# Patient Record
Sex: FEMALE | Race: BLACK OR AFRICAN AMERICAN | NOT HISPANIC OR LATINO | URBAN - METROPOLITAN AREA
[De-identification: names, ages, dates, MRNs, and addresses within clinical notes are randomized per-mention and may not be internally consistent; named-entity substitution may affect disease eponyms.]

---

## 2017-01-17 ENCOUNTER — HOSPITAL ENCOUNTER (OUTPATIENT)
Dept: FAMILY MEDICINE CLINIC | Facility: CLINIC | Age: 52
Setting detail: SPECIMEN
Discharge: HOME | End: 2017-01-17
Attending: FAMILY MEDICINE | Admitting: FAMILY MEDICINE

## 2017-01-17 ENCOUNTER — CONVERSION ENCOUNTER (OUTPATIENT)
Dept: FAMILY MEDICINE CLINIC | Facility: CLINIC | Age: 52
End: 2017-01-17

## 2017-01-17 LAB
ANION GAP SERPL CALC-SCNC: 11.4 MMOL/L (ref 10–20)
BUN SERPL-MCNC: 22 MG/DL (ref 8–20)
BUN/CREAT SERPL: 22 (ref 5.4–26.2)
CALCIUM SERPL-MCNC: 8.8 MG/DL (ref 8.9–10.3)
CHLORIDE SERPL-SCNC: 106 MMOL/L (ref 101–111)
CONV CO2: 26 MMOL/L (ref 22–32)
CREAT UR-MCNC: 1 MG/DL (ref 0.4–1)
GLUCOSE SERPL-MCNC: 135 MG/DL (ref 65–99)
POTASSIUM SERPL-SCNC: 4.4 MMOL/L (ref 3.6–5.1)
SODIUM SERPL-SCNC: 139 MMOL/L (ref 136–144)

## 2017-01-24 ENCOUNTER — HOSPITAL ENCOUNTER (OUTPATIENT)
Dept: MAMMOGRAPHY | Facility: HOSPITAL | Age: 52
Discharge: HOME | End: 2017-01-24
Attending: FAMILY MEDICINE | Admitting: FAMILY MEDICINE

## 2017-09-08 ENCOUNTER — CONVERSION ENCOUNTER (OUTPATIENT)
Dept: FAMILY MEDICINE CLINIC | Facility: CLINIC | Age: 52
End: 2017-09-08

## 2017-09-08 ENCOUNTER — HOSPITAL ENCOUNTER (OUTPATIENT)
Dept: FAMILY MEDICINE CLINIC | Facility: CLINIC | Age: 52
Setting detail: SPECIMEN
Discharge: HOME | End: 2017-09-08
Attending: FAMILY MEDICINE | Admitting: FAMILY MEDICINE

## 2018-04-10 ENCOUNTER — HOSPITAL ENCOUNTER (OUTPATIENT)
Dept: FAMILY MEDICINE CLINIC | Facility: CLINIC | Age: 53
Setting detail: SPECIMEN
Discharge: HOME | End: 2018-04-10
Attending: FAMILY MEDICINE | Admitting: FAMILY MEDICINE

## 2018-04-10 ENCOUNTER — CONVERSION ENCOUNTER (OUTPATIENT)
Dept: FAMILY MEDICINE CLINIC | Facility: CLINIC | Age: 53
End: 2018-04-10

## 2018-04-10 LAB
ALBUMIN SERPL-MCNC: 3.8 G/DL (ref 3.5–4.8)
ALBUMIN/GLOB SERPL: 1.2 {RATIO} (ref 1–1.7)
ALP SERPL-CCNC: 84 IU/L (ref 32–91)
ALT SERPL-CCNC: 19 IU/L (ref 14–54)
ANION GAP SERPL CALC-SCNC: 10.9 MMOL/L (ref 10–20)
AST SERPL-CCNC: 21 IU/L (ref 15–41)
BILIRUB SERPL-MCNC: 0.6 MG/DL (ref 0.3–1.2)
BUN SERPL-MCNC: 18 MG/DL (ref 8–20)
BUN/CREAT SERPL: 20 (ref 5.4–26.2)
CALCIUM SERPL-MCNC: 9 MG/DL (ref 8.9–10.3)
CHLORIDE SERPL-SCNC: 104 MMOL/L (ref 101–111)
CHOLEST SERPL-MCNC: 206 MG/DL
CHOLEST/HDLC SERPL: 2.8 {RATIO}
CONV CO2: 25 MMOL/L (ref 22–32)
CONV LDL CHOLESTEROL DIRECT: 110 MG/DL (ref 0–100)
CONV MICROALBUM.,U,RANDOM: 14 MG/L
CONV TOTAL PROTEIN: 7.1 G/DL (ref 6.1–7.9)
CREAT 24H UR-MCNC: 165.7 MG/DL
CREAT UR-MCNC: 0.9 MG/DL (ref 0.4–1)
GLOBULIN UR ELPH-MCNC: 3.3 G/DL (ref 2.5–3.8)
GLUCOSE SERPL-MCNC: 229 MG/DL (ref 65–99)
HDLC SERPL-MCNC: 74 MG/DL
LDLC/HDLC SERPL: 1.5 {RATIO}
LIPID INTERPRETATION: ABNORMAL
MICROALBUMIN/CREAT UR: 8.4 UG/MG
POTASSIUM SERPL-SCNC: 3.9 MMOL/L (ref 3.6–5.1)
SODIUM SERPL-SCNC: 136 MMOL/L (ref 136–144)
TRIGL SERPL-MCNC: 100 MG/DL
VLDLC SERPL CALC-MCNC: 22.8 MG/DL

## 2018-10-05 ENCOUNTER — CONVERSION ENCOUNTER (OUTPATIENT)
Dept: FAMILY MEDICINE CLINIC | Facility: CLINIC | Age: 53
End: 2018-10-05

## 2018-10-05 ENCOUNTER — HOSPITAL ENCOUNTER (OUTPATIENT)
Dept: FAMILY MEDICINE CLINIC | Facility: CLINIC | Age: 53
Setting detail: SPECIMEN
Discharge: HOME | End: 2018-10-05
Attending: FAMILY MEDICINE | Admitting: FAMILY MEDICINE

## 2018-10-05 LAB
25(OH)D3 SERPL-MCNC: 39 NG/ML (ref 30–100)
ALBUMIN SERPL-MCNC: 4.1 G/DL (ref 3.5–4.8)
ALBUMIN/GLOB SERPL: 1.3 {RATIO} (ref 1–1.7)
ALP SERPL-CCNC: 89 IU/L (ref 32–91)
ALT SERPL-CCNC: 19 IU/L (ref 14–54)
ANION GAP SERPL CALC-SCNC: 13.8 MMOL/L (ref 10–20)
AST SERPL-CCNC: 21 IU/L (ref 15–41)
BASOPHILS # BLD AUTO: 0.1 10*3/UL (ref 0–0.2)
BASOPHILS NFR BLD AUTO: 1 % (ref 0–2)
BILIRUB SERPL-MCNC: 0.8 MG/DL (ref 0.3–1.2)
BILIRUB UR QL STRIP: NEGATIVE MG/DL
BUN SERPL-MCNC: 23 MG/DL (ref 8–20)
BUN/CREAT SERPL: 25.6 (ref 5.4–26.2)
CALCIUM SERPL-MCNC: 9.3 MG/DL (ref 8.9–10.3)
CASTS URNS QL MICRO: ABNORMAL /[LPF]
CHLORIDE SERPL-SCNC: 101 MMOL/L (ref 101–111)
CHOLEST SERPL-MCNC: 186 MG/DL
CHOLEST/HDLC SERPL: 2.5 {RATIO}
COLOR UR: YELLOW
CONV BACTERIA IN URINE MICRO: NEGATIVE
CONV CLARITY OF URINE: CLEAR
CONV CO2: 24 MMOL/L (ref 22–32)
CONV HYALINE CASTS IN URINE MICRO: 0 /[LPF] (ref 0–5)
CONV LDL CHOLESTEROL DIRECT: 97 MG/DL (ref 0–100)
CONV MICROALBUM.,U,RANDOM: 17 MG/L
CONV PROTEIN IN URINE BY AUTOMATED TEST STRIP: NEGATIVE MG/DL
CONV SMALL ROUND CELLS: ABNORMAL /[HPF]
CONV TOTAL PROTEIN: 7.2 G/DL (ref 6.1–7.9)
CONV UROBILINOGEN IN URINE BY AUTOMATED TEST STRIP: 0.2 MG/DL
CREAT 24H UR-MCNC: 93.9 MG/DL
CREAT UR-MCNC: 0.9 MG/DL (ref 0.4–1)
CULTURE INDICATED?: ABNORMAL
DIFFERENTIAL METHOD BLD: (no result)
EOSINOPHIL # BLD AUTO: 0.3 10*3/UL (ref 0–0.3)
EOSINOPHIL # BLD AUTO: 4 % (ref 0–3)
ERYTHROCYTE [DISTWIDTH] IN BLOOD BY AUTOMATED COUNT: 13.7 % (ref 11.5–14.5)
GLOBULIN UR ELPH-MCNC: 3.1 G/DL (ref 2.5–3.8)
GLUCOSE SERPL-MCNC: 346 MG/DL (ref 65–99)
GLUCOSE UR QL: >1000 MG/DL
HBA1C MFR BLD: 12.6 % (ref 0–5.6)
HCT VFR BLD AUTO: 42 % (ref 35–49)
HDLC SERPL-MCNC: 75 MG/DL
HGB BLD-MCNC: 14.1 G/DL (ref 12–15)
HGB UR QL STRIP: ABNORMAL
KETONES UR QL STRIP: NEGATIVE MG/DL
LDLC/HDLC SERPL: 1.3 {RATIO}
LEUKOCYTE ESTERASE UR QL STRIP: NEGATIVE
LIPID INTERPRETATION: NORMAL
LYMPHOCYTES # BLD AUTO: 1.6 10*3/UL (ref 0.8–4.8)
LYMPHOCYTES NFR BLD AUTO: 25 % (ref 18–42)
MCH RBC QN AUTO: 30.7 PG (ref 26–32)
MCHC RBC AUTO-ENTMCNC: 33.7 G/DL (ref 32–36)
MCV RBC AUTO: 91.2 FL (ref 80–94)
MICROALBUMIN/CREAT UR: 18.1 UG/MG
MONOCYTES # BLD AUTO: 0.3 10*3/UL (ref 0.1–1.3)
MONOCYTES NFR BLD AUTO: 5 % (ref 2–11)
NEUTROPHILS # BLD AUTO: 4.1 10*3/UL (ref 2.3–8.6)
NEUTROPHILS NFR BLD AUTO: 65 % (ref 50–75)
NITRITE UR QL STRIP: NEGATIVE
NRBC BLD AUTO-RTO: 0 /100{WBCS}
NRBC/RBC NFR BLD MANUAL: 0 10*3/UL
PH UR STRIP.AUTO: 6 [PH] (ref 4.5–8)
PLATELET # BLD AUTO: 241 10*3/UL (ref 150–450)
PMV BLD AUTO: 10.8 FL (ref 7.4–10.4)
POTASSIUM SERPL-SCNC: 3.8 MMOL/L (ref 3.6–5.1)
RBC # BLD AUTO: 4.6 10*6/UL (ref 4–5.4)
RBC #/AREA URNS HPF: 4 /[HPF] (ref 0–3)
SODIUM SERPL-SCNC: 135 MMOL/L (ref 136–144)
SP GR UR: 1.03 (ref 1–1.03)
SPERM URNS QL MICRO: ABNORMAL /[HPF]
SQUAMOUS SPT QL MICRO: 1 /[HPF] (ref 0–5)
TRIGL SERPL-MCNC: 69 MG/DL
TSH SERPL-ACNC: 1.78 UIU/ML (ref 0.34–5.6)
UNIDENT CRYS URNS QL MICRO: ABNORMAL /[HPF]
VLDLC SERPL CALC-MCNC: 14.7 MG/DL
WBC # BLD AUTO: 6.4 10*3/UL (ref 4.5–11.5)
WBC #/AREA URNS HPF: 2 /[HPF] (ref 0–5)
YEAST SPEC QL WET PREP: ABNORMAL /[HPF]

## 2018-10-11 LAB
HPV E6+E7 MRNA CVX QL NAA+PROBE: NOT DETECTED
THIN PREP CVX: NORMAL

## 2019-06-04 VITALS
RESPIRATION RATE: 16 BRPM | SYSTOLIC BLOOD PRESSURE: 150 MMHG | DIASTOLIC BLOOD PRESSURE: 87 MMHG | OXYGEN SATURATION: 99 % | OXYGEN SATURATION: 97 % | WEIGHT: 220 LBS | WEIGHT: 214 LBS | HEART RATE: 70 BPM | DIASTOLIC BLOOD PRESSURE: 90 MMHG | DIASTOLIC BLOOD PRESSURE: 92 MMHG | OXYGEN SATURATION: 98 % | WEIGHT: 225 LBS | HEART RATE: 93 BPM | SYSTOLIC BLOOD PRESSURE: 137 MMHG | SYSTOLIC BLOOD PRESSURE: 197 MMHG | HEART RATE: 75 BPM | WEIGHT: 229 LBS | OXYGEN SATURATION: 99 % | RESPIRATION RATE: 16 BRPM | RESPIRATION RATE: 16 BRPM | HEART RATE: 67 BPM | RESPIRATION RATE: 16 BRPM | DIASTOLIC BLOOD PRESSURE: 97 MMHG | SYSTOLIC BLOOD PRESSURE: 115 MMHG

## 2023-10-24 ENCOUNTER — HOSPITAL ENCOUNTER (INPATIENT)
Facility: HOSPITAL | Age: 58
LOS: 4 days | Discharge: NURSING, REHAB OR LONG-TERM CARE | DRG: 481 | End: 2023-10-28
Attending: EMERGENCY MEDICINE | Admitting: STUDENT IN AN ORGANIZED HEALTH CARE EDUCATION/TRAINING PROGRAM
Payer: COMMERCIAL

## 2023-10-24 ENCOUNTER — APPOINTMENT (OUTPATIENT)
Dept: GENERAL RADIOLOGY | Facility: HOSPITAL | Age: 58
DRG: 481 | End: 2023-10-24
Payer: COMMERCIAL

## 2023-10-24 ENCOUNTER — APPOINTMENT (OUTPATIENT)
Dept: CT IMAGING | Facility: HOSPITAL | Age: 58
DRG: 481 | End: 2023-10-24
Payer: COMMERCIAL

## 2023-10-24 DIAGNOSIS — S72.352A CLOSED DISPLACED COMMINUTED FRACTURE OF SHAFT OF LEFT FEMUR, INITIAL ENCOUNTER: Primary | ICD-10-CM

## 2023-10-24 PROBLEM — E66.9 OBESITY (BMI 30-39.9): Status: ACTIVE | Noted: 2023-10-24

## 2023-10-24 PROBLEM — E78.5 HLD (HYPERLIPIDEMIA): Status: ACTIVE | Noted: 2023-10-24

## 2023-10-24 PROBLEM — I10 HTN (HYPERTENSION): Status: ACTIVE | Noted: 2023-10-24

## 2023-10-24 PROBLEM — E11.9 TYPE 2 DIABETES MELLITUS, WITHOUT LONG-TERM CURRENT USE OF INSULIN: Status: ACTIVE | Noted: 2023-10-24

## 2023-10-24 LAB
ALBUMIN SERPL-MCNC: 4.1 G/DL (ref 3.5–5.2)
ALBUMIN/GLOB SERPL: 1.4 G/DL
ALP SERPL-CCNC: 97 U/L (ref 39–117)
ALT SERPL W P-5'-P-CCNC: 17 U/L (ref 1–33)
ANION GAP SERPL CALCULATED.3IONS-SCNC: 10 MMOL/L (ref 5–15)
AST SERPL-CCNC: 21 U/L (ref 1–32)
BASOPHILS # BLD AUTO: 0.03 10*3/MM3 (ref 0–0.2)
BASOPHILS NFR BLD AUTO: 0.4 % (ref 0–1.5)
BILIRUB SERPL-MCNC: 0.5 MG/DL (ref 0–1.2)
BUN SERPL-MCNC: 24 MG/DL (ref 6–20)
BUN/CREAT SERPL: 24 (ref 7–25)
CALCIUM SPEC-SCNC: 9.6 MG/DL (ref 8.6–10.5)
CHLORIDE SERPL-SCNC: 105 MMOL/L (ref 98–107)
CO2 SERPL-SCNC: 24 MMOL/L (ref 22–29)
CREAT SERPL-MCNC: 1 MG/DL (ref 0.57–1)
DEPRECATED RDW RBC AUTO: 45.5 FL (ref 37–54)
EGFRCR SERPLBLD CKD-EPI 2021: 65.4 ML/MIN/1.73
EOSINOPHIL # BLD AUTO: 0.3 10*3/MM3 (ref 0–0.4)
EOSINOPHIL NFR BLD AUTO: 4.3 % (ref 0.3–6.2)
ERYTHROCYTE [DISTWIDTH] IN BLOOD BY AUTOMATED COUNT: 13.1 % (ref 12.3–15.4)
GLOBULIN UR ELPH-MCNC: 2.9 GM/DL
GLUCOSE BLDC GLUCOMTR-MCNC: 146 MG/DL (ref 70–130)
GLUCOSE BLDC GLUCOMTR-MCNC: 173 MG/DL (ref 70–130)
GLUCOSE BLDC GLUCOMTR-MCNC: 196 MG/DL (ref 70–130)
GLUCOSE BLDC GLUCOMTR-MCNC: 202 MG/DL (ref 70–130)
GLUCOSE SERPL-MCNC: 208 MG/DL (ref 65–99)
HBA1C MFR BLD: 9 % (ref 4.8–5.6)
HCT VFR BLD AUTO: 41 % (ref 34–46.6)
HGB BLD-MCNC: 13.4 G/DL (ref 12–15.9)
IMM GRANULOCYTES # BLD AUTO: 0.03 10*3/MM3 (ref 0–0.05)
IMM GRANULOCYTES NFR BLD AUTO: 0.4 % (ref 0–0.5)
INR PPP: 1 (ref 0.9–1.1)
LYMPHOCYTES # BLD AUTO: 1.49 10*3/MM3 (ref 0.7–3.1)
LYMPHOCYTES NFR BLD AUTO: 21.4 % (ref 19.6–45.3)
MCH RBC QN AUTO: 30.6 PG (ref 26.6–33)
MCHC RBC AUTO-ENTMCNC: 32.7 G/DL (ref 31.5–35.7)
MCV RBC AUTO: 93.6 FL (ref 79–97)
MONOCYTES # BLD AUTO: 0.41 10*3/MM3 (ref 0.1–0.9)
MONOCYTES NFR BLD AUTO: 5.9 % (ref 5–12)
NEUTROPHILS NFR BLD AUTO: 4.71 10*3/MM3 (ref 1.7–7)
NEUTROPHILS NFR BLD AUTO: 67.6 % (ref 42.7–76)
NRBC BLD AUTO-RTO: 0 /100 WBC (ref 0–0.2)
PLATELET # BLD AUTO: 232 10*3/MM3 (ref 140–450)
PMV BLD AUTO: 11.5 FL (ref 6–12)
POTASSIUM SERPL-SCNC: 4.4 MMOL/L (ref 3.5–5.2)
PROT SERPL-MCNC: 7 G/DL (ref 6–8.5)
PROTHROMBIN TIME: 13.3 SECONDS (ref 11.7–14.2)
QT INTERVAL: 392 MS
QTC INTERVAL: 441 MS
RBC # BLD AUTO: 4.38 10*6/MM3 (ref 3.77–5.28)
SODIUM SERPL-SCNC: 139 MMOL/L (ref 136–145)
WBC NRBC COR # BLD: 6.97 10*3/MM3 (ref 3.4–10.8)

## 2023-10-24 PROCEDURE — 25010000002 MORPHINE PER 10 MG: Performed by: EMERGENCY MEDICINE

## 2023-10-24 PROCEDURE — 80053 COMPREHEN METABOLIC PANEL: CPT | Performed by: EMERGENCY MEDICINE

## 2023-10-24 PROCEDURE — 85025 COMPLETE CBC W/AUTO DIFF WBC: CPT | Performed by: EMERGENCY MEDICINE

## 2023-10-24 PROCEDURE — 99285 EMERGENCY DEPT VISIT HI MDM: CPT

## 2023-10-24 PROCEDURE — 73502 X-RAY EXAM HIP UNI 2-3 VIEWS: CPT

## 2023-10-24 PROCEDURE — 93010 ELECTROCARDIOGRAM REPORT: CPT | Performed by: INTERNAL MEDICINE

## 2023-10-24 PROCEDURE — 25010000002 HYDROMORPHONE PER 4 MG: Performed by: ORTHOPAEDIC SURGERY

## 2023-10-24 PROCEDURE — 83036 HEMOGLOBIN GLYCOSYLATED A1C: CPT | Performed by: STUDENT IN AN ORGANIZED HEALTH CARE EDUCATION/TRAINING PROGRAM

## 2023-10-24 PROCEDURE — 71045 X-RAY EXAM CHEST 1 VIEW: CPT

## 2023-10-24 PROCEDURE — 73700 CT LOWER EXTREMITY W/O DYE: CPT

## 2023-10-24 PROCEDURE — 63710000001 INSULIN LISPRO (HUMAN) PER 5 UNITS: Performed by: STUDENT IN AN ORGANIZED HEALTH CARE EDUCATION/TRAINING PROGRAM

## 2023-10-24 PROCEDURE — 93005 ELECTROCARDIOGRAM TRACING: CPT | Performed by: EMERGENCY MEDICINE

## 2023-10-24 PROCEDURE — 73552 X-RAY EXAM OF FEMUR 2/>: CPT

## 2023-10-24 PROCEDURE — 25010000002 HYDROMORPHONE PER 4 MG: Performed by: EMERGENCY MEDICINE

## 2023-10-24 PROCEDURE — 85610 PROTHROMBIN TIME: CPT | Performed by: STUDENT IN AN ORGANIZED HEALTH CARE EDUCATION/TRAINING PROGRAM

## 2023-10-24 PROCEDURE — 82948 REAGENT STRIP/BLOOD GLUCOSE: CPT

## 2023-10-24 PROCEDURE — 25010000002 LABETALOL 5 MG/ML SOLUTION: Performed by: STUDENT IN AN ORGANIZED HEALTH CARE EDUCATION/TRAINING PROGRAM

## 2023-10-24 RX ORDER — DEXTROSE MONOHYDRATE 25 G/50ML
25 INJECTION, SOLUTION INTRAVENOUS
Status: DISCONTINUED | OUTPATIENT
Start: 2023-10-24 | End: 2023-10-28 | Stop reason: HOSPADM

## 2023-10-24 RX ORDER — MORPHINE SULFATE 2 MG/ML
2 INJECTION, SOLUTION INTRAMUSCULAR; INTRAVENOUS
Status: DISCONTINUED | OUTPATIENT
Start: 2023-10-24 | End: 2023-10-25

## 2023-10-24 RX ORDER — HYDROMORPHONE HYDROCHLORIDE 1 MG/ML
0.5 INJECTION, SOLUTION INTRAMUSCULAR; INTRAVENOUS; SUBCUTANEOUS ONCE
Status: COMPLETED | OUTPATIENT
Start: 2023-10-24 | End: 2023-10-24

## 2023-10-24 RX ORDER — ACETAMINOPHEN 325 MG/1
650 TABLET ORAL EVERY 4 HOURS PRN
Status: DISCONTINUED | OUTPATIENT
Start: 2023-10-24 | End: 2023-10-28 | Stop reason: HOSPADM

## 2023-10-24 RX ORDER — ROSUVASTATIN CALCIUM 40 MG/1
40 TABLET, COATED ORAL DAILY
Status: DISCONTINUED | OUTPATIENT
Start: 2023-10-24 | End: 2023-10-28 | Stop reason: HOSPADM

## 2023-10-24 RX ORDER — HYDROMORPHONE HYDROCHLORIDE 1 MG/ML
0.5 INJECTION, SOLUTION INTRAMUSCULAR; INTRAVENOUS; SUBCUTANEOUS
Status: DISCONTINUED | OUTPATIENT
Start: 2023-10-24 | End: 2023-10-25 | Stop reason: SDUPTHER

## 2023-10-24 RX ORDER — AMOXICILLIN 250 MG
2 CAPSULE ORAL 2 TIMES DAILY
Status: DISCONTINUED | OUTPATIENT
Start: 2023-10-24 | End: 2023-10-28 | Stop reason: HOSPADM

## 2023-10-24 RX ORDER — NALOXONE HCL 0.4 MG/ML
0.4 VIAL (ML) INJECTION
Status: DISCONTINUED | OUTPATIENT
Start: 2023-10-24 | End: 2023-10-25

## 2023-10-24 RX ORDER — EMPAGLIFLOZIN 25 MG/1
25 TABLET, FILM COATED ORAL DAILY
COMMUNITY
Start: 2023-10-06

## 2023-10-24 RX ORDER — ONDANSETRON 2 MG/ML
4 INJECTION INTRAMUSCULAR; INTRAVENOUS EVERY 6 HOURS PRN
Status: DISCONTINUED | OUTPATIENT
Start: 2023-10-24 | End: 2023-10-25

## 2023-10-24 RX ORDER — LABETALOL HYDROCHLORIDE 5 MG/ML
10 INJECTION, SOLUTION INTRAVENOUS EVERY 6 HOURS PRN
Status: DISCONTINUED | OUTPATIENT
Start: 2023-10-24 | End: 2023-10-28 | Stop reason: HOSPADM

## 2023-10-24 RX ORDER — SODIUM CHLORIDE 0.9 % (FLUSH) 0.9 %
10 SYRINGE (ML) INJECTION AS NEEDED
Status: DISCONTINUED | OUTPATIENT
Start: 2023-10-24 | End: 2023-10-25

## 2023-10-24 RX ORDER — HYDROCODONE BITARTRATE AND ACETAMINOPHEN 10; 325 MG/1; MG/1
2 TABLET ORAL EVERY 4 HOURS PRN
Status: DISCONTINUED | OUTPATIENT
Start: 2023-10-24 | End: 2023-10-25 | Stop reason: SDUPTHER

## 2023-10-24 RX ORDER — SODIUM CHLORIDE 9 MG/ML
40 INJECTION, SOLUTION INTRAVENOUS AS NEEDED
Status: DISCONTINUED | OUTPATIENT
Start: 2023-10-24 | End: 2023-10-28 | Stop reason: HOSPADM

## 2023-10-24 RX ORDER — HYDROCODONE BITARTRATE AND ACETAMINOPHEN 10; 325 MG/1; MG/1
1 TABLET ORAL EVERY 4 HOURS PRN
Status: DISCONTINUED | OUTPATIENT
Start: 2023-10-24 | End: 2023-10-25 | Stop reason: SDUPTHER

## 2023-10-24 RX ORDER — CHOLECALCIFEROL (VITAMIN D3) 125 MCG
5 CAPSULE ORAL NIGHTLY PRN
Status: DISCONTINUED | OUTPATIENT
Start: 2023-10-24 | End: 2023-10-28 | Stop reason: HOSPADM

## 2023-10-24 RX ORDER — BISACODYL 10 MG
10 SUPPOSITORY, RECTAL RECTAL DAILY PRN
Status: DISCONTINUED | OUTPATIENT
Start: 2023-10-24 | End: 2023-10-28 | Stop reason: HOSPADM

## 2023-10-24 RX ORDER — ONDANSETRON 2 MG/ML
8 INJECTION INTRAMUSCULAR; INTRAVENOUS ONCE AS NEEDED
Status: DISCONTINUED | OUTPATIENT
Start: 2023-10-24 | End: 2023-10-25 | Stop reason: SDUPTHER

## 2023-10-24 RX ORDER — ROSUVASTATIN CALCIUM 40 MG/1
40 TABLET, COATED ORAL DAILY
COMMUNITY
Start: 2023-10-06

## 2023-10-24 RX ORDER — INSULIN LISPRO 100 [IU]/ML
2-7 INJECTION, SOLUTION INTRAVENOUS; SUBCUTANEOUS
Status: DISCONTINUED | OUTPATIENT
Start: 2023-10-24 | End: 2023-10-28 | Stop reason: HOSPADM

## 2023-10-24 RX ORDER — CEFAZOLIN SODIUM 2 G/100ML
2000 INJECTION, SOLUTION INTRAVENOUS ONCE
Status: COMPLETED | OUTPATIENT
Start: 2023-10-24 | End: 2023-10-25

## 2023-10-24 RX ORDER — MORPHINE SULFATE 2 MG/ML
4 INJECTION, SOLUTION INTRAMUSCULAR; INTRAVENOUS ONCE
Status: COMPLETED | OUTPATIENT
Start: 2023-10-24 | End: 2023-10-24

## 2023-10-24 RX ORDER — POLYETHYLENE GLYCOL 3350 17 G/17G
17 POWDER, FOR SOLUTION ORAL DAILY PRN
Status: DISCONTINUED | OUTPATIENT
Start: 2023-10-24 | End: 2023-10-28 | Stop reason: HOSPADM

## 2023-10-24 RX ORDER — IBUPROFEN 600 MG/1
1 TABLET ORAL
Status: DISCONTINUED | OUTPATIENT
Start: 2023-10-24 | End: 2023-10-28 | Stop reason: HOSPADM

## 2023-10-24 RX ORDER — AMLODIPINE BESYLATE 10 MG/1
10 TABLET ORAL
Status: DISCONTINUED | OUTPATIENT
Start: 2023-10-25 | End: 2023-10-26

## 2023-10-24 RX ORDER — ACETAMINOPHEN 650 MG/1
650 SUPPOSITORY RECTAL EVERY 4 HOURS PRN
Status: DISCONTINUED | OUTPATIENT
Start: 2023-10-24 | End: 2023-10-28 | Stop reason: HOSPADM

## 2023-10-24 RX ORDER — AMLODIPINE BESYLATE 10 MG/1
10 TABLET ORAL DAILY
COMMUNITY
Start: 2023-10-06

## 2023-10-24 RX ORDER — ACETAMINOPHEN 160 MG/5ML
650 SOLUTION ORAL EVERY 4 HOURS PRN
Status: DISCONTINUED | OUTPATIENT
Start: 2023-10-24 | End: 2023-10-28 | Stop reason: HOSPADM

## 2023-10-24 RX ORDER — ONDANSETRON 4 MG/1
4 TABLET, FILM COATED ORAL EVERY 6 HOURS PRN
Status: DISCONTINUED | OUTPATIENT
Start: 2023-10-24 | End: 2023-10-25

## 2023-10-24 RX ORDER — MORPHINE SULFATE 2 MG/ML
4 INJECTION, SOLUTION INTRAMUSCULAR; INTRAVENOUS EVERY 4 HOURS PRN
Status: DISCONTINUED | OUTPATIENT
Start: 2023-10-24 | End: 2023-10-25

## 2023-10-24 RX ORDER — SODIUM CHLORIDE 0.9 % (FLUSH) 0.9 %
10 SYRINGE (ML) INJECTION EVERY 12 HOURS SCHEDULED
Status: DISCONTINUED | OUTPATIENT
Start: 2023-10-24 | End: 2023-10-25

## 2023-10-24 RX ORDER — HYDROCODONE BITARTRATE AND ACETAMINOPHEN 5; 325 MG/1; MG/1
1 TABLET ORAL EVERY 4 HOURS PRN
Status: DISCONTINUED | OUTPATIENT
Start: 2023-10-24 | End: 2023-10-25

## 2023-10-24 RX ORDER — BISACODYL 5 MG/1
5 TABLET, DELAYED RELEASE ORAL DAILY PRN
Status: DISCONTINUED | OUTPATIENT
Start: 2023-10-24 | End: 2023-10-28 | Stop reason: HOSPADM

## 2023-10-24 RX ORDER — NICOTINE POLACRILEX 4 MG
15 LOZENGE BUCCAL
Status: DISCONTINUED | OUTPATIENT
Start: 2023-10-24 | End: 2023-10-28 | Stop reason: HOSPADM

## 2023-10-24 RX ADMIN — Medication 10 ML: at 21:36

## 2023-10-24 RX ADMIN — DOCUSATE SODIUM 50MG AND SENNOSIDES 8.6MG 2 TABLET: 8.6; 5 TABLET, FILM COATED ORAL at 10:01

## 2023-10-24 RX ADMIN — LABETALOL HYDROCHLORIDE 10 MG: 5 INJECTION, SOLUTION INTRAVENOUS at 08:51

## 2023-10-24 RX ADMIN — ROSUVASTATIN CALCIUM 40 MG: 40 TABLET, FILM COATED ORAL at 21:36

## 2023-10-24 RX ADMIN — HYDROMORPHONE HYDROCHLORIDE 0.5 MG: 1 INJECTION, SOLUTION INTRAMUSCULAR; INTRAVENOUS; SUBCUTANEOUS at 09:57

## 2023-10-24 RX ADMIN — HYDROMORPHONE HYDROCHLORIDE 0.5 MG: 1 INJECTION, SOLUTION INTRAMUSCULAR; INTRAVENOUS; SUBCUTANEOUS at 19:38

## 2023-10-24 RX ADMIN — HYDROMORPHONE HYDROCHLORIDE 0.5 MG: 1 INJECTION, SOLUTION INTRAMUSCULAR; INTRAVENOUS; SUBCUTANEOUS at 07:24

## 2023-10-24 RX ADMIN — Medication 10 ML: at 09:57

## 2023-10-24 RX ADMIN — INSULIN LISPRO 2 UNITS: 100 INJECTION, SOLUTION INTRAVENOUS; SUBCUTANEOUS at 12:30

## 2023-10-24 RX ADMIN — DOCUSATE SODIUM 50MG AND SENNOSIDES 8.6MG 2 TABLET: 8.6; 5 TABLET, FILM COATED ORAL at 21:13

## 2023-10-24 RX ADMIN — HYDROMORPHONE HYDROCHLORIDE 0.5 MG: 1 INJECTION, SOLUTION INTRAMUSCULAR; INTRAVENOUS; SUBCUTANEOUS at 13:53

## 2023-10-24 RX ADMIN — INSULIN LISPRO 3 UNITS: 100 INJECTION, SOLUTION INTRAVENOUS; SUBCUTANEOUS at 17:22

## 2023-10-24 RX ADMIN — MORPHINE SULFATE 4 MG: 2 INJECTION, SOLUTION INTRAMUSCULAR; INTRAVENOUS at 06:24

## 2023-10-24 RX ADMIN — HYDROCODONE BITARTRATE AND ACETAMINOPHEN 2 TABLET: 10; 325 TABLET ORAL at 17:28

## 2023-10-24 RX ADMIN — HYDROCODONE BITARTRATE AND ACETAMINOPHEN 2 TABLET: 10; 325 TABLET ORAL at 21:36

## 2023-10-24 NOTE — PAYOR COMM NOTE
"Adair Rea (58 y.o. Female)          INPATIENT REQUEST FOR [id number]  PATIENT IS SCHEDULED FOR SURGERY 10/25/23.  I WILL FAX OP NOTE TOMORROW POST PROCEDURE.      CONTACT MELODY MARY  P# [phone]  F# [phone]     Date of Birth   1965    Social Security Number       Address   [address] [place] [address]    Home Phone   [phone]    MRN   [id number]       Church   [facility]    Marital Status   Single                            Admission Date   10/24/23    Admission Type   Emergency    Admitting Provider   Mu Esteves MD    Attending Provider   Mu Esteves MD    Department, Room/Bed   [address], 81/1       Discharge Date       Discharge Disposition       Discharge Destination                                 Attending Provider: Mu Esteves MD    Allergies: No Known Allergies    Isolation: None   Infection: None   Code Status: CPR    Ht: 162.6 cm (64\")   Wt: 85.3 kg (188 lb)    Admission Cmt: None   Principal Problem: Closed displaced comminuted fracture of shaft of left femur [S72.352A]                   Active Insurance as of 10/24/2023       Primary Coverage       Payor Plan Insurance Group Employer/Plan Group    [employer]HEM BLUE CROSS ANTHEM BLUE CROSS BLUE SHIELD PPO [id number]       Payor Plan Address Payor Plan Phone Number Payor Plan Fax Number Effective Dates    [address] [phone]  1/1/2023 - None Entered    [facility] [id number]         Subscriber Name Subscriber Birth Date Member ID       ADAIR REA 1965 [id number]               Secondary Coverage       Payor Plan Insurance Group Employer/Plan Group    [place] [id number]       Payor Plan Address Payor Plan Phone Number Payor Plan Fax Number Effective Dates    [employer] [address] [address] [phone]  1/1/2021 - None Entered    [facility] [id number]         Subscriber Name Subscriber Birth Date Member ID       ADAIR REA 1965 " [id number]                     Emergency Contacts        (Rel.) Home Phone Work Phone Mobile Phone    Edis Najera (Brother) -- -- [phone]                 History & Physical        Mu Esteves MD at 10/24/23 1034              Patient Name:  Sasha Knapp  YOB: 1965  MRN:  [id number]  Admit Date:  10/24/2023  Patient Care Team:  Provider, No Known as PCP - General      Subjective  History Present Illness     Chief Complaint   Patient presents with    Leg Injury         History of Present Illness  Ms. Knapp is a 58 y.o. with possible history of hypertension, hyperlipidemia, diabetes presenting from home after mechanical fall down half a flight of stairs.  And immediately felt pain in her left lower knee.  States she lost her balance and fell.  Denies any dizziness, palpitations, chest pain, dyspnea.  Does not take any blood thinners, did not hit head, did not lose consciousness.  Patient states that she is already taken her amlodipine this morning around 4:30 AM.    Review of Systems   Constitutional:  Negative for chills and fever.   Respiratory:  Negative for cough and shortness of breath.    Cardiovascular:  Negative for chest pain and leg swelling.   Gastrointestinal:  Negative for abdominal pain, diarrhea and nausea.   Genitourinary:  Negative for dysuria and frequency.   Musculoskeletal:         Left knee pain   Neurological:  Negative for dizziness, seizures and weakness.        Personal History     Past Medical History:   Diagnosis Date    Diabetes mellitus     Hyperlipidemia     Hypertension     Sleep apnea      Past Surgical History:   Procedure Laterality Date    CARPAL TUNNEL RELEASE Left     HYSTERECTOMY  2000    REPLACEMENT TOTAL KNEE BILATERAL Bilateral      History reviewed. No pertinent family history.  Social History     Tobacco Use    Smoking status: Never    Smokeless tobacco: Never   Substance Use Topics    Alcohol use: Not Currently    Drug use: Never      No current facility-administered medications on file prior to encounter.     Current Outpatient Medications on File Prior to Encounter   Medication Sig Dispense Refill    amLODIPine (NORVASC) 10 MG tablet Take 1 tablet by mouth Daily.      Jardiance 25 MG tablet tablet Take 1 tablet by mouth Daily.      metFORMIN (GLUCOPHAGE) 500 MG tablet Take 1 tablet by mouth 2 (Two) Times a Day With Meals.      rosuvastatin (CRESTOR) 40 MG tablet Take 1 tablet by mouth Daily.       No Known Allergies    Objective   Objective     Vital Signs  Temp:  [97.6 °F (36.4 °C)-97.7 °F (36.5 °C)] 97.7 °F (36.5 °C)  Heart Rate:  [69-87] 69  Resp:  [22-24] 22  BP: (135-215)/() 135/66  SpO2:  [98 %-100 %] 99 %  on   ;   Device (Oxygen Therapy): room air  Body mass index is 32.27 kg/m².    Physical Exam  Constitutional:       General: She is not in acute distress.     Appearance: She is not ill-appearing.      Comments: Obese   Cardiovascular:      Rate and Rhythm: Normal rate and regular rhythm.   Pulmonary:      Effort: Pulmonary effort is normal. No respiratory distress.   Abdominal:      General: Abdomen is flat. There is no distension.      Tenderness: There is no abdominal tenderness.   Musculoskeletal:         General: No swelling or deformity. Normal range of motion.      Comments: Left knee tender.  Left extremity sensation and pulses intact.   Skin:     General: Skin is warm and dry.   Neurological:      General: No focal deficit present.      Mental Status: She is alert. Mental status is at baseline.         Results Review:  I reviewed the patient's new clinical results.  I reviewed the patient's new imaging results and agree with the interpretation.  I reviewed the patient's other test results and agree with the interpretation  I personally viewed and interpreted the patient's EKG/Telemetry data  Discussed with ED provider.    Lab Results (last 24 hours)       Procedure Component Value Units Date/Time    CBC &  Differential [[id number]]  (Normal) Collected: 10/24/23 0626    Specimen: Blood Updated: 10/24/23 0635    Narrative:      The following orders were created for panel order CBC & Differential.  Procedure                               Abnormality         Status                     ---------                               -----------         ------                     CBC Auto Differential[[id number]]        Normal              Final result                 Please view results for these tests on the individual orders.    CBC Auto Differential [[id number]]  (Normal) Collected: 10/24/23 0626    Specimen: Blood Updated: 10/24/23 0635     WBC 6.97 10*3/mm3      RBC 4.38 10*6/mm3      Hemoglobin 13.4 g/dL      Hematocrit 41.0 %      MCV 93.6 fL      MCH 30.6 pg      MCHC 32.7 g/dL      RDW 13.1 %      RDW-SD 45.5 fl      MPV 11.5 fL      Platelets 232 10*3/mm3      Neutrophil % 67.6 %      Lymphocyte % 21.4 %      Monocyte % 5.9 %      Eosinophil % 4.3 %      Basophil % 0.4 %      Immature Grans % 0.4 %      Neutrophils, Absolute 4.71 10*3/mm3      Lymphocytes, Absolute 1.49 10*3/mm3      Monocytes, Absolute 0.41 10*3/mm3      Eosinophils, Absolute 0.30 10*3/mm3      Basophils, Absolute 0.03 10*3/mm3      Immature Grans, Absolute 0.03 10*3/mm3      nRBC 0.0 /100 WBC     Hemoglobin A1c [[id number]]  (Abnormal) Collected: 10/24/23 0626    Specimen: Blood Updated: 10/24/23 0833     Hemoglobin A1C 9.00 %     Narrative:      Hemoglobin A1C Ranges:    Increased Risk for Diabetes  5.7% to 6.4%  Diabetes                     >= 6.5%  Diabetic Goal                < 7.0%    Comprehensive Metabolic Panel [[id number]]  (Abnormal) Collected: 10/24/23 0756    Specimen: Blood Updated: 10/24/23 0829     Glucose 208 mg/dL      BUN 24 mg/dL      Creatinine 1.00 mg/dL      Sodium 139 mmol/L      Potassium 4.4 mmol/L      Chloride 105 mmol/L      CO2 24.0 mmol/L      Calcium 9.6 mg/dL      Total Protein 7.0 g/dL      Albumin 4.1 g/dL      ALT (SGPT)  17 U/L      AST (SGOT) 21 U/L      Alkaline Phosphatase 97 U/L      Total Bilirubin 0.5 mg/dL      Globulin 2.9 gm/dL      A/G Ratio 1.4 g/dL      BUN/Creatinine Ratio 24.0     Anion Gap 10.0 mmol/L      eGFR 65.4 mL/min/1.73     Narrative:      GFR Normal >60  Chronic Kidney Disease <60  Kidney Failure <15      Protime-INR [[id number]]  (Normal) Collected: 10/24/23 0856    Specimen: Blood Updated: 10/24/23 0924     Protime 13.3 Seconds      INR 1.00    POC Glucose Once [[id number]]  (Abnormal) Collected: 10/24/23 0856    Specimen: Blood Updated: 10/24/23 0858     Glucose 196 mg/dL     POC Glucose Once [[id number]]  (Abnormal) Collected: 10/24/23 1004    Specimen: Blood Updated: 10/24/23 1006     Glucose 173 mg/dL             Imaging Results (Last 24 Hours)       Procedure Component Value Units Date/Time    CT Lower Extremity Left Without Contrast [[id number]] Collected: 10/24/23 1006     Updated: 10/24/23 1006    Narrative:      CT LEFT FEMUR WITHOUT CONTRAST     HISTORY: Evaluate periprosthetic distal femur fracture.     TECHNIQUE: Axial noncontrast left femur CT from proximal to the mid  femoral shaft level to below the knee is provided and correlated with  femur and hip x-rays from earlier this morning. Radiation dose reduction  techniques were utilized, including automated exposure control and  exposure modulation based on body size.     FINDINGS: There is a total knee arthroplasty hardware and a comminuted,  displaced distal femoral diametaphysis fracture with posterior  displacement of the distal femoral fragment a full bone width. The  distal portion of the fracture extends to the upper edge of the femoral  component anteriorly. The fracture planes do not appear to extend beyond  that level. The patella, proximal tibia and fibula are intact. There is  no evidence of underlying bone lesion.     The significant posterior displacement of the knee relative to the  proximal femur fracture fragment significantly  deforms to the quadriceps  tendon bundle but that structure appears to remain intact.       Impression:      Comminuted distal left femoral diametaphysis fracture  extends to the level of the femoral component anteriorly along the  midline. The remainder of the fracture appears to terminate at the level  of the metaphysis.       XR Chest 1 View [[id number]] Collected: 10/24/23 0659     Updated: 10/24/23 0704    Narrative:      XR CHEST 1 VW-, XR FEMUR 2 VW LEFT-, XR HIP W OR WO PELVIS 2-3 VIEW  LEFT-     HISTORY: Female who is 58 years-old, trauma     TECHNIQUE: Frontal view of the chest, 3 views of the left femur, frontal  view of the pelvis, 2 views of the left hip     COMPARISON: None available     FINDINGS:     Chest x-ray: Heart, mediastinum and pulmonary vasculature are  unremarkable. No focal pulmonary consolidation, pleural effusion, or  pneumothorax. No acute osseous process.     Pelvis, left hip, left femur: A comminuted, angulated, displaced  fracture involving the distal left femoral metaphysis is noted, just  above the left knee arthroplasty hardware. The main distal fracture  fragment is posteriorly displaced by about 1 shaft width. No other  fractures are identified. No dislocation. Hip joint spaces appear  preserved.       Impression:      As described.     This report was finalized on 10/24/2023 7:01 AM by Dr. Irwin Skinner M.D on Workstation: [id number]       XR Hip With or Without Pelvis 2 - 3 View Left [[id number]] Collected: 10/24/23 0659     Updated: 10/24/23 0704    Narrative:      XR CHEST 1 VW-, XR FEMUR 2 VW LEFT-, XR HIP W OR WO PELVIS 2-3 VIEW  LEFT-     HISTORY: Female who is 58 years-old, trauma     TECHNIQUE: Frontal view of the chest, 3 views of the left femur, frontal  view of the pelvis, 2 views of the left hip     COMPARISON: None available     FINDINGS:     Chest x-ray: Heart, mediastinum and pulmonary vasculature are  unremarkable. No focal pulmonary consolidation, pleural  effusion, or  pneumothorax. No acute osseous process.     Pelvis, left hip, left femur: A comminuted, angulated, displaced  fracture involving the distal left femoral metaphysis is noted, just  above the left knee arthroplasty hardware. The main distal fracture  fragment is posteriorly displaced by about 1 shaft width. No other  fractures are identified. No dislocation. Hip joint spaces appear  preserved.       Impression:      As described.     This report was finalized on 10/24/2023 7:01 AM by Dr. Irwin Skinner M.D on Workstation: [id number]       XR Femur 2 View Left [[id number]] Collected: 10/24/23 0659     Updated: 10/24/23 0704    Narrative:      XR CHEST 1 VW-, XR FEMUR 2 VW LEFT-, XR HIP W OR WO PELVIS 2-3 VIEW  LEFT-     HISTORY: Female who is 58 years-old, trauma     TECHNIQUE: Frontal view of the chest, 3 views of the left femur, frontal  view of the pelvis, 2 views of the left hip     COMPARISON: None available     FINDINGS:     Chest x-ray: Heart, mediastinum and pulmonary vasculature are  unremarkable. No focal pulmonary consolidation, pleural effusion, or  pneumothorax. No acute osseous process.     Pelvis, left hip, left femur: A comminuted, angulated, displaced  fracture involving the distal left femoral metaphysis is noted, just  above the left knee arthroplasty hardware. The main distal fracture  fragment is posteriorly displaced by about 1 shaft width. No other  fractures are identified. No dislocation. Hip joint spaces appear  preserved.       Impression:      As described.     This report was finalized on 10/24/2023 7:01 AM by Dr. Irwin Skinner M.D on Workstation: [id number]                   ECG 12 Lead Other; fall   Final Result   HEART RATE= 76  bpm   RR Interval= 789  ms   PA Interval= 187  ms   P Horizontal Axis= 15  deg   P Front Axis= 49  deg   QRSD Interval= 95  ms   QT Interval= 392  ms   QTcB= 441  ms   QRS Axis= 17  deg   T Wave Axis= 9  deg   - NORMAL ECG -   Sinus rhythm   No  Prior Tracing for Comparison   Electronically Signed By: [facility]) 24-Oct-2023 10:21:47   Date and Time of Study: 2023-10-24 06:14:21           Assessment/Plan     Active Hospital Problems    Diagnosis  POA    **Closed displaced comminuted fracture of shaft of left femur [S72.352A]  Yes    HTN (hypertension) [I10]  Yes    Type 2 diabetes mellitus, without long-term current use of insulin [E11.9]  Yes    HLD (hyperlipidemia) [E78.5]  Yes    Obesity (BMI 30-39.9) [E66.9]  Yes      Resolved Hospital Problems   No resolved problems to display.     Left femur fracture from mechanical fall  History of bilateral knee replacements  -Ortho consulted-plan for OR on 10/25  -Oral and IV pain meds  -RCRI: 0, no further testing needed prior to surgery    Diabetes type 2, A1c 9%  -Hold home Jardiance and metformin  -Low-dose sliding scale     Hypertension  -Continue home amlodipine 10 mg, may need additional agents during hospitalization if blood pressures remain uncontrolled    Hyperlipidemia-continue home statin      I discussed the patient's findings and my recommendations with patient.    VTE Prophylaxis - SCDs.  Code Status - Full code.       Mu Esteves MD  [facility]  10/24/23  10:38 EDT      Electronically signed by Mu Esteves MD at 10/24/23 1040          Emergency Department Notes        Nicole Philip, RN at 10/24/23 0744          Nursing report ED to floor  Sasha MONIQUE Ra  58 y.o.  female    HPI :   Chief Complaint   Patient presents with    Leg Injury       Admitting doctor:   Mu Esteves MD    Admitting diagnosis:   The encounter diagnosis was Closed displaced comminuted fracture of shaft of left femur, initial encounter.    Code status:   Current Code Status       Date Active Code Status Order ID Comments User Context       Not on file            Allergies:   Patient has no known allergies.    Isolation:   No active isolations    Intake and Output  No intake or output data  in the 24 hours ending 10/24/23 0744    Weight:       10/24/23  0554   Weight: 85.3 kg (188 lb)       Most recent vitals:   Vitals:    10/24/23 0701 10/24/23 0726 10/24/23 0731 10/24/23 0737   BP: (!) 215/99  (!) 208/106    Pulse: 78 83 82 85   Resp:       Temp:       TempSrc:       SpO2: 100% 100% 98% 98%   Weight:       Height:           Active LDAs/IV Access:   Lines, Drains & Airways       Active LDAs       Name Placement date Placement time Site Days    Peripheral IV 10/24/23 0624 Anterior;Right Forearm 10/24/23  0624  Forearm  less than 1                    Labs (abnormal labs have a star):   Labs Reviewed   CBC WITH AUTO DIFFERENTIAL - Normal   COMPREHENSIVE METABOLIC PANEL   CBC AND DIFFERENTIAL    Narrative:     The following orders were created for panel order CBC & Differential.  Procedure                               Abnormality         Status                     ---------                               -----------         ------                     CBC Auto Differential[[id number]]        Normal              Final result                 Please view results for these tests on the individual orders.       EKG:   ECG 12 Lead Other; fall   Preliminary Result   HEART RATE= 76  bpm   RR Interval= 789  ms   LA Interval= 187  ms   P Horizontal Axis= 15  deg   P Front Axis= 49  deg   QRSD Interval= 95  ms   QT Interval= 392  ms   QTcB= 441  ms   QRS Axis= 17  deg   T Wave Axis= 9  deg   - NORMAL ECG -   Sinus rhythm   Electronically Signed By:    Date and Time of Study: 2023-10-24 06:14:21          Meds given in ED:   Medications   morphine injection 2 mg (has no administration in time range)   ondansetron (ZOFRAN) injection 8 mg (has no administration in time range)   morphine injection 4 mg (4 mg Intravenous Given 10/24/23 0624)   HYDROmorphone (DILAUDID) injection 0.5 mg (0.5 mg Intravenous Given 10/24/23 0724)       Imaging results:  XR Chest 1 View    Result Date: 10/24/2023  As described.  This report was  finalized on 10/24/2023 7:01 AM by Dr. Irwin Skinner M.D on Workstation: [id number]      XR Hip With or Without Pelvis 2 - 3 View Left    Result Date: 10/24/2023  As described.  This report was finalized on 10/24/2023 7:01 AM by Dr. Irwin Skinner M.D on Workstation: [id number]      XR Femur 2 View Left    Result Date: 10/24/2023  As described.  This report was finalized on 10/24/2023 7:01 AM by Dr. Irwin Skinner M.D on Workstation: [id number]       Ambulatory status:   - bedrest    Social issues:   Social History     Socioeconomic History    Marital status: Single       NIH Stroke Scale:       Nicole Philip RN  10/24/23 07:44 EDT         Electronically signed by Nicole Philip RN at 10/24/23 0745       Leonides Sutton MD at 10/24/23 0715           EMERGENCY DEPARTMENT ENCOUNTER    Room Number:  10/10  PCP: Provider, No Known  Patient Care Team:  Provider, No Known as PCP - General   Independent Historians: Patient, EMS    HPI:  Chief Complaint: Left leg pain    A complete HPI/ROS/PMH/PSH/SH/FH are unobtainable due to: Nothing    Chronic or social conditions impacting patient care (Social Determinants of Health): None  (Financial Resource Strain / Food Insecurity / Transportation Needs / Physical Activity / Stress / Social Connections / Intimate Partner Violence / Housing Stability)    Context: Sasha Knapp is a 58 y.o. female who presents to the ED c/o acute left leg pain.  The patient reports that she was going down the stairs tonight and she lost her balance and fell.  She denies hitting her head.  She reports pain to her left leg.  She denies any pain to her neck or her back.  She denies loss of consciousness.  She is not on blood thinners.  She reports he has had bilateral knee replacements by Dr. Cueto in the past.  She reports severe pain.  She denies syncope.    Review of prior external notes (non-ED) -and- Review of prior external test results outside of this encounter: Laboratory evaluation  10/5/2018 shows normal CMP except for an elevated blood glucose of 346.    Prescription drug monitoring program review:         PAST MEDICAL HISTORY  Active Ambulatory Problems     Diagnosis Date Noted    No Active Ambulatory Problems     Resolved Ambulatory Problems     Diagnosis Date Noted    No Resolved Ambulatory Problems     No Additional Past Medical History         PAST SURGICAL HISTORY  No past surgical history on file.      FAMILY HISTORY  No family history on file.      SOCIAL HISTORY  Social History     Socioeconomic History    Marital status: Single         ALLERGIES  Patient has no known allergies.        REVIEW OF SYSTEMS  Review of Systems  Included in HPI  All systems reviewed and negative except for those discussed in HPI.      PHYSICAL EXAM    I have reviewed the triage vital signs and nursing notes.    ED Triage Vitals [10/24/23 0554]   Temp Heart Rate Resp BP SpO2   97.6 °F (36.4 °C) 78 24 171/99 98 %      Temp src Heart Rate Source Patient Position BP Location FiO2 (%)   Oral -- -- -- --       Physical Exam  GENERAL: Awake, alert, pain distress  SKIN: Warm, dry  HENT: Normocephalic, atraumatic  EYES: no scleral icterus  CV: regular rhythm, regular rate  RESPIRATORY: normal effort, lungs clear  ABDOMEN: soft, nontender, nondistended  MUSCULOSKELETAL: no deformity.  No tenderness to the right lower extremity or bilateral upper extremities.  She has tenderness throughout her left thigh primarily at her left distal thigh.  She has no open wounds.  She has intact pedal pulses.  Normal sensation and motor.  No tenderness to the cervical spine.  NEURO: alert, moves all extremities, follows commands                                                               LAB RESULTS  Recent Results (from the past 24 hour(s))   ECG 12 Lead Other; fall    Collection Time: 10/24/23  6:14 AM   Result Value Ref Range    QT Interval 392 ms    QTC Interval 441 ms   CBC Auto Differential    Collection Time: 10/24/23  6:26  AM    Specimen: Blood   Result Value Ref Range    WBC 6.97 3.40 - 10.80 10*3/mm3    RBC 4.38 3.77 - 5.28 10*6/mm3    Hemoglobin 13.4 12.0 - 15.9 g/dL    Hematocrit 41.0 34.0 - 46.6 %    MCV 93.6 79.0 - 97.0 fL    MCH 30.6 26.6 - 33.0 pg    MCHC 32.7 31.5 - 35.7 g/dL    RDW 13.1 12.3 - 15.4 %    RDW-SD 45.5 37.0 - 54.0 fl    MPV 11.5 6.0 - 12.0 fL    Platelets 232 140 - 450 10*3/mm3    Neutrophil % 67.6 42.7 - 76.0 %    Lymphocyte % 21.4 19.6 - 45.3 %    Monocyte % 5.9 5.0 - 12.0 %    Eosinophil % 4.3 0.3 - 6.2 %    Basophil % 0.4 0.0 - 1.5 %    Immature Grans % 0.4 0.0 - 0.5 %    Neutrophils, Absolute 4.71 1.70 - 7.00 10*3/mm3    Lymphocytes, Absolute 1.49 0.70 - 3.10 10*3/mm3    Monocytes, Absolute 0.41 0.10 - 0.90 10*3/mm3    Eosinophils, Absolute 0.30 0.00 - 0.40 10*3/mm3    Basophils, Absolute 0.03 0.00 - 0.20 10*3/mm3    Immature Grans, Absolute 0.03 0.00 - 0.05 10*3/mm3    nRBC 0.0 0.0 - 0.2 /100 WBC       Ordered the above labs and independently reviewed the results.        RADIOLOGY  XR Chest 1 View, XR Hip With or Without Pelvis 2 - 3 View Left, XR Femur 2 View Left    Result Date: 10/24/2023  XR CHEST 1 VW-, XR FEMUR 2 VW LEFT-, XR HIP W OR WO PELVIS 2-3 VIEW LEFT-  HISTORY: Female who is 58 years-old, trauma  TECHNIQUE: Frontal view of the chest, 3 views of the left femur, frontal view of the pelvis, 2 views of the left hip  COMPARISON: None available  FINDINGS:  Chest x-ray: Heart, mediastinum and pulmonary vasculature are unremarkable. No focal pulmonary consolidation, pleural effusion, or pneumothorax. No acute osseous process.  Pelvis, left hip, left femur: A comminuted, angulated, displaced fracture involving the distal left femoral metaphysis is noted, just above the left knee arthroplasty hardware. The main distal fracture fragment is posteriorly displaced by about 1 shaft width. No other fractures are identified. No dislocation. Hip joint spaces appear preserved.      As described.  This report  was finalized on 10/24/2023 7:01 AM by Dr. Irwin Skinner M.D on Workstation: [id number]       I ordered the above noted radiological studies. Reviewed by me and discussed with radiologist.  See dictation for official radiology interpretation.      PROCEDURES    Procedures      MEDICATIONS GIVEN IN ER    Medications   morphine injection 2 mg (has no administration in time range)   ondansetron (ZOFRAN) injection 8 mg (has no administration in time range)   ethyl alcohol 62 % 2 each (has no administration in time range)   HYDROmorphone (DILAUDID) injection 0.5 mg (has no administration in time range)   HYDROcodone-acetaminophen (NORCO)  MG per tablet 1 tablet (has no administration in time range)     Or   HYDROcodone-acetaminophen (NORCO)  MG per tablet 2 tablet (has no administration in time range)   morphine injection 4 mg (4 mg Intravenous Given 10/24/23 0624)   HYDROmorphone (DILAUDID) injection 0.5 mg (0.5 mg Intravenous Given 10/24/23 0724)         ORDERS PLACED DURING THIS VISIT:  Orders Placed This Encounter   Procedures    XR Chest 1 View    XR Hip With or Without Pelvis 2 - 3 View Left    XR Femur 2 View Left    CT Lower Extremity Left Without Contrast    Comprehensive Metabolic Panel    CBC Auto Differential    Diet: Regular/House Diet; Texture: Regular Texture (IDDSI 7); Fluid Consistency: Thin (IDDSI 0)    NPO Diet NPO Type: Strict NPO    Measure Blood Pressure    Continuous Pulse Oximetry    Discontinue Cardiac Monitoring    Verify Informed Consent    Skin Care    Ortho (on-call MD unless specified)    [facility] (on-call MD unless specified) Details    ECG 12 Lead Other; fall    Inpatient Admission    CBC & Differential         PROGRESS, DATA ANALYSIS, CONSULTS, AND MEDICAL DECISION MAKING    All labs have been independently interpreted by me.  All radiology studies have been reviewed by me and discussed with radiologist dictating the report.   EKG's independently viewed and interpreted by me.   Discussion below represents my analysis of pertinent findings related to patient's condition, differential diagnosis, treatment plan and final disposition.    Differential diagnosis includes but is not limited to hip fracture, knee fracture, femur fracture, hip dislocation, knee dislocation, intracranial hemorrhage, spine fracture.    ED Course as of 10/24/23 0804   Tue Oct 24, 2023   0709 XR Femur 2 View Left  My independent interpretation of the left femur x-ray is distal femur fracture [TR]   0717 I reviewed the work-up and findings with the patient at the bedside.  Answered all questions.  She has a distal left femur fracture.  She has no other signs of trauma.  She is still under significant pain to have added more pain medicine.  Plan admission to the hospital for further management.  She is agreeable. [TR]   0717 EKG          EKG time: 614  Rhythm/Rate: Normal sinus, rate 76  P waves and AK: Normal P, Normal AK  QRS, axis: Narrow QRS, Normal axis  ST and T waves: No acute changes    Independently Interpreted by me  No prior EKG available for comparison [TR]   0734 Discussing with Dr. Beach with A.  He agrees to admit. [TR]      ED Course User Index  [TR] Leonides Sutton MD           AS OF 08:04 EDT VITALS:    BP - (!) 208/106  HR - 85  TEMP - 97.6 °F (36.4 °C) (Oral)  O2 SATS - 98%        DIAGNOSIS  Final diagnoses:   Closed displaced comminuted fracture of shaft of left femur, initial encounter         DISPOSITION  ED Disposition       ED Disposition   Decision to Admit    Condition   --    Comment   Level of Care: Med/Surg [1]   Diagnosis: Closed displaced comminuted fracture of shaft of left femur [724149]   Admitting Physician: FIOR BEACH [[id number]]   Attending Physician: FIOR BEACH [[id number]]   Certification: I Certify That Inpatient Hospital Services Are Medically Necessary For Greater Than 2 Midnights                    Note Disclaimer: At [facility], we believe that sharing information builds  trust and better relationships. You are receiving this note because you recently visited [facility]. It is possible you will see health information before a provider has talked with you about it. This kind of information can be easy to misunderstand. To help you fully understand what it means for your health, we urge you to discuss this note with your provider.         Leonides Sutton MD  10/24/23 0804      Electronically signed by Leonides Sutton MD at 10/24/23 0804       Dalila Mccloud, RN at 10/24/23 0551          To ER via EMS from and fell down approx 6-8 steps.  Pt c/o pain to left distal femur.  Left leg is shortened and externally rotated.  Pt has hx of left knee replacement.      Electronically signed by Dalila Mccloud RN at 10/24/23 0558       Physician Progress Notes (last 24 hours)  Notes from 10/23/23 1100 through 10/24/23 1100   No notes of this type exist for this encounter.          Consult Notes (last 24 hours)        Patrick Anaya PA-C at 10/24/23 0811        Consult Orders    1. Ortho (on-call MD unless specified) [[id number]] ordered by Leonides Sutton MD at 10/24/23 0714                      Orthopaedic Consultation      Patient: Sasha Knapp    Date of Admission: 10/24/2023  5:58 AM    YOB: 1965    Medical Record Number: [id number]    Attending Physician:  Mu Esteves MD    Consulting Physician:  Patrick Anaya PA-C        Chief Complaints: Left leg pain status post fall    History of Present Illness:     The patient is a pleasant 58-year-old female.  She has a past medical history of hypertension, hyperlipidemia, and diabetes mellitus.  She presented to [facility] status post mechanical fall.  Patient was walking down the steps.  She fell down about 6-8 steps.  She felt immediate pain within the left lower extremity.  She was unable to get up.  She was brought to the emergency room.  X-rays of the left lower extremity were performed revealing left  "periprosthetic distal femur fracture.  Patient denies any head trauma.  Denies any loss of consciousness.  Patient is not on any anticoagulation medication.      She does have remote history of bilateral total knee arthroplasties.  By Dr. Cueto.  States that the last surgery was sometime around 2010 or 2011.  Otherwise has been doing well from her knee replacement standpoints.  She does note that she had at some point prior to her knee replacements and infection in which she was initially casted at [facility] and she was \"packed with antibiotics.\"  She then cleared the infection and then subsequently had her knee replaced.      Allergies: No Known Allergies    Medications:   Home Medications:  (Not in a hospital admission)      Current Medications:  Scheduled Meds:ethyl alcohol, 2 swab , Nasal, Once      Continuous Infusions:   PRN Meds:.  HYDROcodone-acetaminophen **OR** HYDROcodone-acetaminophen    HYDROmorphone    Morphine    ondansetron    No past medical history on file.  No past surgical history on file.  Social History     Occupational History    Not on file   Tobacco Use    Smoking status: Not on file    Smokeless tobacco: Not on file   Substance and Sexual Activity    Alcohol use: Not on file    Drug use: Not on file    Sexual activity: Not on file      Social History     Social History Narrative    Not on file     No family history on file.      Review of Systems:   No other pertinent positives or negatives other than what is mentioned in the HPI and below.  Constitutional: Negative for fatigue, fever, or weight loss  HEENT: No active headache.  Pulmonary: Patient denies SOA.  Cardiovascular: Patient denies any chest pain.  Gastrointestinal:  Patient denies active vomiting or diarrhea.  Musculoskeletal: Positive for left leg pain.  Neurological: Patient denies active dizziness or loss of consciousness.  Skin: Patient denies any active bleeding.    Vital signs in last 24 hours:  Temp:  [97.6 °F (36.4 " °C)] 97.6 °F (36.4 °C)  Heart Rate:  [75-85] 85  Resp:  [24] 24  BP: (164-215)/() 208/106  Vitals:    10/24/23 0701 10/24/23 0726 10/24/23 0731 10/24/23 0737   BP: (!) 215/99  (!) 208/106    Pulse: 78 83 82 85   Resp:       Temp:       TempSrc:       SpO2: 100% 100% 98% 98%   Weight:       Height:              Physical Exam: 58 y.o. female         General Appearance:  Alert, cooperative, in no acute distress    HEENT:    Atraumatic, Pupils are equal   Neck:   Cervical spine midline, no appreciable JVD   Lungs:     Breathing non-labored and chest rise symmetric    Heart:   Abdomen:     Rectal:       Extremities:   Pulses  Neurovascular:   Skin:   Musculoskeletal:      Pulse regular    Soft, Non-tender or distended    Deferred        No clubbing, cyanosis, or edema    Intact    Cranial nerves 2 - 12 grossly intact, sensation intact    No skin lesions  Focused physical examination of the patient's left lower extremity was performed.  Patient's resting comfortably in her hospital bed.  She is in no acute distress.  Patient's left lower extremity is shortened.  It is externally rotated.  Previous incision over the left knee is well-healed.  No surrounding erythema.  No appreciable skin lesions.  No skin breakdown.  Range of motion of the left lower extremity was not assessed given the patient's fracture.  Her compartments are soft.  Her sensation is intact distally.  Foot warm and well-perfused.  EHL, FHL, TA, and GS are intact.  DP/TP are 2+.     Diagnostic Tests:    Results from last 7 days   Lab Units 10/24/23  0626   WBC 10*3/mm3 6.97   HEMOGLOBIN g/dL 13.4   HEMATOCRIT % 41.0   PLATELETS 10*3/mm3 232           Study Result    Narrative & Impression   XR CHEST 1 VW-, XR FEMUR 2 VW LEFT-, XR HIP W OR WO PELVIS 2-3 VIEW  LEFT-     HISTORY: Female who is 58 years-old, trauma     TECHNIQUE: Frontal view of the chest, 3 views of the left femur, frontal  view of the pelvis, 2 views of the left hip     COMPARISON:  None available     FINDINGS:     Chest x-ray: Heart, mediastinum and pulmonary vasculature are  unremarkable. No focal pulmonary consolidation, pleural effusion, or  pneumothorax. No acute osseous process.     Pelvis, left hip, left femur: A comminuted, angulated, displaced  fracture involving the distal left femoral metaphysis is noted, just  above the left knee arthroplasty hardware. The main distal fracture  fragment is posteriorly displaced by about 1 shaft width. No other  fractures are identified. No dislocation. Hip joint spaces appear  preserved.     IMPRESSION:  As described.     This report was finalized on 10/24/2023 7:01 AM by Dr. Irwin Skinner M.D on Workstation: [employer]     Scan on 10/24/2023 0614 by [employer], [place]: ECG 12-LEAD         Author: -- Service: -- Author Type: --   Filed: Date of Service: Creation Time:   Status: (Other)   HEART RATE= 76  bpm  RR Interval= 789  ms  ID Interval= 187  ms  P Horizontal Axis= 15  deg  P Front Axis= 49  deg  QRSD Interval= 95  ms  QT Interval= 392  ms  QTcB= 441  ms  QRS Axis= 17  deg  T Wave Axis= 9  deg  - NORMAL ECG -  Sinus rhythm  Electronically Signed By:   Date and Time of Study: 2023-10-24 06:14:21          Assessment:    Closed displaced comminuted fracture of shaft of left femur        Plan:      I did have an extensive discussion with the patient regards her situation in the hospital today.  I have reviewed the above.  Reviewed the x-rays.  Patient has unfortunately suffered a left distal periprosthetic femur fracture.  Discussed with her treatment options.  Operative intervention is warranted at this time for treatment of the fracture.  Given the nature of the fracture we will order a CT scan of her left lower extremity.  Further surgical planning will be based upon review of the CT scan.  Options include but not limited to retrograde nailing versus open reduction internal fixation versus distal femoral replacement.  These were all  discussed with the patient.  She expresses understanding.  Plan will be for surgery tomorrow October 25, 2023 with Dr. Torres dependent on surgeon as well as OR availability.  Patient can go ahead and have a diabetic diet today.  N.p.o. after midnight in anticipation for surgery tomorrow.  Please hold any anticoagulation medication in anticipation for surgery.    All findings will be discussed with my supervising physician.    Thank you very much for this consultation allowing us to be a part of the patient's care.  Further recommendations to follow throughout her hospital course.    Date: 10/24/2023  Patrick Anaya PA-C        Electronically signed by Patrick Anaya PA-C at 10/24/23 0820            All medication doses during the admission are shown, including meds that are no longer on order.  Scheduled Meds Sorted by Name  for Ra Sasha J as of 10/24/23 through 10/24/23    1 Day 3 Days 7 Days 10 Days < Today >   Legend:       Medications 10/24/23   amLODIPine (NORVASC) tablet 10 mg  Dose: 10 mg  Freq: Every 24 Hours Scheduled Route: PO  Start: 10/25/23 0900   Admin Instructions:          ethyl alcohol 62 % 2 each  Dose: 2 swab  Freq: Once Route: NA  Start: 10/24/23 0818   Admin Instructions:       ([id number])                 HYDROmorphone (DILAUDID) injection 0.5 mg  Dose: 0.5 mg  Freq: Once Route: IV  Start: 10/24/23 0730 End: 10/24/23 0724   Admin Instructions:       0724                 insulin lispro (HUMALOG/ADMELOG) injection 2-7 Units  Dose: 2-7 Units  Freq: 4 Times Daily Before Meals & Nightly Route: SC  Start: 10/24/23 0828   Admin Instructions:       (6342) [id number]              morphine injection 4 mg  Dose: 4 mg  Freq: Once Route: IV  Start: 10/24/23 0618 End: 10/24/23 0624   Admin Instructions:       0624                 rosuvastatin (CRESTOR) tablet 40 mg  Dose: 40 mg  Freq: Daily Route: PO  Start: 10/24/23 1200    1200                  sennosides-docusate (PERICOLACE) 8.6-50 MG per tablet  2 tablet  Dose: 2 tablet  Freq: 2 Times Daily Route: PO  Start: 10/24/23 0900   Admin Instructions:       1001   2100                And  polyethylene glycol (MIRALAX) packet 17 g  Dose: 17 g  Freq: Daily PRN Route: PO  PRN Reason: Constipation  PRN Comment: Use if senna-docusate is ineffective  Start: 10/24/23 0809   Admin Instructions:          And  bisacodyl (DULCOLAX) EC tablet 5 mg  Dose: 5 mg  Freq: Daily PRN Route: PO  PRN Reason: Constipation  PRN Comment: Use if polyethylene glycol is ineffective  Start: 10/24/23 0809   Admin Instructions:          And  bisacodyl (DULCOLAX) suppository 10 mg  Dose: 10 mg  Freq: Daily PRN Route: RE  PRN Reason: Constipation  PRN Comment: Use if bisacodyl oral is ineffective  Start: 10/24/23 0809   Admin Instructions:          sodium chloride 0.9 % flush 10 mL  Dose: 10 mL  Freq: Every 12 Hours Scheduled Route: IV  Start: 10/24/23 0900    0957   2100                            Continuous Meds Sorted by Name  for Sasha Knapp as of 10/24/23 through 10/24/23  Legend:       Medications 10/24/23               PRN Meds Sorted by Name  for Sasha Knapp as of 10/24/23 through 10/24/23  Legend:       Medications 10/24/23    acetaminophen (TYLENOL) tablet 650 mg  Dose: 650 mg  Freq: Every 4 Hours PRN Route: PO  PRN Reason: Mild Pain  Start: 10/24/23 0810   Admin Instructions:          Or  acetaminophen (TYLENOL) 160 MG/5ML oral solution 650 mg  Dose: 650 mg  Freq: Every 4 Hours PRN Route: PO  PRN Reason: Mild Pain  Start: 10/24/23 0810   Admin Instructions:          Or  acetaminophen (TYLENOL) suppository 650 mg  Dose: 650 mg  Freq: Every 4 Hours PRN Route: RE  PRN Reason: Mild Pain  Start: 10/24/23 0810   Admin Instructions:           sennosides-docusate (PERICOLACE) 8.6-50 MG per tablet 2 tablet  Dose: 2 tablet  Freq: 2 Times Daily Route: PO  Start: 10/24/23 0900   Admin Instructions:       1001   2100                And  polyethylene glycol (MIRALAX) packet 17  g  Dose: 17 g  Freq: Daily PRN Route: PO  PRN Reason: Constipation  PRN Comment: Use if senna-docusate is ineffective  Start: 10/24/23 0809   Admin Instructions:          And  bisacodyl (DULCOLAX) EC tablet 5 mg  Dose: 5 mg  Freq: Daily PRN Route: PO  PRN Reason: Constipation  PRN Comment: Use if polyethylene glycol is ineffective  Start: 10/24/23 0809   Admin Instructions:          And  bisacodyl (DULCOLAX) suppository 10 mg  Dose: 10 mg  Freq: Daily PRN Route: RE  PRN Reason: Constipation  PRN Comment: Use if bisacodyl oral is ineffective  Start: 10/24/23 0809   Admin Instructions:          dextrose (D50W) (25 g/50 mL) IV injection 25 g  Dose: 25 g  Freq: Every 15 Minutes PRN Route: IV  PRN Reason: Low Blood Sugar  PRN Comment: Blood Sugar Less Than 70  Start: 10/24/23 0812   Admin Instructions:          dextrose (GLUTOSE) oral gel 15 g  Dose: 15 g  Freq: Every 15 Minutes PRN Route: PO  PRN Reason: Low Blood Sugar  PRN Comment: Blood sugar less than 70  Start: 10/24/23 0811   Admin Instructions:          glucagon (GLUCAGEN) injection 1 mg  Dose: 1 mg  Freq: Every 15 Minutes PRN Route: IM  PRN Reason: Low Blood Sugar  PRN Comment: Blood Glucose Less Than 70  Start: 10/24/23 0812   Admin Instructions:           HYDROcodone-acetaminophen (NORCO)  MG per tablet 1 tablet  Dose: 1 tablet  Freq: Every 4 Hours PRN Route: PO  PRN Reason: Moderate Pain  Start: 10/24/23 0803 End: 10/31/23 0802   Admin Instructions:          Or  HYDROcodone-acetaminophen (NORCO)  MG per tablet 2 tablet  Dose: 2 tablet  Freq: Every 4 Hours PRN Route: PO  PRN Reason: Severe Pain  Start: 10/24/23 0803 End: 10/31/23 0802   Admin Instructions:          HYDROcodone-acetaminophen (NORCO) 5-325 MG per tablet 1 tablet  Dose: 1 tablet  Freq: Every 4 Hours PRN Route: PO  PRN Reason: Moderate Pain  Start: 10/24/23 0811 End: 10/31/23 0810   Admin Instructions:          HYDROmorphone (DILAUDID) injection 0.5 mg  Dose: 0.5 mg  Freq: Every 2  Hours PRN Route: IV  PRN Reason: Severe Pain  Start: 10/24/23 0803 End: 10/31/23 0802   Admin Instructions:       0957                 labetalol (NORMODYNE,TRANDATE) injection 10 mg  Dose: 10 mg  Freq: Every 6 Hours PRN Route: IV  PRN Reason: High Blood Pressure  PRN Comment: SBP >180  Start: 10/24/23 0813   Admin Instructions:       0851                 melatonin tablet 5 mg  Dose: 5 mg  Freq: Nightly PRN Route: PO  PRN Reason: Sleep  Start: 10/24/23 0811       morphine injection 2 mg  Dose: 2 mg  Freq: Every 1 Hour PRN Route: IV  PRN Reason: Severe Pain  Start: 10/24/23 0601   Admin Instructions:           morphine injection 4 mg  Dose: 4 mg  Freq: Every 4 Hours PRN Route: IV  PRN Reason: Moderate Pain  Start: 10/24/23 0811 End: 10/31/23 0810   Admin Instructions:          And  naloxone (NARCAN) injection 0.4 mg  Dose: 0.4 mg  Freq: Every 5 Minutes PRN Route: IV  PRN Reason: Respiratory Depression  Start: 10/24/23 0811   Admin Instructions:           ondansetron (ZOFRAN) tablet 4 mg  Dose: 4 mg  Freq: Every 6 Hours PRN Route: PO  PRN Reasons: Nausea,Vomiting  Start: 10/24/23 0811   Admin Instructions:          Or  ondansetron (ZOFRAN) injection 4 mg  Dose: 4 mg  Freq: Every 6 Hours PRN Route: IV  PRN Reasons: Nausea,Vomiting  Start: 10/24/23 0811   Admin Instructions:          ondansetron (ZOFRAN) injection 8 mg  Dose: 8 mg  Freq: Once As Needed Route: IV  PRN Reasons: Nausea,Vomiting  Start: 10/24/23 0601       sodium chloride 0.9 % flush 10 mL  Dose: 10 mL  Freq: As Needed Route: IV  PRN Reason: Line Care  Start: 10/24/23 0809       sodium chloride 0.9 % infusion 40 mL  Dose: 40 mL  Freq: As Needed Route: IV  PRN Reason: Line Care  Start: 10/24/23 0809   Admin Instructions:

## 2023-10-24 NOTE — H&P
Patient Name:  Sasha Knapp  YOB: 1965  MRN:  [id number]  Admit Date:  10/24/2023  Patient Care Team:  Provider, No Known as PCP - General      Subjective   History Present Illness     Chief Complaint   Patient presents with    Leg Injury         History of Present Illness  Ms. Knapp is a 58 y.o. with possible history of hypertension, hyperlipidemia, diabetes presenting from home after mechanical fall down half a flight of stairs.  And immediately felt pain in her left lower knee.  States she lost her balance and fell.  Denies any dizziness, palpitations, chest pain, dyspnea.  Does not take any blood thinners, did not hit head, did not lose consciousness.  Patient states that she is already taken her amlodipine this morning around 4:30 AM.    Review of Systems   Constitutional:  Negative for chills and fever.   Respiratory:  Negative for cough and shortness of breath.    Cardiovascular:  Negative for chest pain and leg swelling.   Gastrointestinal:  Negative for abdominal pain, diarrhea and nausea.   Genitourinary:  Negative for dysuria and frequency.   Musculoskeletal:         Left knee pain   Neurological:  Negative for dizziness, seizures and weakness.        Personal History     Past Medical History:   Diagnosis Date    Diabetes mellitus     Hyperlipidemia     Hypertension     Sleep apnea      Past Surgical History:   Procedure Laterality Date    CARPAL TUNNEL RELEASE Left     HYSTERECTOMY  2000    REPLACEMENT TOTAL KNEE BILATERAL Bilateral      History reviewed. No pertinent family history.  Social History     Tobacco Use    Smoking status: Never    Smokeless tobacco: Never   Substance Use Topics    Alcohol use: Not Currently    Drug use: Never     No current facility-administered medications on file prior to encounter.     Current Outpatient Medications on File Prior to Encounter   Medication Sig Dispense Refill    amLODIPine (NORVASC) 10 MG tablet Take 1 tablet by mouth Daily.       Jardiance 25 MG tablet tablet Take 1 tablet by mouth Daily.      metFORMIN (GLUCOPHAGE) 500 MG tablet Take 1 tablet by mouth 2 (Two) Times a Day With Meals.      rosuvastatin (CRESTOR) 40 MG tablet Take 1 tablet by mouth Daily.       No Known Allergies    Objective    Objective     Vital Signs  Temp:  [97.6 °F (36.4 °C)-97.7 °F (36.5 °C)] 97.7 °F (36.5 °C)  Heart Rate:  [69-87] 69  Resp:  [22-24] 22  BP: (135-215)/() 135/66  SpO2:  [98 %-100 %] 99 %  on   ;   Device (Oxygen Therapy): room air  Body mass index is 32.27 kg/m².    Physical Exam  Constitutional:       General: She is not in acute distress.     Appearance: She is not ill-appearing.      Comments: Obese   Cardiovascular:      Rate and Rhythm: Normal rate and regular rhythm.   Pulmonary:      Effort: Pulmonary effort is normal. No respiratory distress.   Abdominal:      General: Abdomen is flat. There is no distension.      Tenderness: There is no abdominal tenderness.   Musculoskeletal:         General: No swelling or deformity. Normal range of motion.      Comments: Left knee tender.  Left extremity sensation and pulses intact.   Skin:     General: Skin is warm and dry.   Neurological:      General: No focal deficit present.      Mental Status: She is alert. Mental status is at baseline.         Results Review:  I reviewed the patient's new clinical results.  I reviewed the patient's new imaging results and agree with the interpretation.  I reviewed the patient's other test results and agree with the interpretation  I personally viewed and interpreted the patient's EKG/Telemetry data  Discussed with ED provider.    Lab Results (last 24 hours)       Procedure Component Value Units Date/Time    CBC & Differential [[id number]]  (Normal) Collected: 10/24/23 0626    Specimen: Blood Updated: 10/24/23 0635    Narrative:      The following orders were created for panel order CBC & Differential.  Procedure                               Abnormality          Status                     ---------                               -----------         ------                     CBC Auto Differential[[id number]]        Normal              Final result                 Please view results for these tests on the individual orders.    CBC Auto Differential [[id number]]  (Normal) Collected: 10/24/23 0626    Specimen: Blood Updated: 10/24/23 0635     WBC 6.97 10*3/mm3      RBC 4.38 10*6/mm3      Hemoglobin 13.4 g/dL      Hematocrit 41.0 %      MCV 93.6 fL      MCH 30.6 pg      MCHC 32.7 g/dL      RDW 13.1 %      RDW-SD 45.5 fl      MPV 11.5 fL      Platelets 232 10*3/mm3      Neutrophil % 67.6 %      Lymphocyte % 21.4 %      Monocyte % 5.9 %      Eosinophil % 4.3 %      Basophil % 0.4 %      Immature Grans % 0.4 %      Neutrophils, Absolute 4.71 10*3/mm3      Lymphocytes, Absolute 1.49 10*3/mm3      Monocytes, Absolute 0.41 10*3/mm3      Eosinophils, Absolute 0.30 10*3/mm3      Basophils, Absolute 0.03 10*3/mm3      Immature Grans, Absolute 0.03 10*3/mm3      nRBC 0.0 /100 WBC     Hemoglobin A1c [[id number]]  (Abnormal) Collected: 10/24/23 0626    Specimen: Blood Updated: 10/24/23 0833     Hemoglobin A1C 9.00 %     Narrative:      Hemoglobin A1C Ranges:    Increased Risk for Diabetes  5.7% to 6.4%  Diabetes                     >= 6.5%  Diabetic Goal                < 7.0%    Comprehensive Metabolic Panel [[id number]]  (Abnormal) Collected: 10/24/23 0756    Specimen: Blood Updated: 10/24/23 0829     Glucose 208 mg/dL      BUN 24 mg/dL      Creatinine 1.00 mg/dL      Sodium 139 mmol/L      Potassium 4.4 mmol/L      Chloride 105 mmol/L      CO2 24.0 mmol/L      Calcium 9.6 mg/dL      Total Protein 7.0 g/dL      Albumin 4.1 g/dL      ALT (SGPT) 17 U/L      AST (SGOT) 21 U/L      Alkaline Phosphatase 97 U/L      Total Bilirubin 0.5 mg/dL      Globulin 2.9 gm/dL      A/G Ratio 1.4 g/dL      BUN/Creatinine Ratio 24.0     Anion Gap 10.0 mmol/L      eGFR 65.4 mL/min/1.73     Narrative:      GFR  Normal >60  Chronic Kidney Disease <60  Kidney Failure <15      Protime-INR [[id number]]  (Normal) Collected: 10/24/23 0856    Specimen: Blood Updated: 10/24/23 0924     Protime 13.3 Seconds      INR 1.00    POC Glucose Once [[id number]]  (Abnormal) Collected: 10/24/23 0856    Specimen: Blood Updated: 10/24/23 0858     Glucose 196 mg/dL     POC Glucose Once [[id number]]  (Abnormal) Collected: 10/24/23 1004    Specimen: Blood Updated: 10/24/23 1006     Glucose 173 mg/dL             Imaging Results (Last 24 Hours)       Procedure Component Value Units Date/Time    CT Lower Extremity Left Without Contrast [[id number]] Collected: 10/24/23 1006     Updated: 10/24/23 1006    Narrative:      CT LEFT FEMUR WITHOUT CONTRAST     HISTORY: Evaluate periprosthetic distal femur fracture.     TECHNIQUE: Axial noncontrast left femur CT from proximal to the mid  femoral shaft level to below the knee is provided and correlated with  femur and hip x-rays from earlier this morning. Radiation dose reduction  techniques were utilized, including automated exposure control and  exposure modulation based on body size.     FINDINGS: There is a total knee arthroplasty hardware and a comminuted,  displaced distal femoral diametaphysis fracture with posterior  displacement of the distal femoral fragment a full bone width. The  distal portion of the fracture extends to the upper edge of the femoral  component anteriorly. The fracture planes do not appear to extend beyond  that level. The patella, proximal tibia and fibula are intact. There is  no evidence of underlying bone lesion.     The significant posterior displacement of the knee relative to the  proximal femur fracture fragment significantly deforms to the quadriceps  tendon bundle but that structure appears to remain intact.       Impression:      Comminuted distal left femoral diametaphysis fracture  extends to the level of the femoral component anteriorly along the  midline. The remainder  of the fracture appears to terminate at the level  of the metaphysis.       XR Chest 1 View [[id number]] Collected: 10/24/23 0659     Updated: 10/24/23 0704    Narrative:      XR CHEST 1 VW-, XR FEMUR 2 VW LEFT-, XR HIP W OR WO PELVIS 2-3 VIEW  LEFT-     HISTORY: Female who is 58 years-old, trauma     TECHNIQUE: Frontal view of the chest, 3 views of the left femur, frontal  view of the pelvis, 2 views of the left hip     COMPARISON: None available     FINDINGS:     Chest x-ray: Heart, mediastinum and pulmonary vasculature are  unremarkable. No focal pulmonary consolidation, pleural effusion, or  pneumothorax. No acute osseous process.     Pelvis, left hip, left femur: A comminuted, angulated, displaced  fracture involving the distal left femoral metaphysis is noted, just  above the left knee arthroplasty hardware. The main distal fracture  fragment is posteriorly displaced by about 1 shaft width. No other  fractures are identified. No dislocation. Hip joint spaces appear  preserved.       Impression:      As described.     This report was finalized on 10/24/2023 7:01 AM by Dr. Irwin Skinner M.D on Workstation: [id number]       XR Hip With or Without Pelvis 2 - 3 View Left [[id number]] Collected: 10/24/23 0659     Updated: 10/24/23 0704    Narrative:      XR CHEST 1 VW-, XR FEMUR 2 VW LEFT-, XR HIP W OR WO PELVIS 2-3 VIEW  LEFT-     HISTORY: Female who is 58 years-old, trauma     TECHNIQUE: Frontal view of the chest, 3 views of the left femur, frontal  view of the pelvis, 2 views of the left hip     COMPARISON: None available     FINDINGS:     Chest x-ray: Heart, mediastinum and pulmonary vasculature are  unremarkable. No focal pulmonary consolidation, pleural effusion, or  pneumothorax. No acute osseous process.     Pelvis, left hip, left femur: A comminuted, angulated, displaced  fracture involving the distal left femoral metaphysis is noted, just  above the left knee arthroplasty hardware. The main distal  fracture  fragment is posteriorly displaced by about 1 shaft width. No other  fractures are identified. No dislocation. Hip joint spaces appear  preserved.       Impression:      As described.     This report was finalized on 10/24/2023 7:01 AM by Dr. Irwin Skinner M.D on Workstation: [id number]       XR Femur 2 View Left [[id number]] Collected: 10/24/23 0659     Updated: 10/24/23 0704    Narrative:      XR CHEST 1 VW-, XR FEMUR 2 VW LEFT-, XR HIP W OR WO PELVIS 2-3 VIEW  LEFT-     HISTORY: Female who is 58 years-old, trauma     TECHNIQUE: Frontal view of the chest, 3 views of the left femur, frontal  view of the pelvis, 2 views of the left hip     COMPARISON: None available     FINDINGS:     Chest x-ray: Heart, mediastinum and pulmonary vasculature are  unremarkable. No focal pulmonary consolidation, pleural effusion, or  pneumothorax. No acute osseous process.     Pelvis, left hip, left femur: A comminuted, angulated, displaced  fracture involving the distal left femoral metaphysis is noted, just  above the left knee arthroplasty hardware. The main distal fracture  fragment is posteriorly displaced by about 1 shaft width. No other  fractures are identified. No dislocation. Hip joint spaces appear  preserved.       Impression:      As described.     This report was finalized on 10/24/2023 7:01 AM by Dr. Irwin Skinner M.D on Workstation: [id number]                   ECG 12 Lead Other; fall   Final Result   HEART RATE= 76  bpm   RR Interval= 789  ms   ME Interval= 187  ms   P Horizontal Axis= 15  deg   P Front Axis= 49  deg   QRSD Interval= 95  ms   QT Interval= 392  ms   QTcB= 441  ms   QRS Axis= 17  deg   T Wave Axis= 9  deg   - NORMAL ECG -   Sinus rhythm   No Prior Tracing for Comparison   Electronically Signed By: Ernestina Ross ([facility]) 24-Oct-2023 10:21:47   Date and Time of Study: 2023-10-24 06:14:21           Assessment/Plan     Active Hospital Problems    Diagnosis  POA    **Closed displaced comminuted  fracture of shaft of left femur [S72.352A]  Yes    HTN (hypertension) [I10]  Yes    Type 2 diabetes mellitus, without long-term current use of insulin [E11.9]  Yes    HLD (hyperlipidemia) [E78.5]  Yes    Obesity (BMI 30-39.9) [E66.9]  Yes      Resolved Hospital Problems   No resolved problems to display.     Left femur fracture from mechanical fall  History of bilateral knee replacements  -Ortho consulted-plan for OR on 10/25  -Oral and IV pain meds  -RCRI: 0, no further testing needed prior to surgery    Diabetes type 2, A1c 9%  -Hold home Jardiance and metformin  -Low-dose sliding scale     Hypertension  -Continue home amlodipine 10 mg, may need additional agents during hospitalization if blood pressures remain uncontrolled    Hyperlipidemia-continue home statin      I discussed the patient's findings and my recommendations with patient.    VTE Prophylaxis - SCDs.  Code Status - Full code.       Mu Esteves MD  [facility]  10/24/23  10:38 EDT

## 2023-10-24 NOTE — DISCHARGE PLACEMENT REQUEST
"Adair Rea (58 y.o. Female)       Date of Birth   1965    Social Security Number       Address   [address] [place] [address]    Home Phone   [phone]    MRN   [id number]       Hindu   [facility]    Marital Status   Single                            Admission Date   10/24/23    Admission Type   Emergency    Admitting Provider   Mu Esteves MD    Attending Provider   Mu Esteves MD    Department, Room/Bed   [address], P481/1       Discharge Date       Discharge Disposition       Discharge Destination                                 Attending Provider: Mu Esteves MD    Allergies: No Known Allergies    Isolation: None   Infection: None   Code Status: CPR    Ht: 162.6 cm (64\")   Wt: 85.3 kg (188 lb)    Admission Cmt: None   Principal Problem: Closed displaced comminuted fracture of shaft of left femur [S72.352A]                   Active Insurance as of 10/24/2023       Primary Coverage       Payor Plan Insurance Group Employer/Plan Group    ANTHEM BLUE CROSS ANTHEM BLUE CROSS BLUE SHIELD PPO [id number]       Payor Plan Address Payor Plan Phone Number Payor Plan Fax Number Effective Dates    [address] [phone]  1/1/2023 - None Entered    [facility] [id number]         Subscriber Name Subscriber Birth Date Member ID       ADAIR REA 1965 [id number]               Secondary Coverage       Payor Plan Insurance Group Employer/Plan Group    [place] [id number]       Payor Plan Address Payor Plan Phone Number Payor Plan Fax Number Effective Dates    [employer] [address] [address] [phone]  1/1/2021 - None Entered    [facility] [id number]         Subscriber Name Subscriber Birth Date Member ID       ADAIR REA 1965 [id number]                     Emergency Contacts        (Rel.) Home Phone Work Phone Mobile Phone    Cristina Najeramelissa (Brother) -- -- [phone]                "

## 2023-10-24 NOTE — CASE MANAGEMENT/SOCIAL WORK
Discharge Planning Assessment  [facility]     Patient Name: Sasha Knapp  MRN: [id number]  Today's Date: 10/24/2023    Admit Date: 10/24/2023    Plan: SNF, referrals pending   Discharge Needs Assessment       Row Name 10/24/23 1510       Living Environment    People in Home alone    Current Living Arrangements other (see comments)  [facility]    Potentially Unsafe Housing Conditions none    Primary Care Provided by self    Provides Primary Care For no one    Family Caregiver if Needed sibling(s)    Quality of Family Relationships helpful;involved;supportive    Able to Return to Prior Arrangements no       Resource/Environmental Concerns    Resource/Environmental Concerns home accessibility    Home Accessibility Concerns stairs to access bedroom or bathroom    Transportation Concerns none       Transition Planning    Patient/Family Anticipates Transition to inpatient rehabilitation facility    Patient/Family Anticipated Services at Transition rehabilitation services    Transportation Anticipated health plan transportation       Discharge Needs Assessment    Readmission Within the Last 30 Days no previous admission in last 30 days    Equipment Currently Used at Home cane, straight;cane, quad tip;walker, rolling    Concerns to be Addressed discharge planning    Anticipated Changes Related to Illness none    Equipment Needed After Discharge none    Outpatient/Agency/Support Group Needs inpatient rehabilitation facility    Discharge Facility/Level of Care Needs rehabilitation facility    Provided Post Acute Provider List? Yes    Post Acute Provider List Inpatient Rehab    Provided Post Acute Provider Quality & Resource List? Yes    Post Acute Provider Quality and Resource List Inpatient Rehab    Delivered To Patient    Method of Delivery In person                   Discharge Plan       Row Name 10/24/23 1513       Plan    Plan SNF, referrals pending    Patient/Family in Agreement with Plan yes    Plan Comments  CCP spoke with the patient and confirmed the information on her face sheet was accurate. Patient states that she lives in a townhouse alone. She states the home has an upstairs and a downstairs. She said her bedroom is located upstairs. She denies any steps to enter the home. Patient states her PCP is Ne Strauss at [facility]. Patient denies a history of HH. Patient states she has been to SNF in the past but can't remember the name of the facility. Patient is currently enrolled in the [facility] program but the pharmacy will need to be updated to SNF facility before discharge. Patient states she uses a straight and a quad cane as well as a rolling walker. Patient states her friend Jose Eduardo Carrillo can transport her at discharge. CCP discussed SNF at discharge and she requested referrals be made in the [facility]. CCP to follow. IRISH, RUSSW.                  Continued Care and Services - Admitted Since 10/24/2023    Coordination has not been started for this encounter.          Demographic Summary       Row Name 10/24/23 1311       General Information    Admission Type inpatient    Arrived From emergency department    Referral Source admission list    Reason for Consult discharge planning    Preferred Language English                   Functional Status       Row Name 10/24/23 8541       Functional Status    Usual Activity Tolerance good    Current Activity Tolerance moderate       Functional Status, IADL    Medications assistive equipment    Meal Preparation assistive equipment    Housekeeping assistive equipment    Laundry assistive equipment    Shopping assistive equipment       Mental Status    General Appearance WDL WDL       Mental Status Summary    Recent Changes in Mental Status/Cognitive Functioning no changes       Employment/    Employment Status employed full-time                   Psychosocial    No documentation.                  Abuse/Neglect    No documentation.                   Legal    No documentation.                  Substance Abuse    No documentation.                  Patient Forms    No documentation.

## 2023-10-24 NOTE — PLAN OF CARE
Goal Outcome Evaluation:  Plan of Care Reviewed With: patient        Progress: no change     Pt AxOx4, calm and cooperative, VSS. Meds given whole with water, see Mar. PRN Dilaudid given x2 for pain control, with good effect. Foot pump SCDs on. AccuMax on, Heating pad on per order. Pt refusing turns due to pain, education provided. Purewick in place. Bedrest. Plan: NPO after Midnight, possible ortho surgery in the morning. RN will continue to monitor.

## 2023-10-24 NOTE — ED NOTES
Nursing report ED to floor  Sasha Knapp  58 y.o.  female    HPI :   Chief Complaint   Patient presents with    Leg Injury       Admitting doctor:   Mu Esteves MD    Admitting diagnosis:   The encounter diagnosis was Closed displaced comminuted fracture of shaft of left femur, initial encounter.    Code status:   Current Code Status       Date Active Code Status Order ID Comments User Context       Not on file            Allergies:   Patient has no known allergies.    Isolation:   No active isolations    Intake and Output  No intake or output data in the 24 hours ending 10/24/23 0744    Weight:       10/24/23  0554   Weight: 85.3 kg (188 lb)       Most recent vitals:   Vitals:    10/24/23 0701 10/24/23 0726 10/24/23 0731 10/24/23 0737   BP: (!) 215/99  (!) 208/106    Pulse: 78 83 82 85   Resp:       Temp:       TempSrc:       SpO2: 100% 100% 98% 98%   Weight:       Height:           Active LDAs/IV Access:   Lines, Drains & Airways       Active LDAs       Name Placement date Placement time Site Days    Peripheral IV 10/24/23 0624 Anterior;Right Forearm 10/24/23  0624  Forearm  less than 1                    Labs (abnormal labs have a star):   Labs Reviewed   CBC WITH AUTO DIFFERENTIAL - Normal   COMPREHENSIVE METABOLIC PANEL   CBC AND DIFFERENTIAL    Narrative:     The following orders were created for panel order CBC & Differential.  Procedure                               Abnormality         Status                     ---------                               -----------         ------                     CBC Auto Differential[[id number]]        Normal              Final result                 Please view results for these tests on the individual orders.       EKG:   ECG 12 Lead Other; fall   Preliminary Result   HEART RATE= 76  bpm   RR Interval= 789  ms   SC Interval= 187  ms   P Horizontal Axis= 15  deg   P Front Axis= 49  deg   QRSD Interval= 95  ms   QT Interval= 392  ms   QTcB= 441  ms   QRS Axis= 17   deg   T Wave Axis= 9  deg   - NORMAL ECG -   Sinus rhythm   Electronically Signed By:    Date and Time of Study: 2023-10-24 06:14:21          Meds given in ED:   Medications   morphine injection 2 mg (has no administration in time range)   ondansetron (ZOFRAN) injection 8 mg (has no administration in time range)   morphine injection 4 mg (4 mg Intravenous Given 10/24/23 0624)   HYDROmorphone (DILAUDID) injection 0.5 mg (0.5 mg Intravenous Given 10/24/23 0724)       Imaging results:  XR Chest 1 View    Result Date: 10/24/2023  As described.  This report was finalized on 10/24/2023 7:01 AM by Dr. Irwin Skinner M.D on Workstation: [id number]      XR Hip With or Without Pelvis 2 - 3 View Left    Result Date: 10/24/2023  As described.  This report was finalized on 10/24/2023 7:01 AM by Dr. Irwin Skinner M.D on Workstation: [id number]      XR Femur 2 View Left    Result Date: 10/24/2023  As described.  This report was finalized on 10/24/2023 7:01 AM by Dr. Irwin Skinner M.D on Workstation: [id number]       Ambulatory status:   - bedrest    Social issues:   Social History     Socioeconomic History    Marital status: Single       NIH Stroke Scale:       Nicole Philip RN  10/24/23 07:44 EDT

## 2023-10-24 NOTE — CONSULTS
"     Orthopaedic Consultation      Patient: Sasha Knapp    Date of Admission: 10/24/2023  5:58 AM    YOB: 1965    Medical Record Number: [id number]    Attending Physician:  Mu Esteves MD    Consulting Physician:  Patrick Anaya PA-C        Chief Complaints: Left leg pain status post fall    History of Present Illness:     The patient is a pleasant 58-year-old female.  She has a past medical history of hypertension, hyperlipidemia, and diabetes mellitus.  She presented to [facility] status post mechanical fall.  Patient was walking down the steps.  She fell down about 6-8 steps.  She felt immediate pain within the left lower extremity.  She was unable to get up.  She was brought to the emergency room.  X-rays of the left lower extremity were performed revealing left periprosthetic distal femur fracture.  Patient denies any head trauma.  Denies any loss of consciousness.  Patient is not on any anticoagulation medication.      She does have remote history of bilateral total knee arthroplasties.  By Dr. Cueto.  States that the last surgery was sometime around 2010 or 2011.  Otherwise has been doing well from her knee replacement standpoints.  She does note that she had at some point prior to her knee replacements and infection in which she was initially casted at [facility] and she was \"packed with antibiotics.\"  She then cleared the infection and then subsequently had her knee replaced.      Allergies: No Known Allergies    Medications:   Home Medications:  (Not in a hospital admission)      Current Medications:  Scheduled Meds:ethyl alcohol, 2 swab , Nasal, Once      Continuous Infusions:   PRN Meds:.  HYDROcodone-acetaminophen **OR** HYDROcodone-acetaminophen    HYDROmorphone    Morphine    ondansetron    No past medical history on file.  No past surgical history on file.  Social History     Occupational History    Not on file   Tobacco Use    Smoking status: Not on file    " Smokeless tobacco: Not on file   Substance and Sexual Activity    Alcohol use: Not on file    Drug use: Not on file    Sexual activity: Not on file      Social History     Social History Narrative    Not on file     No family history on file.      Review of Systems:   No other pertinent positives or negatives other than what is mentioned in the HPI and below.  Constitutional: Negative for fatigue, fever, or weight loss  HEENT: No active headache.  Pulmonary: Patient denies SOA.  Cardiovascular: Patient denies any chest pain.  Gastrointestinal:  Patient denies active vomiting or diarrhea.  Musculoskeletal: Positive for left leg pain.  Neurological: Patient denies active dizziness or loss of consciousness.  Skin: Patient denies any active bleeding.    Vital signs in last 24 hours:  Temp:  [97.6 °F (36.4 °C)] 97.6 °F (36.4 °C)  Heart Rate:  [75-85] 85  Resp:  [24] 24  BP: (164-215)/() 208/106  Vitals:    10/24/23 0701 10/24/23 0726 10/24/23 0731 10/24/23 0737   BP: (!) 215/99  (!) 208/106    Pulse: 78 83 82 85   Resp:       Temp:       TempSrc:       SpO2: 100% 100% 98% 98%   Weight:       Height:              Physical Exam: 58 y.o. female         General Appearance:  Alert, cooperative, in no acute distress    HEENT:    Atraumatic, Pupils are equal   Neck:   Cervical spine midline, no appreciable JVD   Lungs:     Breathing non-labored and chest rise symmetric    Heart:   Abdomen:     Rectal:       Extremities:   Pulses  Neurovascular:   Skin:   Musculoskeletal:      Pulse regular    Soft, Non-tender or distended    Deferred        No clubbing, cyanosis, or edema    Intact    Cranial nerves 2 - 12 grossly intact, sensation intact    No skin lesions  Focused physical examination of the patient's left lower extremity was performed.  Patient's resting comfortably in her hospital bed.  She is in no acute distress.  Patient's left lower extremity is shortened.  It is externally rotated.  Previous incision over the  left knee is well-healed.  No surrounding erythema.  No appreciable skin lesions.  No skin breakdown.  Range of motion of the left lower extremity was not assessed given the patient's fracture.  Her compartments are soft.  Her sensation is intact distally.  Foot warm and well-perfused.  EHL, FHL, TA, and GS are intact.  DP/TP are 2+.     Diagnostic Tests:    Results from last 7 days   Lab Units 10/24/23  0626   WBC 10*3/mm3 6.97   HEMOGLOBIN g/dL 13.4   HEMATOCRIT % 41.0   PLATELETS 10*3/mm3 232           Study Result    Narrative & Impression   XR CHEST 1 VW-, XR FEMUR 2 VW LEFT-, XR HIP W OR WO PELVIS 2-3 VIEW  LEFT-     HISTORY: Female who is 58 years-old, trauma     TECHNIQUE: Frontal view of the chest, 3 views of the left femur, frontal  view of the pelvis, 2 views of the left hip     COMPARISON: None available     FINDINGS:     Chest x-ray: Heart, mediastinum and pulmonary vasculature are  unremarkable. No focal pulmonary consolidation, pleural effusion, or  pneumothorax. No acute osseous process.     Pelvis, left hip, left femur: A comminuted, angulated, displaced  fracture involving the distal left femoral metaphysis is noted, just  above the left knee arthroplasty hardware. The main distal fracture  fragment is posteriorly displaced by about 1 shaft width. No other  fractures are identified. No dislocation. Hip joint spaces appear  preserved.     IMPRESSION:  As described.     This report was finalized on 10/24/2023 7:01 AM by Dr. Iwrin Skinner M.D on Workstation: [id number]     Scan on 10/24/2023 0614 by [facility]: ECG 12-LEAD         Author: -- Service: -- Author Type: --   Filed: Date of Service: Creation Time:   Status: (Other)   HEART RATE= 76  bpm  RR Interval= 789  ms  IA Interval= 187  ms  P Horizontal Axis= 15  deg  P Front Axis= 49  deg  QRSD Interval= 95  ms  QT Interval= 392  ms  QTcB= 441  ms  QRS Axis= 17  deg  T Wave Axis= 9  deg  - NORMAL ECG -  Sinus rhythm  Electronically  Signed By:   Date and Time of Study: 2023-10-24 06:14:21          Assessment:    Closed displaced comminuted fracture of shaft of left femur        Plan:      I did have an extensive discussion with the patient regards her situation in the hospital today.  I have reviewed the above.  Reviewed the x-rays.  Patient has unfortunately suffered a left distal periprosthetic femur fracture.  Discussed with her treatment options.  Operative intervention is warranted at this time for treatment of the fracture.  Given the nature of the fracture we will order a CT scan of her left lower extremity.  Further surgical planning will be based upon review of the CT scan.  Options include but not limited to retrograde nailing versus open reduction internal fixation versus distal femoral replacement.  These were all discussed with the patient.  She expresses understanding.  Plan will be for surgery tomorrow October 25, 2023 with Dr. Torres dependent on surgeon as well as OR availability.  Patient can go ahead and have a diabetic diet today.  N.p.o. after midnight in anticipation for surgery tomorrow.  Please hold any anticoagulation medication in anticipation for surgery.    All findings will be discussed with my supervising physician.    Thank you very much for this consultation allowing us to be a part of the patient's care.  Further recommendations to follow throughout her hospital course.    Date: 10/24/2023  Patrick Anaya PA-C

## 2023-10-24 NOTE — ED TRIAGE NOTES
To ER via EMS from and fell down approx 6-8 steps.  Pt c/o pain to left distal femur.  Left leg is shortened and externally rotated.  Pt has hx of left knee replacement.

## 2023-10-24 NOTE — ED PROVIDER NOTES
EMERGENCY DEPARTMENT ENCOUNTER    Room Number:  10/10  PCP: Provider, No Known  Patient Care Team:  Provider, No Known as PCP - General   Independent Historians: Patient, EMS    HPI:  Chief Complaint: Left leg pain    A complete HPI/ROS/PMH/PSH/SH/FH are unobtainable due to: Nothing    Chronic or social conditions impacting patient care (Social Determinants of Health): None  (Financial Resource Strain / Food Insecurity / Transportation Needs / Physical Activity / Stress / Social Connections / Intimate Partner Violence / Housing Stability)    Context: Sasha Knapp is a 58 y.o. female who presents to the ED c/o acute left leg pain.  The patient reports that she was going down the stairs tonight and she lost her balance and fell.  She denies hitting her head.  She reports pain to her left leg.  She denies any pain to her neck or her back.  She denies loss of consciousness.  She is not on blood thinners.  She reports he has had bilateral knee replacements by Dr. Cueto in the past.  She reports severe pain.  She denies syncope.    Review of prior external notes (non-ED) -and- Review of prior external test results outside of this encounter: Laboratory evaluation 10/5/2018 shows normal CMP except for an elevated blood glucose of 346.    Prescription drug monitoring program review:         PAST MEDICAL HISTORY  Active Ambulatory Problems     Diagnosis Date Noted    No Active Ambulatory Problems     Resolved Ambulatory Problems     Diagnosis Date Noted    No Resolved Ambulatory Problems     No Additional Past Medical History         PAST SURGICAL HISTORY  No past surgical history on file.      FAMILY HISTORY  No family history on file.      SOCIAL HISTORY  Social History     Socioeconomic History    Marital status: Single         ALLERGIES  Patient has no known allergies.        REVIEW OF SYSTEMS  Review of Systems  Included in HPI  All systems reviewed and negative except for those discussed in HPI.      PHYSICAL  EXAM    I have reviewed the triage vital signs and nursing notes.    ED Triage Vitals [10/24/23 0554]   Temp Heart Rate Resp BP SpO2   97.6 °F (36.4 °C) 78 24 171/99 98 %      Temp src Heart Rate Source Patient Position BP Location FiO2 (%)   Oral -- -- -- --       Physical Exam  GENERAL: Awake, alert, pain distress  SKIN: Warm, dry  HENT: Normocephalic, atraumatic  EYES: no scleral icterus  CV: regular rhythm, regular rate  RESPIRATORY: normal effort, lungs clear  ABDOMEN: soft, nontender, nondistended  MUSCULOSKELETAL: no deformity.  No tenderness to the right lower extremity or bilateral upper extremities.  She has tenderness throughout her left thigh primarily at her left distal thigh.  She has no open wounds.  She has intact pedal pulses.  Normal sensation and motor.  No tenderness to the cervical spine.  NEURO: alert, moves all extremities, follows commands                                                               LAB RESULTS  Recent Results (from the past 24 hour(s))   ECG 12 Lead Other; fall    Collection Time: 10/24/23  6:14 AM   Result Value Ref Range    QT Interval 392 ms    QTC Interval 441 ms   CBC Auto Differential    Collection Time: 10/24/23  6:26 AM    Specimen: Blood   Result Value Ref Range    WBC 6.97 3.40 - 10.80 10*3/mm3    RBC 4.38 3.77 - 5.28 10*6/mm3    Hemoglobin 13.4 12.0 - 15.9 g/dL    Hematocrit 41.0 34.0 - 46.6 %    MCV 93.6 79.0 - 97.0 fL    MCH 30.6 26.6 - 33.0 pg    MCHC 32.7 31.5 - 35.7 g/dL    RDW 13.1 12.3 - 15.4 %    RDW-SD 45.5 37.0 - 54.0 fl    MPV 11.5 6.0 - 12.0 fL    Platelets 232 140 - 450 10*3/mm3    Neutrophil % 67.6 42.7 - 76.0 %    Lymphocyte % 21.4 19.6 - 45.3 %    Monocyte % 5.9 5.0 - 12.0 %    Eosinophil % 4.3 0.3 - 6.2 %    Basophil % 0.4 0.0 - 1.5 %    Immature Grans % 0.4 0.0 - 0.5 %    Neutrophils, Absolute 4.71 1.70 - 7.00 10*3/mm3    Lymphocytes, Absolute 1.49 0.70 - 3.10 10*3/mm3    Monocytes, Absolute 0.41 0.10 - 0.90 10*3/mm3    Eosinophils, Absolute  0.30 0.00 - 0.40 10*3/mm3    Basophils, Absolute 0.03 0.00 - 0.20 10*3/mm3    Immature Grans, Absolute 0.03 0.00 - 0.05 10*3/mm3    nRBC 0.0 0.0 - 0.2 /100 WBC       Ordered the above labs and independently reviewed the results.        RADIOLOGY  XR Chest 1 View, XR Hip With or Without Pelvis 2 - 3 View Left, XR Femur 2 View Left    Result Date: 10/24/2023  XR CHEST 1 VW-, XR FEMUR 2 VW LEFT-, XR HIP W OR WO PELVIS 2-3 VIEW LEFT-  HISTORY: Female who is 58 years-old, trauma  TECHNIQUE: Frontal view of the chest, 3 views of the left femur, frontal view of the pelvis, 2 views of the left hip  COMPARISON: None available  FINDINGS:  Chest x-ray: Heart, mediastinum and pulmonary vasculature are unremarkable. No focal pulmonary consolidation, pleural effusion, or pneumothorax. No acute osseous process.  Pelvis, left hip, left femur: A comminuted, angulated, displaced fracture involving the distal left femoral metaphysis is noted, just above the left knee arthroplasty hardware. The main distal fracture fragment is posteriorly displaced by about 1 shaft width. No other fractures are identified. No dislocation. Hip joint spaces appear preserved.      As described.  This report was finalized on 10/24/2023 7:01 AM by Dr. Irwin Skinner M.D on Workstation: [id number]       I ordered the above noted radiological studies. Reviewed by me and discussed with radiologist.  See dictation for official radiology interpretation.      PROCEDURES    Procedures      MEDICATIONS GIVEN IN ER    Medications   morphine injection 2 mg (has no administration in time range)   ondansetron (ZOFRAN) injection 8 mg (has no administration in time range)   ethyl alcohol 62 % 2 each (has no administration in time range)   HYDROmorphone (DILAUDID) injection 0.5 mg (has no administration in time range)   HYDROcodone-acetaminophen (NORCO)  MG per tablet 1 tablet (has no administration in time range)     Or   HYDROcodone-acetaminophen (NORCO)   MG per tablet 2 tablet (has no administration in time range)   morphine injection 4 mg (4 mg Intravenous Given 10/24/23 0624)   HYDROmorphone (DILAUDID) injection 0.5 mg (0.5 mg Intravenous Given 10/24/23 0724)         ORDERS PLACED DURING THIS VISIT:  Orders Placed This Encounter   Procedures    XR Chest 1 View    XR Hip With or Without Pelvis 2 - 3 View Left    XR Femur 2 View Left    CT Lower Extremity Left Without Contrast    Comprehensive Metabolic Panel    CBC Auto Differential    Diet: Regular/House Diet; Texture: Regular Texture (IDDSI 7); Fluid Consistency: Thin (IDDSI 0)    NPO Diet NPO Type: Strict NPO    Measure Blood Pressure    Continuous Pulse Oximetry    Discontinue Cardiac Monitoring    Verify Informed Consent    Skin Care    Ortho (on-call MD unless specified)    LHA (on-call MD unless specified) Details    ECG 12 Lead Other; fall    Inpatient Admission    CBC & Differential         PROGRESS, DATA ANALYSIS, CONSULTS, AND MEDICAL DECISION MAKING    All labs have been independently interpreted by me.  All radiology studies have been reviewed by me and discussed with radiologist dictating the report.   EKG's independently viewed and interpreted by me.  Discussion below represents my analysis of pertinent findings related to patient's condition, differential diagnosis, treatment plan and final disposition.    Differential diagnosis includes but is not limited to hip fracture, knee fracture, femur fracture, hip dislocation, knee dislocation, intracranial hemorrhage, spine fracture.    ED Course as of 10/24/23 0804   Tue Oct 24, 2023   0709 XR Femur 2 View Left  My independent interpretation of the left femur x-ray is distal femur fracture [TR]   0717 I reviewed the work-up and findings with the patient at the bedside.  Answered all questions.  She has a distal left femur fracture.  She has no other signs of trauma.  She is still under significant pain to have added more pain medicine.  Plan admission to  the hospital for further management.  She is agreeable. [TR]   0717 EKG          EKG time: 614  Rhythm/Rate: Normal sinus, rate 76  P waves and LA: Normal P, Normal LA  QRS, axis: Narrow QRS, Normal axis  ST and T waves: No acute changes    Independently Interpreted by me  No prior EKG available for comparison [TR]   0734 Discussing with Dr. Beach with A.  He agrees to admit. [TR]      ED Course User Index  [TR] Leonides Sutton MD           AS OF 08:04 EDT VITALS:    BP - (!) 208/106  HR - 85  TEMP - 97.6 °F (36.4 °C) (Oral)  O2 SATS - 98%        DIAGNOSIS  Final diagnoses:   Closed displaced comminuted fracture of shaft of left femur, initial encounter         DISPOSITION  ED Disposition       ED Disposition   Decision to Admit    Condition   --    Comment   Level of Care: Med/Surg [1]   Diagnosis: Closed displaced comminuted fracture of shaft of left femur [994870]   Admitting Physician: FIOR BEACH [[id number]]   Attending Physician: FIOR BEACH [[id number]]   Certification: I Certify That Inpatient Hospital Services Are Medically Necessary For Greater Than 2 Midnights                    Note Disclaimer: At [facility], we believe that sharing information builds trust and better relationships. You are receiving this note because you recently visited [facility]. It is possible you will see health information before a provider has talked with you about it. This kind of information can be easy to misunderstand. To help you fully understand what it means for your health, we urge you to discuss this note with your provider.         Leonides Sutton MD  10/24/23 0804

## 2023-10-25 ENCOUNTER — ANESTHESIA (OUTPATIENT)
Dept: PERIOP | Facility: HOSPITAL | Age: 58
End: 2023-10-25
Payer: COMMERCIAL

## 2023-10-25 ENCOUNTER — ANESTHESIA EVENT (OUTPATIENT)
Dept: PERIOP | Facility: HOSPITAL | Age: 58
End: 2023-10-25
Payer: COMMERCIAL

## 2023-10-25 ENCOUNTER — APPOINTMENT (OUTPATIENT)
Dept: GENERAL RADIOLOGY | Facility: HOSPITAL | Age: 58
DRG: 481 | End: 2023-10-25
Payer: COMMERCIAL

## 2023-10-25 LAB
ANION GAP SERPL CALCULATED.3IONS-SCNC: 6.1 MMOL/L (ref 5–15)
BUN SERPL-MCNC: 33 MG/DL (ref 6–20)
BUN/CREAT SERPL: 24.6 (ref 7–25)
CALCIUM SPEC-SCNC: 8.7 MG/DL (ref 8.6–10.5)
CHLORIDE SERPL-SCNC: 105 MMOL/L (ref 98–107)
CO2 SERPL-SCNC: 21.9 MMOL/L (ref 22–29)
CREAT SERPL-MCNC: 1.34 MG/DL (ref 0.57–1)
DEPRECATED RDW RBC AUTO: 43.7 FL (ref 37–54)
EGFRCR SERPLBLD CKD-EPI 2021: 46.1 ML/MIN/1.73
ERYTHROCYTE [DISTWIDTH] IN BLOOD BY AUTOMATED COUNT: 12.9 % (ref 12.3–15.4)
GLUCOSE BLDC GLUCOMTR-MCNC: 132 MG/DL (ref 70–130)
GLUCOSE BLDC GLUCOMTR-MCNC: 197 MG/DL (ref 70–130)
GLUCOSE BLDC GLUCOMTR-MCNC: 206 MG/DL (ref 70–130)
GLUCOSE BLDC GLUCOMTR-MCNC: 379 MG/DL (ref 70–130)
GLUCOSE SERPL-MCNC: 191 MG/DL (ref 65–99)
HCT VFR BLD AUTO: 33.6 % (ref 34–46.6)
HCT VFR BLD AUTO: 38 % (ref 34–46.6)
HGB BLD-MCNC: 10.9 G/DL (ref 12–15.9)
HGB BLD-MCNC: 12.2 G/DL (ref 12–15.9)
MCH RBC QN AUTO: 29.9 PG (ref 26.6–33)
MCHC RBC AUTO-ENTMCNC: 32.4 G/DL (ref 31.5–35.7)
MCV RBC AUTO: 92.3 FL (ref 79–97)
PLATELET # BLD AUTO: 229 10*3/MM3 (ref 140–450)
PMV BLD AUTO: 11 FL (ref 6–12)
POTASSIUM SERPL-SCNC: 4.4 MMOL/L (ref 3.5–5.2)
RBC # BLD AUTO: 3.64 10*6/MM3 (ref 3.77–5.28)
SODIUM SERPL-SCNC: 133 MMOL/L (ref 136–145)
WBC NRBC COR # BLD: 6.97 10*3/MM3 (ref 3.4–10.8)

## 2023-10-25 PROCEDURE — 85018 HEMOGLOBIN: CPT | Performed by: ORTHOPAEDIC SURGERY

## 2023-10-25 PROCEDURE — 25010000002 ONDANSETRON PER 1 MG: Performed by: REGISTERED NURSE

## 2023-10-25 PROCEDURE — 85014 HEMATOCRIT: CPT | Performed by: ORTHOPAEDIC SURGERY

## 2023-10-25 PROCEDURE — C1713 ANCHOR/SCREW BN/BN,TIS/BN: HCPCS | Performed by: ORTHOPAEDIC SURGERY

## 2023-10-25 PROCEDURE — 25010000002 FENTANYL CITRATE (PF) 50 MCG/ML SOLUTION: Performed by: ANESTHESIOLOGY

## 2023-10-25 PROCEDURE — 80048 BASIC METABOLIC PNL TOTAL CA: CPT | Performed by: STUDENT IN AN ORGANIZED HEALTH CARE EDUCATION/TRAINING PROGRAM

## 2023-10-25 PROCEDURE — 25010000002 SUGAMMADEX 200 MG/2ML SOLUTION: Performed by: NURSE ANESTHETIST, CERTIFIED REGISTERED

## 2023-10-25 PROCEDURE — 25010000002 CEFAZOLIN IN DEXTROSE 2-4 GM/100ML-% SOLUTION: Performed by: ORTHOPAEDIC SURGERY

## 2023-10-25 PROCEDURE — 25810000003 LACTATED RINGERS PER 1000 ML: Performed by: ANESTHESIOLOGY

## 2023-10-25 PROCEDURE — 25010000002 PROPOFOL 10 MG/ML EMULSION: Performed by: REGISTERED NURSE

## 2023-10-25 PROCEDURE — 25010000002 CEFAZOLIN IN DEXTROSE 2-4 GM/100ML-% SOLUTION: Performed by: REGISTERED NURSE

## 2023-10-25 PROCEDURE — 25810000003 SODIUM CHLORIDE 0.9 % SOLUTION: Performed by: STUDENT IN AN ORGANIZED HEALTH CARE EDUCATION/TRAINING PROGRAM

## 2023-10-25 PROCEDURE — 85027 COMPLETE CBC AUTOMATED: CPT | Performed by: STUDENT IN AN ORGANIZED HEALTH CARE EDUCATION/TRAINING PROGRAM

## 2023-10-25 PROCEDURE — 73552 X-RAY EXAM OF FEMUR 2/>: CPT

## 2023-10-25 PROCEDURE — 82948 REAGENT STRIP/BLOOD GLUCOSE: CPT

## 2023-10-25 PROCEDURE — 76000 FLUOROSCOPY <1 HR PHYS/QHP: CPT

## 2023-10-25 PROCEDURE — 25010000002 HYDROMORPHONE PER 4 MG: Performed by: REGISTERED NURSE

## 2023-10-25 PROCEDURE — 36415 COLL VENOUS BLD VENIPUNCTURE: CPT | Performed by: STUDENT IN AN ORGANIZED HEALTH CARE EDUCATION/TRAINING PROGRAM

## 2023-10-25 PROCEDURE — 0QSC36Z REPOSITION LEFT LOWER FEMUR WITH INTRAMEDULLARY INTERNAL FIXATION DEVICE, PERCUTANEOUS APPROACH: ICD-10-PCS | Performed by: ORTHOPAEDIC SURGERY

## 2023-10-25 PROCEDURE — 63710000001 INSULIN LISPRO (HUMAN) PER 5 UNITS: Performed by: ORTHOPAEDIC SURGERY

## 2023-10-25 PROCEDURE — 25010000002 HYDROMORPHONE PER 4 MG: Performed by: ORTHOPAEDIC SURGERY

## 2023-10-25 PROCEDURE — C1769 GUIDE WIRE: HCPCS | Performed by: ORTHOPAEDIC SURGERY

## 2023-10-25 PROCEDURE — 25010000002 FENTANYL CITRATE (PF) 50 MCG/ML SOLUTION: Performed by: REGISTERED NURSE

## 2023-10-25 PROCEDURE — 25010000002 DEXAMETHASONE SODIUM PHOSPHATE 20 MG/5ML SOLUTION: Performed by: REGISTERED NURSE

## 2023-10-25 PROCEDURE — 63710000001 INSULIN LISPRO (HUMAN) PER 5 UNITS: Performed by: STUDENT IN AN ORGANIZED HEALTH CARE EDUCATION/TRAINING PROGRAM

## 2023-10-25 RX ORDER — FENTANYL CITRATE 50 UG/ML
50 INJECTION, SOLUTION INTRAMUSCULAR; INTRAVENOUS ONCE AS NEEDED
Status: COMPLETED | OUTPATIENT
Start: 2023-10-25 | End: 2023-10-25

## 2023-10-25 RX ORDER — SODIUM CHLORIDE 9 MG/ML
100 INJECTION, SOLUTION INTRAVENOUS CONTINUOUS
Status: DISCONTINUED | OUTPATIENT
Start: 2023-10-25 | End: 2023-10-28 | Stop reason: HOSPADM

## 2023-10-25 RX ORDER — PROMETHAZINE HYDROCHLORIDE 25 MG/1
25 SUPPOSITORY RECTAL ONCE AS NEEDED
Status: DISCONTINUED | OUTPATIENT
Start: 2023-10-25 | End: 2023-10-25 | Stop reason: HOSPADM

## 2023-10-25 RX ORDER — IPRATROPIUM BROMIDE AND ALBUTEROL SULFATE 2.5; .5 MG/3ML; MG/3ML
3 SOLUTION RESPIRATORY (INHALATION) ONCE AS NEEDED
Status: DISCONTINUED | OUTPATIENT
Start: 2023-10-25 | End: 2023-10-25 | Stop reason: HOSPADM

## 2023-10-25 RX ORDER — FLUMAZENIL 0.1 MG/ML
0.2 INJECTION INTRAVENOUS AS NEEDED
Status: DISCONTINUED | OUTPATIENT
Start: 2023-10-25 | End: 2023-10-25 | Stop reason: HOSPADM

## 2023-10-25 RX ORDER — SODIUM CHLORIDE, SODIUM LACTATE, POTASSIUM CHLORIDE, CALCIUM CHLORIDE 600; 310; 30; 20 MG/100ML; MG/100ML; MG/100ML; MG/100ML
9 INJECTION, SOLUTION INTRAVENOUS CONTINUOUS
Status: DISCONTINUED | OUTPATIENT
Start: 2023-10-25 | End: 2023-10-25

## 2023-10-25 RX ORDER — SODIUM CHLORIDE 0.9 % (FLUSH) 0.9 %
3 SYRINGE (ML) INJECTION EVERY 12 HOURS SCHEDULED
Status: DISCONTINUED | OUTPATIENT
Start: 2023-10-25 | End: 2023-10-25 | Stop reason: HOSPADM

## 2023-10-25 RX ORDER — ONDANSETRON 2 MG/ML
4 INJECTION INTRAMUSCULAR; INTRAVENOUS EVERY 6 HOURS PRN
Status: DISCONTINUED | OUTPATIENT
Start: 2023-10-25 | End: 2023-10-28 | Stop reason: HOSPADM

## 2023-10-25 RX ORDER — FENTANYL CITRATE 50 UG/ML
50 INJECTION, SOLUTION INTRAMUSCULAR; INTRAVENOUS
Status: DISCONTINUED | OUTPATIENT
Start: 2023-10-25 | End: 2023-10-25 | Stop reason: HOSPADM

## 2023-10-25 RX ORDER — HYDROCODONE BITARTRATE AND ACETAMINOPHEN 10; 325 MG/1; MG/1
2 TABLET ORAL EVERY 4 HOURS PRN
Status: DISCONTINUED | OUTPATIENT
Start: 2023-10-25 | End: 2023-10-28 | Stop reason: HOSPADM

## 2023-10-25 RX ORDER — DROPERIDOL 2.5 MG/ML
0.62 INJECTION, SOLUTION INTRAMUSCULAR; INTRAVENOUS
Status: DISCONTINUED | OUTPATIENT
Start: 2023-10-25 | End: 2023-10-25 | Stop reason: HOSPADM

## 2023-10-25 RX ORDER — FENTANYL CITRATE 50 UG/ML
INJECTION, SOLUTION INTRAMUSCULAR; INTRAVENOUS AS NEEDED
Status: DISCONTINUED | OUTPATIENT
Start: 2023-10-25 | End: 2023-10-25 | Stop reason: SURG

## 2023-10-25 RX ORDER — SODIUM CHLORIDE 0.9 % (FLUSH) 0.9 %
3-10 SYRINGE (ML) INJECTION AS NEEDED
Status: DISCONTINUED | OUTPATIENT
Start: 2023-10-25 | End: 2023-10-25 | Stop reason: HOSPADM

## 2023-10-25 RX ORDER — FAMOTIDINE 20 MG/1
40 TABLET, FILM COATED ORAL DAILY
Status: DISCONTINUED | OUTPATIENT
Start: 2023-10-25 | End: 2023-10-28 | Stop reason: HOSPADM

## 2023-10-25 RX ORDER — HYDROCODONE BITARTRATE AND ACETAMINOPHEN 10; 325 MG/1; MG/1
1 TABLET ORAL EVERY 4 HOURS PRN
Status: DISCONTINUED | OUTPATIENT
Start: 2023-10-25 | End: 2023-10-28 | Stop reason: HOSPADM

## 2023-10-25 RX ORDER — PROMETHAZINE HYDROCHLORIDE 25 MG/1
25 TABLET ORAL ONCE AS NEEDED
Status: DISCONTINUED | OUTPATIENT
Start: 2023-10-25 | End: 2023-10-25 | Stop reason: HOSPADM

## 2023-10-25 RX ORDER — DEXAMETHASONE SODIUM PHOSPHATE 4 MG/ML
INJECTION, SOLUTION INTRA-ARTICULAR; INTRALESIONAL; INTRAMUSCULAR; INTRAVENOUS; SOFT TISSUE AS NEEDED
Status: DISCONTINUED | OUTPATIENT
Start: 2023-10-25 | End: 2023-10-25 | Stop reason: SURG

## 2023-10-25 RX ORDER — NALOXONE HCL 0.4 MG/ML
0.2 VIAL (ML) INJECTION AS NEEDED
Status: DISCONTINUED | OUTPATIENT
Start: 2023-10-25 | End: 2023-10-25 | Stop reason: HOSPADM

## 2023-10-25 RX ORDER — HYDROCODONE BITARTRATE AND ACETAMINOPHEN 5; 325 MG/1; MG/1
1 TABLET ORAL ONCE AS NEEDED
Status: DISCONTINUED | OUTPATIENT
Start: 2023-10-25 | End: 2023-10-25 | Stop reason: HOSPADM

## 2023-10-25 RX ORDER — SODIUM CHLORIDE, SODIUM LACTATE, POTASSIUM CHLORIDE, CALCIUM CHLORIDE 600; 310; 30; 20 MG/100ML; MG/100ML; MG/100ML; MG/100ML
100 INJECTION, SOLUTION INTRAVENOUS CONTINUOUS
Status: DISCONTINUED | OUTPATIENT
Start: 2023-10-25 | End: 2023-10-25

## 2023-10-25 RX ORDER — ONDANSETRON 2 MG/ML
INJECTION INTRAMUSCULAR; INTRAVENOUS AS NEEDED
Status: DISCONTINUED | OUTPATIENT
Start: 2023-10-25 | End: 2023-10-25 | Stop reason: SURG

## 2023-10-25 RX ORDER — UREA 10 %
1 LOTION (ML) TOPICAL NIGHTLY PRN
Status: DISCONTINUED | OUTPATIENT
Start: 2023-10-25 | End: 2023-10-25 | Stop reason: SDUPTHER

## 2023-10-25 RX ORDER — LIDOCAINE HYDROCHLORIDE 10 MG/ML
0.5 INJECTION, SOLUTION INFILTRATION; PERINEURAL ONCE AS NEEDED
Status: DISCONTINUED | OUTPATIENT
Start: 2023-10-25 | End: 2023-10-25 | Stop reason: HOSPADM

## 2023-10-25 RX ORDER — AMOXICILLIN 250 MG
2 CAPSULE ORAL 2 TIMES DAILY
Status: DISCONTINUED | OUTPATIENT
Start: 2023-10-25 | End: 2023-10-25 | Stop reason: SDUPTHER

## 2023-10-25 RX ORDER — CEFAZOLIN SODIUM 2 G/100ML
INJECTION, SOLUTION INTRAVENOUS AS NEEDED
Status: DISCONTINUED | OUTPATIENT
Start: 2023-10-25 | End: 2023-10-25 | Stop reason: SURG

## 2023-10-25 RX ORDER — ONDANSETRON 2 MG/ML
4 INJECTION INTRAMUSCULAR; INTRAVENOUS ONCE AS NEEDED
Status: COMPLETED | OUTPATIENT
Start: 2023-10-25 | End: 2023-10-25

## 2023-10-25 RX ORDER — HYDROMORPHONE HYDROCHLORIDE 2 MG/ML
INJECTION, SOLUTION INTRAMUSCULAR; INTRAVENOUS; SUBCUTANEOUS AS NEEDED
Status: DISCONTINUED | OUTPATIENT
Start: 2023-10-25 | End: 2023-10-25 | Stop reason: SURG

## 2023-10-25 RX ORDER — PROPOFOL 10 MG/ML
VIAL (ML) INTRAVENOUS AS NEEDED
Status: DISCONTINUED | OUTPATIENT
Start: 2023-10-25 | End: 2023-10-25 | Stop reason: SURG

## 2023-10-25 RX ORDER — DIPHENHYDRAMINE HYDROCHLORIDE 50 MG/ML
12.5 INJECTION INTRAMUSCULAR; INTRAVENOUS
Status: DISCONTINUED | OUTPATIENT
Start: 2023-10-25 | End: 2023-10-25 | Stop reason: HOSPADM

## 2023-10-25 RX ORDER — ASPIRIN 81 MG/1
81 TABLET ORAL EVERY 12 HOURS SCHEDULED
Status: DISCONTINUED | OUTPATIENT
Start: 2023-10-26 | End: 2023-10-28 | Stop reason: HOSPADM

## 2023-10-25 RX ORDER — LIDOCAINE HYDROCHLORIDE 20 MG/ML
INJECTION, SOLUTION INFILTRATION; PERINEURAL AS NEEDED
Status: DISCONTINUED | OUTPATIENT
Start: 2023-10-25 | End: 2023-10-25 | Stop reason: SURG

## 2023-10-25 RX ORDER — ACETAMINOPHEN 325 MG/1
325 TABLET ORAL EVERY 4 HOURS PRN
Status: DISCONTINUED | OUTPATIENT
Start: 2023-10-25 | End: 2023-10-28 | Stop reason: HOSPADM

## 2023-10-25 RX ORDER — HYDROMORPHONE HYDROCHLORIDE 1 MG/ML
0.5 INJECTION, SOLUTION INTRAMUSCULAR; INTRAVENOUS; SUBCUTANEOUS
Status: DISCONTINUED | OUTPATIENT
Start: 2023-10-25 | End: 2023-10-25 | Stop reason: HOSPADM

## 2023-10-25 RX ORDER — ONDANSETRON 4 MG/1
4 TABLET, FILM COATED ORAL EVERY 6 HOURS PRN
Status: DISCONTINUED | OUTPATIENT
Start: 2023-10-25 | End: 2023-10-28 | Stop reason: HOSPADM

## 2023-10-25 RX ORDER — EPHEDRINE SULFATE 50 MG/ML
5 INJECTION, SOLUTION INTRAVENOUS ONCE AS NEEDED
Status: DISCONTINUED | OUTPATIENT
Start: 2023-10-25 | End: 2023-10-25 | Stop reason: HOSPADM

## 2023-10-25 RX ORDER — OXYCODONE AND ACETAMINOPHEN 7.5; 325 MG/1; MG/1
1 TABLET ORAL EVERY 4 HOURS PRN
Status: DISCONTINUED | OUTPATIENT
Start: 2023-10-25 | End: 2023-10-25 | Stop reason: HOSPADM

## 2023-10-25 RX ORDER — NALOXONE HCL 0.4 MG/ML
0.1 VIAL (ML) INJECTION
Status: DISCONTINUED | OUTPATIENT
Start: 2023-10-25 | End: 2023-10-28 | Stop reason: HOSPADM

## 2023-10-25 RX ORDER — MIDAZOLAM HYDROCHLORIDE 1 MG/ML
1 INJECTION INTRAMUSCULAR; INTRAVENOUS
Status: DISCONTINUED | OUTPATIENT
Start: 2023-10-25 | End: 2023-10-25 | Stop reason: HOSPADM

## 2023-10-25 RX ORDER — SODIUM CHLORIDE 0.9 % (FLUSH) 0.9 %
1-10 SYRINGE (ML) INJECTION AS NEEDED
Status: DISCONTINUED | OUTPATIENT
Start: 2023-10-25 | End: 2023-10-28 | Stop reason: HOSPADM

## 2023-10-25 RX ORDER — MAGNESIUM HYDROXIDE 1200 MG/15ML
LIQUID ORAL AS NEEDED
Status: DISCONTINUED | OUTPATIENT
Start: 2023-10-25 | End: 2023-10-25 | Stop reason: HOSPADM

## 2023-10-25 RX ORDER — LABETALOL HYDROCHLORIDE 5 MG/ML
5 INJECTION, SOLUTION INTRAVENOUS
Status: DISCONTINUED | OUTPATIENT
Start: 2023-10-25 | End: 2023-10-25 | Stop reason: HOSPADM

## 2023-10-25 RX ORDER — FAMOTIDINE 10 MG/ML
20 INJECTION, SOLUTION INTRAVENOUS ONCE
Status: COMPLETED | OUTPATIENT
Start: 2023-10-25 | End: 2023-10-25

## 2023-10-25 RX ORDER — SODIUM CHLORIDE 0.9 % (FLUSH) 0.9 %
10 SYRINGE (ML) INJECTION EVERY 12 HOURS SCHEDULED
Status: DISCONTINUED | OUTPATIENT
Start: 2023-10-25 | End: 2023-10-28 | Stop reason: HOSPADM

## 2023-10-25 RX ORDER — HYDRALAZINE HYDROCHLORIDE 20 MG/ML
5 INJECTION INTRAMUSCULAR; INTRAVENOUS
Status: DISCONTINUED | OUTPATIENT
Start: 2023-10-25 | End: 2023-10-25 | Stop reason: HOSPADM

## 2023-10-25 RX ORDER — SODIUM CHLORIDE 9 MG/ML
40 INJECTION, SOLUTION INTRAVENOUS AS NEEDED
Status: DISCONTINUED | OUTPATIENT
Start: 2023-10-25 | End: 2023-10-28 | Stop reason: HOSPADM

## 2023-10-25 RX ORDER — ROCURONIUM BROMIDE 10 MG/ML
INJECTION, SOLUTION INTRAVENOUS AS NEEDED
Status: DISCONTINUED | OUTPATIENT
Start: 2023-10-25 | End: 2023-10-25 | Stop reason: SURG

## 2023-10-25 RX ORDER — HYDROMORPHONE HYDROCHLORIDE 1 MG/ML
0.5 INJECTION, SOLUTION INTRAMUSCULAR; INTRAVENOUS; SUBCUTANEOUS
Status: DISCONTINUED | OUTPATIENT
Start: 2023-10-25 | End: 2023-10-27

## 2023-10-25 RX ORDER — CEFAZOLIN SODIUM 2 G/100ML
2000 INJECTION, SOLUTION INTRAVENOUS EVERY 8 HOURS
Qty: 200 ML | Refills: 0 | Status: COMPLETED | OUTPATIENT
Start: 2023-10-25 | End: 2023-10-26

## 2023-10-25 RX ADMIN — HYDROMORPHONE HYDROCHLORIDE 0.5 MG: 1 INJECTION, SOLUTION INTRAMUSCULAR; INTRAVENOUS; SUBCUTANEOUS at 05:31

## 2023-10-25 RX ADMIN — CEFAZOLIN SODIUM 2000 MG: 2 INJECTION, SOLUTION INTRAVENOUS at 20:31

## 2023-10-25 RX ADMIN — DOCUSATE SODIUM 50MG AND SENNOSIDES 8.6MG 2 TABLET: 8.6; 5 TABLET, FILM COATED ORAL at 22:09

## 2023-10-25 RX ADMIN — FAMOTIDINE 20 MG: 10 INJECTION INTRAVENOUS at 12:37

## 2023-10-25 RX ADMIN — ONDANSETRON 4 MG: 2 INJECTION INTRAMUSCULAR; INTRAVENOUS at 16:43

## 2023-10-25 RX ADMIN — PROPOFOL 200 MG: 10 INJECTION, EMULSION INTRAVENOUS at 12:57

## 2023-10-25 RX ADMIN — HYDROMORPHONE HYDROCHLORIDE 0.5 MG: 1 INJECTION, SOLUTION INTRAMUSCULAR; INTRAVENOUS; SUBCUTANEOUS at 23:29

## 2023-10-25 RX ADMIN — HYDROCODONE BITARTRATE AND ACETAMINOPHEN 2 TABLET: 10; 325 TABLET ORAL at 01:47

## 2023-10-25 RX ADMIN — Medication 10 ML: at 08:15

## 2023-10-25 RX ADMIN — CEFAZOLIN SODIUM 2000 MG: 2 INJECTION, SOLUTION INTRAVENOUS at 01:16

## 2023-10-25 RX ADMIN — HYDROCODONE BITARTRATE AND ACETAMINOPHEN 1 TABLET: 10; 325 TABLET ORAL at 20:48

## 2023-10-25 RX ADMIN — ONDANSETRON 4 MG: 2 INJECTION INTRAMUSCULAR; INTRAVENOUS at 13:26

## 2023-10-25 RX ADMIN — SUGAMMADEX 200 MG: 100 INJECTION, SOLUTION INTRAVENOUS at 15:11

## 2023-10-25 RX ADMIN — DEXAMETHASONE SODIUM PHOSPHATE 8 MG: 4 INJECTION, SOLUTION INTRAMUSCULAR; INTRAVENOUS at 13:26

## 2023-10-25 RX ADMIN — INSULIN LISPRO 6 UNITS: 100 INJECTION, SOLUTION INTRAVENOUS; SUBCUTANEOUS at 22:09

## 2023-10-25 RX ADMIN — FENTANYL CITRATE 50 MCG: 50 INJECTION, SOLUTION INTRAMUSCULAR; INTRAVENOUS at 12:38

## 2023-10-25 RX ADMIN — FENTANYL CITRATE 50 MCG: 50 INJECTION, SOLUTION INTRAMUSCULAR; INTRAVENOUS at 15:55

## 2023-10-25 RX ADMIN — HYDROCODONE BITARTRATE AND ACETAMINOPHEN 2 TABLET: 10; 325 TABLET ORAL at 06:58

## 2023-10-25 RX ADMIN — HYDROMORPHONE HYDROCHLORIDE 0.5 MG: 1 INJECTION, SOLUTION INTRAMUSCULAR; INTRAVENOUS; SUBCUTANEOUS at 17:11

## 2023-10-25 RX ADMIN — HYDROMORPHONE HYDROCHLORIDE 0.5 MG: 1 INJECTION, SOLUTION INTRAMUSCULAR; INTRAVENOUS; SUBCUTANEOUS at 08:19

## 2023-10-25 RX ADMIN — HYDROMORPHONE HYDROCHLORIDE 0.5 MG: 2 INJECTION, SOLUTION INTRAMUSCULAR; INTRAVENOUS; SUBCUTANEOUS at 13:55

## 2023-10-25 RX ADMIN — HYDROMORPHONE HYDROCHLORIDE 0.5 MG: 2 INJECTION, SOLUTION INTRAMUSCULAR; INTRAVENOUS; SUBCUTANEOUS at 13:26

## 2023-10-25 RX ADMIN — HYDROMORPHONE HYDROCHLORIDE 0.5 MG: 1 INJECTION, SOLUTION INTRAMUSCULAR; INTRAVENOUS; SUBCUTANEOUS at 16:24

## 2023-10-25 RX ADMIN — Medication 10 ML: at 20:34

## 2023-10-25 RX ADMIN — FENTANYL CITRATE 50 MCG: 50 INJECTION, SOLUTION INTRAMUSCULAR; INTRAVENOUS at 12:57

## 2023-10-25 RX ADMIN — HYDROMORPHONE HYDROCHLORIDE 0.5 MG: 1 INJECTION, SOLUTION INTRAMUSCULAR; INTRAVENOUS; SUBCUTANEOUS at 16:48

## 2023-10-25 RX ADMIN — SODIUM CHLORIDE, POTASSIUM CHLORIDE, SODIUM LACTATE AND CALCIUM CHLORIDE 9 ML/HR: 600; 310; 30; 20 INJECTION, SOLUTION INTRAVENOUS at 12:43

## 2023-10-25 RX ADMIN — SODIUM CHLORIDE 100 ML/HR: 9 INJECTION, SOLUTION INTRAVENOUS at 08:11

## 2023-10-25 RX ADMIN — SODIUM CHLORIDE, POTASSIUM CHLORIDE, SODIUM LACTATE AND CALCIUM CHLORIDE: 600; 310; 30; 20 INJECTION, SOLUTION INTRAVENOUS at 15:06

## 2023-10-25 RX ADMIN — LIDOCAINE HYDROCHLORIDE 60 MG: 20 INJECTION, SOLUTION INFILTRATION; PERINEURAL at 12:57

## 2023-10-25 RX ADMIN — FAMOTIDINE 40 MG: 20 TABLET, FILM COATED ORAL at 20:27

## 2023-10-25 RX ADMIN — INSULIN LISPRO 3 UNITS: 100 INJECTION, SOLUTION INTRAVENOUS; SUBCUTANEOUS at 08:10

## 2023-10-25 RX ADMIN — HYDROMORPHONE HYDROCHLORIDE 0.5 MG: 1 INJECTION, SOLUTION INTRAMUSCULAR; INTRAVENOUS; SUBCUTANEOUS at 10:24

## 2023-10-25 RX ADMIN — CEFAZOLIN SODIUM 2 G: 2 INJECTION, SOLUTION INTRAVENOUS at 13:03

## 2023-10-25 RX ADMIN — ROCURONIUM BROMIDE 50 MG: 10 INJECTION, SOLUTION INTRAVENOUS at 12:57

## 2023-10-25 RX ADMIN — FENTANYL CITRATE 50 MCG: 50 INJECTION, SOLUTION INTRAMUSCULAR; INTRAVENOUS at 15:35

## 2023-10-25 NOTE — PLAN OF CARE
Goal Outcome Evaluation:  Plan of Care Reviewed With: patient           Outcome Evaluation: Patient transferred from PACU. Stable upon transfer.

## 2023-10-25 NOTE — ANESTHESIA PREPROCEDURE EVALUATION
Anesthesia Evaluation     NPO Solid Status: > 8 hours  NPO Liquid Status: > 2 hours           Airway   Mallampati: III  TM distance: >3 FB  Neck ROM: full  no difficulty expected  Dental - normal exam     Pulmonary    (-) decreased breath sounds, wheezes  Cardiovascular - normal exam    (+) hypertension, hyperlipidemia      Neuro/Psych  GI/Hepatic/Renal/Endo    (+) obesity, diabetes mellitus    Musculoskeletal     Abdominal    Substance History      OB/GYN          Other                      Anesthesia Plan    ASA 2     general     intravenous induction     Anesthetic plan, risks, benefits, and alternatives have been provided, discussed and informed consent has been obtained with: patient.      CODE STATUS:    Code Status (Patient has no pulse and is not breathing): CPR (Attempt to Resuscitate)  Medical Interventions (Patient has pulse or is breathing): Full Support

## 2023-10-25 NOTE — PROGRESS NOTES
Orthopedic Progress Note    Subjective :   Patient seen and examined.  She is resting comfortably in her hospital bed.  No acute issues overnight.  Hemoglobin A1c was performed yesterday.  Hemoglobin A1c was 9.0.  CT was performed as well.  Patient currently n.p.o. in anticipation for surgery.    Objective :    Vital signs in last 24 hours:  Temp:  [97.1 °F (36.2 °C)-97.7 °F (36.5 °C)] 97.5 °F (36.4 °C)  Heart Rate:  [69-87] 74  Resp:  [16-24] 16  BP: (130-215)/() 135/65  Vitals:    10/24/23 1847 10/24/23 2113 10/25/23 0105 10/25/23 0558   BP: 147/78 150/76 130/70 135/65   BP Location: Right arm Right arm Right arm Right arm   Patient Position: Lying Lying Lying Lying   Pulse: 85 82 75 74   Resp: 24 18 16 16   Temp: 97.5 °F (36.4 °C) 97.3 °F (36.3 °C) 97.1 °F (36.2 °C) 97.5 °F (36.4 °C)   TempSrc: Oral Oral Oral Oral   SpO2: 100% 96% 96% 95%   Weight:       Height:           PHYSICAL EXAM:  Focused physical examination of the patient's left lower extremity was performed.  Patient's resting comfortably in her hospital bed.  She is in no acute distress.  Patient's left lower extremity is shortened.  It is externally rotated.  Previous incision over the left knee is well-healed.  No surrounding erythema.  No appreciable skin lesions.  No skin breakdown.  Range of motion of the left lower extremity was not assessed given the patient's fracture.  Her compartments are soft.  Her sensation is intact distally.  Foot warm and well-perfused.  EHL, FHL, TA, and GS are intact.  DP/TP are 2+.     LABS:  Results from last 7 days   Lab Units 10/24/23  0626   WBC 10*3/mm3 6.97   HEMOGLOBIN g/dL 13.4   HEMATOCRIT % 41.0   PLATELETS 10*3/mm3 232     Results from last 7 days   Lab Units 10/24/23  0756   SODIUM mmol/L 139   POTASSIUM mmol/L 4.4   CHLORIDE mmol/L 105   CO2 mmol/L 24.0   BUN mg/dL 24*   CREATININE mg/dL 1.00   GLUCOSE mg/dL 208*   CALCIUM mg/dL 9.6     Results from last 7 days   Lab Units 10/24/23  0856   INR   1.00     Study Result    Narrative & Impression   CT LEFT FEMUR WITHOUT CONTRAST     HISTORY: Evaluate periprosthetic distal femur fracture.     TECHNIQUE: Axial noncontrast left femur CT from proximal to the mid  femoral shaft level to below the knee is provided and correlated with  femur and hip x-rays from earlier this morning. Radiation dose reduction  techniques were utilized, including automated exposure control and  exposure modulation based on body size.     FINDINGS: There is a total knee arthroplasty hardware and a comminuted,  displaced distal femoral diametaphysis fracture with posterior  displacement of the distal femoral fragment a full bone width. The  distal portion of the fracture extends to the upper edge of the femoral  component anteriorly. The fracture planes do not appear to extend beyond  that level. The patella, proximal tibia and fibula are intact. There is  no evidence of underlying bone lesion.     The significant posterior displacement of the knee relative to the  proximal femur fracture fragment significantly deforms to the quadriceps  tendon bundle but that structure appears to remain intact.     IMPRESSION:  Comminuted distal left femoral diametaphysis fracture  extends to the level of the femoral component anteriorly along the  midline. The remainder of the fracture appears to terminate at the level  of the metaphysis.     This report was finalized on 10/24/2023 11:35 AM by Dr. Arben Clemons M.D on Workstation: [id number]       ASSESSMENT:   Closed displaced comminuted fracture of shaft of left femur     Plan:  I did have an extensive discussion with the patient regards her situation in the hospital today.  I have reviewed the above.  CT scan has been reviewed.  CT scan will be reviewed by my supervising physician.  Patient currently n.p.o. at this time in anticipation for surgery today.  Plan for left intramedullary retrograde nailing versus open reduction internal fixation of  her distal femur.  Patient is on the operative schedule for this afternoon.  Please continue to be n.p.o. at this time.  Hold any anticoagulation medication in anticipation for surgery.    Thank you very much for allowing us to be a part of the patient's care.  Further recommendations to follow throughout her hospital course as well as postoperatively.    Patrick Anaya PA-C    Date: 10/25/2023  Time: 06:06 EDT

## 2023-10-25 NOTE — ANESTHESIA POSTPROCEDURE EVALUATION
"Patient: Sasha Knapp    Procedure Summary       Date: 10/25/23 Room / Location: [facility] OR  / [facility] MAIN OR    Anesthesia Start: 1253 Anesthesia Stop: 1526    Procedure: LEFT FEMUR INTRAMEDULLARY NAILING RETROGRADE (Left: Thigh) Diagnosis:     Surgeons: Thong Torres MD Provider: Simone Dee MD    Anesthesia Type: general ASA Status: 2            Anesthesia Type: general    Vitals  Vitals Value Taken Time   /79 10/25/23 1615   Temp 36.9 °C (98.4 °F) 10/25/23 1522   Pulse 93 10/25/23 1618   Resp 16 10/25/23 1600   SpO2 97 % 10/25/23 1618   Vitals shown include unfiled device data.        Post Anesthesia Care and Evaluation    Patient location during evaluation: bedside  Patient participation: complete - patient participated  Level of consciousness: awake and alert  Pain score: 0  Pain management: adequate    Airway patency: patent  Anesthetic complications: No anesthetic complications    Cardiovascular status: acceptable  Respiratory status: acceptable  Hydration status: acceptable    Comments: /83   Pulse 85   Temp 36.9 °C (98.4 °F) (Oral)   Resp 16   Ht 162.6 cm (64\")   Wt 85.3 kg (188 lb)   SpO2 96%   BMI 32.27 kg/m²     "

## 2023-10-25 NOTE — PROGRESS NOTES
"    Name: Sasha Knapp ADMIT: 10/24/2023   : 1965  PCP: Provider, No Known    MRN: [id number] LOS: 1 days   AGE/SEX: 58 y.o. female  ROOM: [facility] OR/MAIN OR     Subjective     Resting in bed.  Appears dehydrated.  Creatinine has increased.  Objective   Objective   Vital Signs  Temp:  [97 °F (36.1 °C)-97.8 °F (36.6 °C)] 97.8 °F (36.6 °C)  Heart Rate:  [74-85] 75  Resp:  [16-24] 18  BP: (130-177)/(65-80) 177/80  SpO2:  [95 %-100 %] 100 %  on   ;   Device (Oxygen Therapy): room air  Body mass index is 32.27 kg/m².  Physical Exam  Constitutional:       General: She is not in acute distress.  HENT:      Head: Normocephalic.   Cardiovascular:      Rate and Rhythm: Normal rate and regular rhythm.   Pulmonary:      Effort: Pulmonary effort is normal. No respiratory distress.   Abdominal:      General: There is no distension.      Palpations: Abdomen is soft.      Tenderness: There is no abdominal tenderness.   Neurological:      Mental Status: She is alert and oriented to person, place, and time.         Results Review     I reviewed the patient's new clinical results.  Results from last 7 days   Lab Units 10/25/23  0621 10/24/23  0626   WBC 10*3/mm3 6.97 6.97   HEMOGLOBIN g/dL 10.9* 13.4   PLATELETS 10*3/mm3 229 232     Results from last 7 days   Lab Units 10/25/23  0621 10/24/23  0756   SODIUM mmol/L 133* 139   POTASSIUM mmol/L 4.4 4.4   CHLORIDE mmol/L 105 105   CO2 mmol/L 21.9* 24.0   BUN mg/dL 33* 24*   CREATININE mg/dL 1.34* 1.00   GLUCOSE mg/dL 191* 208*   Estimated Creatinine Clearance: 48.3 mL/min (A) (by C-G formula based on SCr of 1.34 mg/dL (H)).  Results from last 7 days   Lab Units 10/24/23  0756   ALBUMIN g/dL 4.1   BILIRUBIN mg/dL 0.5   ALK PHOS U/L 97   AST (SGOT) U/L 21   ALT (SGPT) U/L 17     Results from last 7 days   Lab Units 10/25/23  0621 10/24/23  0756   CALCIUM mg/dL 8.7 9.6   ALBUMIN g/dL  --  4.1     No results found for: \"COVID19\"  Hemoglobin A1C   Date/Time Value Ref Range " Status   10/24/2023 0626 9.00 (H) 4.80 - 5.60 % Final     Glucose   Date/Time Value Ref Range Status   10/25/2023 1208 132 (H) 70 - 130 mg/dL Final   10/25/2023 0746 206 (H) 70 - 130 mg/dL Final   10/24/2023 2105 146 (H) 70 - 130 mg/dL Final   10/24/2023 1715 202 (H) 70 - 130 mg/dL Final   10/24/2023 1004 173 (H) 70 - 130 mg/dL Final   10/24/2023 0856 196 (H) 70 - 130 mg/dL Final     No results found for this or any previous visit.      CT Lower Extremity Left Without Contrast  Narrative: CT LEFT FEMUR WITHOUT CONTRAST     HISTORY: Evaluate periprosthetic distal femur fracture.     TECHNIQUE: Axial noncontrast left femur CT from proximal to the mid  femoral shaft level to below the knee is provided and correlated with  femur and hip x-rays from earlier this morning. Radiation dose reduction  techniques were utilized, including automated exposure control and  exposure modulation based on body size.     FINDINGS: There is a total knee arthroplasty hardware and a comminuted,  displaced distal femoral diametaphysis fracture with posterior  displacement of the distal femoral fragment a full bone width. The  distal portion of the fracture extends to the upper edge of the femoral  component anteriorly. The fracture planes do not appear to extend beyond  that level. The patella, proximal tibia and fibula are intact. There is  no evidence of underlying bone lesion.     The significant posterior displacement of the knee relative to the  proximal femur fracture fragment significantly deforms to the quadriceps  tendon bundle but that structure appears to remain intact.     Impression: Comminuted distal left femoral diametaphysis fracture  extends to the level of the femoral component anteriorly along the  midline. The remainder of the fracture appears to terminate at the level  of the metaphysis.     This report was finalized on 10/24/2023 11:35 AM by Dr. Arben Clemons M.D on Workstation: [id number]     XR Chest 1 View, XR  Hip With or Without Pelvis 2 - 3 View Left, XR Femur 2 View Left  Narrative: XR CHEST 1 VW-, XR FEMUR 2 VW LEFT-, XR HIP W OR WO PELVIS 2-3 VIEW  LEFT-     HISTORY: Female who is 58 years-old, trauma     TECHNIQUE: Frontal view of the chest, 3 views of the left femur, frontal  view of the pelvis, 2 views of the left hip     COMPARISON: None available     FINDINGS:     Chest x-ray: Heart, mediastinum and pulmonary vasculature are  unremarkable. No focal pulmonary consolidation, pleural effusion, or  pneumothorax. No acute osseous process.     Pelvis, left hip, left femur: A comminuted, angulated, displaced  fracture involving the distal left femoral metaphysis is noted, just  above the left knee arthroplasty hardware. The main distal fracture  fragment is posteriorly displaced by about 1 shaft width. No other  fractures are identified. No dislocation. Hip joint spaces appear  preserved.     Impression: As described.     This report was finalized on 10/24/2023 7:01 AM by Dr. Irwin Skinner M.D on Workstation: [id number]       Scheduled Medications  amLODIPine, 10 mg, Oral, Q24H  [MAR Hold] ethyl alcohol, 2 swab , Nasal, Once  [MAR Hold] insulin lispro, 2-7 Units, Subcutaneous, 4x Daily AC & at Bedtime  [MAR Hold] rosuvastatin, 40 mg, Oral, Daily  [MAR Hold] senna-docusate sodium, 2 tablet, Oral, BID  [MAR Hold] sodium chloride, 10 mL, Intravenous, Q12H  sodium chloride, 3 mL, Intravenous, Q12H    Infusions  lactated ringers, 9 mL/hr, Last Rate: 100 mL/hr (10/25/23 1255)  sodium chloride, 100 mL/hr, Last Rate: Stopped (10/25/23 1139)    Diet  NPO Diet NPO Type: Strict NPO       Assessment/Plan     Active Hospital Problems    Diagnosis  POA    **Closed displaced comminuted fracture of shaft of left femur [S72.352A]  Yes    HTN (hypertension) [I10]  Yes    Type 2 diabetes mellitus, without long-term current use of insulin [E11.9]  Yes    HLD (hyperlipidemia) [E78.5]  Yes    Obesity (BMI 30-39.9) [E66.9]  Yes       Resolved Hospital Problems   No resolved problems to display.       58 y.o. female admitted with Closed displaced comminuted fracture of shaft of left femur.    Acute kidney injury  Most likely due to hypovolemia.  Start IV fluids    Left femur fracture from mechanical fall  History of bilateral knee replacements  -Ortho consulted-plan for OR tody  -Oral and IV pain meds     Diabetes type 2, A1c 9%  -Hold home Jardiance and metformin  -Low-dose sliding scale      Hypertension  -Continue home amlodipine 10 mg, may need additional agents during hospitalization if blood pressures remain uncontrolled     Hyperlipidemia-continue home statin    SCDs for DVT prophylaxis.  Full code.  Discussed with patient and nursing staff.  Anticipate discharge home with HH vs SNU facility in 2-3 days.      Mathew Castro MD  [facility]  10/25/23  13:50 EDT    Copied text on this note has been reviewed and updated as of 10/25/23

## 2023-10-25 NOTE — OP NOTE
FEMUR INTRAMEDULLARY NAILING RETROGRADE  Procedure Note    Sasha Knapp  10/25/2023    Pre-op Diagnosis: Left Distal Periprosthetic Femur Fracture  Post-op Diagnosis:Same  Procedure:   RETROGRADE INTRAMEDULLARY NAILING Left Distal Femur  Surgeon:  Thong Torres MD  Assistant: Patrick Anaya PA-C  Anesthesia: General with Block, Anesthesiologist: Luis Campos MD; Simone Dee MD  CRNA: Viri Zuluaga CRNA; Vickie Herrera CRNA  Staff: Circulator: Maggie Rothman RN; Sisi Frey RN  Scrub Person: Bulmaro Valladares  Vendor Representative: Manolo Harris  Assistant: Patrick Anaya PA-C CFA  Estimated Blood Loss:100ml  Specimens:* No orders in the log *   Drains: NONE  Complications: None    Components Utilized:     Implant Name Type Inv. Item Serial No.  Lot No. LRB No. Used Action   femoral nail retrograde [id number]     [id number] Left 1 Implanted   SCRW LK T2 ALPHA ADV 5X80MM - [id number] Implant SCRW LK T2 ALPHA ADV 5X80MM  [employer] [id number] Left 1 Implanted   SCRW LK FEM T2 ALPHA ADV 5X65MM - [id number] Implant SCRW LK FEM T2 ALPHA ADV 5X65MM  [employer] [id number] Left 1 Implanted   KWIRE THRD RECON 3.0W757DY - [id number] Implant KWIRE THRD RECON 3.0H699BM  [employer]IV[employer] [id number] Left 1 Implanted   SCRW LK T2 ALPHA TI 5X50MM STRL - [id number] Implant SCRW LK T2 ALPHA TI 5X50MM STRL  [employer] [id number] Left 1 Implanted   SCRW LK T2 ALPHA ADV 5X70MM - [id number] Implant SCRW LK T2 ALPHA ADV 5X70MM  [employer] [id number] Left 1 Implanted   SCRW LK FEM ALPHAT2 ADV 75MM - [id number] Implant SCRW LK FEM ALPHAT2 ADV 75MM  [employer] [id number] Left 1 Implanted   SCRW LK T2 ALPHA TI 5X37.5MM STRL - [id number] Implant SCRW LK T2 ALPHA TI 5X37.5MM STRL  [employer] [id number] Left 1 Implanted   DEV CONTRL TISS STRATAFIX SYMM PDS PLUS WILBUR CT-1 45CM - [id number] Implant DEV CONTRL TISS STRATAFIX SYMM PDS PLUS WILBUR CT-1 45CM  On license of [facility]  DIV OF J AND J [id number] Left 1 Implanted        Indication for Procedure:   The patient is a 58 y.o. female presents today after a fall and sustaining a Left periprosthetic distal femur fracture.  The patient was educated in risks of surgery that could include possible risk of infection, fracture malunion, non-union, deep venous thrombosis, pulmonary embolism, fracture, neurovascular injury, leg length discrepancy, dislocation, possible persistent pain, need for additional surgeries, anesthetic risks, medical risks including heart attack and stroke, and death.   The patient had the opportunity to ask questions, and concerns about the proposed surgery.  The patient also understood that medicine is not an exact science, and that outcomes of the surgical procedure may be less than desired. The patient wished to proceed.      Protocols for intravenous antibiotics and venous thrombosis were followed for this patient.  IV antibiotics were infused prior to surgery and will be discontinued within 24 hours of completion of the surgical procedure.  Thrombosis prophylaxis will be initiated within 24 hours of the completion of the surgical procedure.      Procedure:   After the patient was identified in the preoperative area, and the surgical site confirmed and marked, the patient was brought to the operating room on a stretcher. The above anesthesia was placed uneventfully.  They were placed supine on the operating room table.  A time-out procedure was performed.  The intravenous ancef antibiotics infusion was completed.  The operative leg was prepped and draped in the usual sterile fashion.    Closed reduction was attempted however unsuccessful to bring the fracture line to acceptable alignment.  In a 4 inch incision was made over the fracture site on the anterior aspect of the leg in line with her old total knee incision.  The quadriceps was split longitudinally in line with the incision.  Then using a key elevator was then placed into the distal posteriorly  displaced fracture alignment was then used to lever the fracture back anteriorly into anatomic alignment.  This was checked in both AP and lateral images to show good fracture reduction.   Then a 2 cm incision was made distally along the old incision line with a 10 blade scapel below the patella.  The patellar tendon was split longitudinally inline with the incision.  The awl was placed through the incision into the distal femur into the medullary canal.  The guide wire was placed into the distal femur.  The flexible reamers were then used until cortical chatter was felt.  The above size nail was selected on placed over the guidewire to the appropriate depth and confirmed with a C-arm images.     Then 5 screws were placed distal to the fracture using the drill guides.  One screw was placed superiorly anterior to posterior using perfect [place] technique.   There was adequate purchase with the screws.  Final C-arm images were obtained which showed the fracture to be in anatomical alignment and hardware in good position.  The wound was copiously irrigated.  The quadriceps was closed with #1 stratafix suture and the inferior wound was then closed with 0 Vicryl closing the patellar tendon split.   2-0 Vicryl was used to close the deep dermis.  And staples were used to close the skin.  Sterile dressings were applied.  The extremity was placed in a knee immobilizer.  Sponge and needle counts were completed and were correct.  The patient was awaken from anesthetic and was returned to recovery in stable condition.  There were no intra-operative complications.    Thong Torres MD     Date: 10/25/2023  Time: 14:57 EDT

## 2023-10-25 NOTE — ANESTHESIA PROCEDURE NOTES
Airway  Date/Time: 10/25/2023 1:00 PM  Airway not difficult    General Information and Staff    Patient location during procedure: OR  CRNA/CAA: Vickie Herrera CRNA    Indications and Patient Condition    Preoxygenated: yes  Mask difficulty assessment: 1 - vent by mask    Final Airway Details  Final airway type: endotracheal airway      Successful airway: ETT  Cuffed: yes   Successful intubation technique: direct laryngoscopy  Facilitating devices/methods: intubating stylet  Endotracheal tube insertion site: oral  Blade: Teixeira  Blade size: 2  ETT size (mm): 7.0  Cormack-Lehane Classification: grade I - full view of glottis  Placement verified by: capnometry   Measured from: lips  ETT/EBT  to lips (cm): 23  Number of attempts at approach: 1  Assessment: lips, teeth, and gum same as pre-op

## 2023-10-26 LAB
ANION GAP SERPL CALCULATED.3IONS-SCNC: 9 MMOL/L (ref 5–15)
BACTERIA UR QL AUTO: ABNORMAL /HPF
BILIRUB UR QL STRIP: NEGATIVE
BUN SERPL-MCNC: 35 MG/DL (ref 6–20)
BUN/CREAT SERPL: 29.4 (ref 7–25)
CALCIUM SPEC-SCNC: 8.7 MG/DL (ref 8.6–10.5)
CHLORIDE SERPL-SCNC: 105 MMOL/L (ref 98–107)
CLARITY UR: CLEAR
CO2 SERPL-SCNC: 21 MMOL/L (ref 22–29)
COLOR UR: YELLOW
CREAT SERPL-MCNC: 1.19 MG/DL (ref 0.57–1)
EGFRCR SERPLBLD CKD-EPI 2021: 53.1 ML/MIN/1.73
GLUCOSE BLDC GLUCOMTR-MCNC: 198 MG/DL (ref 70–130)
GLUCOSE BLDC GLUCOMTR-MCNC: 225 MG/DL (ref 70–130)
GLUCOSE BLDC GLUCOMTR-MCNC: 226 MG/DL (ref 70–130)
GLUCOSE BLDC GLUCOMTR-MCNC: 238 MG/DL (ref 70–130)
GLUCOSE SERPL-MCNC: 225 MG/DL (ref 65–99)
GLUCOSE UR STRIP-MCNC: ABNORMAL MG/DL
HCT VFR BLD AUTO: 34 % (ref 34–46.6)
HGB BLD-MCNC: 11.2 G/DL (ref 12–15.9)
HGB UR QL STRIP.AUTO: ABNORMAL
HYALINE CASTS UR QL AUTO: ABNORMAL /LPF
KETONES UR QL STRIP: NEGATIVE
LEUKOCYTE ESTERASE UR QL STRIP.AUTO: NEGATIVE
NITRITE UR QL STRIP: NEGATIVE
PH UR STRIP.AUTO: 5.5 [PH] (ref 5–8)
POTASSIUM SERPL-SCNC: 4.8 MMOL/L (ref 3.5–5.2)
PROT UR QL STRIP: ABNORMAL
RBC # UR STRIP: ABNORMAL /HPF
REF LAB TEST METHOD: ABNORMAL
SODIUM SERPL-SCNC: 135 MMOL/L (ref 136–145)
SP GR UR STRIP: 1.02 (ref 1–1.03)
SQUAMOUS #/AREA URNS HPF: ABNORMAL /HPF
UROBILINOGEN UR QL STRIP: ABNORMAL
WBC # UR STRIP: ABNORMAL /HPF

## 2023-10-26 PROCEDURE — 85014 HEMATOCRIT: CPT | Performed by: ORTHOPAEDIC SURGERY

## 2023-10-26 PROCEDURE — 81001 URINALYSIS AUTO W/SCOPE: CPT | Performed by: STUDENT IN AN ORGANIZED HEALTH CARE EDUCATION/TRAINING PROGRAM

## 2023-10-26 PROCEDURE — 97535 SELF CARE MNGMENT TRAINING: CPT

## 2023-10-26 PROCEDURE — 25010000002 HYDROMORPHONE PER 4 MG: Performed by: ORTHOPAEDIC SURGERY

## 2023-10-26 PROCEDURE — 25810000003 SODIUM CHLORIDE 0.9 % SOLUTION: Performed by: ORTHOPAEDIC SURGERY

## 2023-10-26 PROCEDURE — 80048 BASIC METABOLIC PNL TOTAL CA: CPT | Performed by: ORTHOPAEDIC SURGERY

## 2023-10-26 PROCEDURE — 97530 THERAPEUTIC ACTIVITIES: CPT

## 2023-10-26 PROCEDURE — 85018 HEMOGLOBIN: CPT | Performed by: ORTHOPAEDIC SURGERY

## 2023-10-26 PROCEDURE — 25010000002 CEFAZOLIN IN DEXTROSE 2-4 GM/100ML-% SOLUTION: Performed by: ORTHOPAEDIC SURGERY

## 2023-10-26 PROCEDURE — 97116 GAIT TRAINING THERAPY: CPT

## 2023-10-26 PROCEDURE — 82948 REAGENT STRIP/BLOOD GLUCOSE: CPT

## 2023-10-26 PROCEDURE — 63710000001 INSULIN LISPRO (HUMAN) PER 5 UNITS: Performed by: ORTHOPAEDIC SURGERY

## 2023-10-26 PROCEDURE — 63710000001 INSULIN GLARGINE PER 5 UNITS: Performed by: STUDENT IN AN ORGANIZED HEALTH CARE EDUCATION/TRAINING PROGRAM

## 2023-10-26 PROCEDURE — 97166 OT EVAL MOD COMPLEX 45 MIN: CPT

## 2023-10-26 PROCEDURE — 63710000001 INSULIN LISPRO (HUMAN) PER 5 UNITS: Performed by: STUDENT IN AN ORGANIZED HEALTH CARE EDUCATION/TRAINING PROGRAM

## 2023-10-26 RX ORDER — VALSARTAN 160 MG/1
320 TABLET ORAL DAILY
COMMUNITY

## 2023-10-26 RX ORDER — AMLODIPINE BESYLATE 10 MG/1
10 TABLET ORAL DAILY
Status: DISCONTINUED | OUTPATIENT
Start: 2023-10-26 | End: 2023-10-28 | Stop reason: HOSPADM

## 2023-10-26 RX ORDER — INSULIN LISPRO 100 [IU]/ML
5 INJECTION, SOLUTION INTRAVENOUS; SUBCUTANEOUS
Status: DISCONTINUED | OUTPATIENT
Start: 2023-10-26 | End: 2023-10-28 | Stop reason: HOSPADM

## 2023-10-26 RX ORDER — VALSARTAN 320 MG/1
320 TABLET ORAL DAILY
Status: DISCONTINUED | OUTPATIENT
Start: 2023-10-26 | End: 2023-10-28 | Stop reason: HOSPADM

## 2023-10-26 RX ADMIN — INSULIN LISPRO 2 UNITS: 100 INJECTION, SOLUTION INTRAVENOUS; SUBCUTANEOUS at 07:43

## 2023-10-26 RX ADMIN — ASPIRIN 81 MG: 81 TABLET, COATED ORAL at 08:46

## 2023-10-26 RX ADMIN — Medication 10 ML: at 21:46

## 2023-10-26 RX ADMIN — HYDROCODONE BITARTRATE AND ACETAMINOPHEN 1 TABLET: 10; 325 TABLET ORAL at 07:44

## 2023-10-26 RX ADMIN — DOCUSATE SODIUM 50MG AND SENNOSIDES 8.6MG 2 TABLET: 8.6; 5 TABLET, FILM COATED ORAL at 08:46

## 2023-10-26 RX ADMIN — Medication 10 ML: at 08:48

## 2023-10-26 RX ADMIN — INSULIN LISPRO 3 UNITS: 100 INJECTION, SOLUTION INTRAVENOUS; SUBCUTANEOUS at 12:09

## 2023-10-26 RX ADMIN — INSULIN GLARGINE 20 UNITS: 100 INJECTION, SOLUTION SUBCUTANEOUS at 22:05

## 2023-10-26 RX ADMIN — DOCUSATE SODIUM 50MG AND SENNOSIDES 8.6MG 2 TABLET: 8.6; 5 TABLET, FILM COATED ORAL at 21:41

## 2023-10-26 RX ADMIN — ASPIRIN 81 MG: 81 TABLET, COATED ORAL at 21:41

## 2023-10-26 RX ADMIN — ROSUVASTATIN CALCIUM 40 MG: 40 TABLET, FILM COATED ORAL at 08:46

## 2023-10-26 RX ADMIN — HYDROCODONE BITARTRATE AND ACETAMINOPHEN 2 TABLET: 10; 325 TABLET ORAL at 17:25

## 2023-10-26 RX ADMIN — AMLODIPINE BESYLATE 10 MG: 10 TABLET ORAL at 08:46

## 2023-10-26 RX ADMIN — HYDROMORPHONE HYDROCHLORIDE 0.5 MG: 1 INJECTION, SOLUTION INTRAMUSCULAR; INTRAVENOUS; SUBCUTANEOUS at 14:41

## 2023-10-26 RX ADMIN — FAMOTIDINE 40 MG: 20 TABLET, FILM COATED ORAL at 08:46

## 2023-10-26 RX ADMIN — INSULIN LISPRO 3 UNITS: 100 INJECTION, SOLUTION INTRAVENOUS; SUBCUTANEOUS at 22:05

## 2023-10-26 RX ADMIN — CEFAZOLIN SODIUM 2000 MG: 2 INJECTION, SOLUTION INTRAVENOUS at 04:53

## 2023-10-26 RX ADMIN — POLYETHYLENE GLYCOL 3350 17 G: 17 POWDER, FOR SOLUTION ORAL at 08:46

## 2023-10-26 RX ADMIN — HYDROMORPHONE HYDROCHLORIDE 0.5 MG: 1 INJECTION, SOLUTION INTRAMUSCULAR; INTRAVENOUS; SUBCUTANEOUS at 10:24

## 2023-10-26 RX ADMIN — SODIUM CHLORIDE 100 ML/HR: 9 INJECTION, SOLUTION INTRAVENOUS at 09:41

## 2023-10-26 RX ADMIN — VALSARTAN 320 MG: 320 TABLET, FILM COATED ORAL at 17:02

## 2023-10-26 RX ADMIN — HYDROMORPHONE HYDROCHLORIDE 0.5 MG: 1 INJECTION, SOLUTION INTRAMUSCULAR; INTRAVENOUS; SUBCUTANEOUS at 21:41

## 2023-10-26 RX ADMIN — HYDROCODONE BITARTRATE AND ACETAMINOPHEN 2 TABLET: 10; 325 TABLET ORAL at 03:14

## 2023-10-26 RX ADMIN — HYDROMORPHONE HYDROCHLORIDE 0.5 MG: 1 INJECTION, SOLUTION INTRAMUSCULAR; INTRAVENOUS; SUBCUTANEOUS at 18:24

## 2023-10-26 RX ADMIN — SODIUM CHLORIDE 100 ML/HR: 9 INJECTION, SOLUTION INTRAVENOUS at 18:30

## 2023-10-26 RX ADMIN — INSULIN LISPRO 3 UNITS: 100 INJECTION, SOLUTION INTRAVENOUS; SUBCUTANEOUS at 17:00

## 2023-10-26 RX ADMIN — INSULIN LISPRO 5 UNITS: 100 INJECTION, SOLUTION INTRAVENOUS; SUBCUTANEOUS at 17:00

## 2023-10-26 NOTE — PLAN OF CARE
Pt. is A&O X 4. Complained of pain to LLE. Pain Meds PRN given at night. See MAR. AC&HS. External Catheter applied. IVF is at 100 ml/hr. ABX given twice at night. Oxygen applied at 2 L by NC. Call light within reach.    Goal Outcome Evaluation:  Plan of Care Reviewed With: patient  Progress: improving

## 2023-10-26 NOTE — THERAPY EVALUATION
Patient Name: Sasha Knapp  : 1965    MRN: [id number]                              Today's Date: 10/26/2023       Admit Date: 10/24/2023    Visit Dx:     ICD-10-CM ICD-9-CM   1. Closed displaced comminuted fracture of shaft of left femur, initial encounter  S72.352A 821.01     Patient Active Problem List   Diagnosis    Closed displaced comminuted fracture of shaft of left femur    HTN (hypertension)    Type 2 diabetes mellitus, without long-term current use of insulin    HLD (hyperlipidemia)    Obesity (BMI 30-39.9)     Past Medical History:   Diagnosis Date    Diabetes mellitus     Hyperlipidemia     Hypertension     Sleep apnea      Past Surgical History:   Procedure Laterality Date    CARPAL TUNNEL RELEASE Left     HYSTERECTOMY  2000    REPLACEMENT TOTAL KNEE BILATERAL Bilateral       General Information       Row Name 10/26/23 1010          Physical Therapy Time and Intention    Document Type evaluation  -     Mode of Treatment individual therapy;physical therapy  -       Row Name 10/26/23 1010          General Information    Prior Level of Function independent:;gait;transfer;bed mobility  walks with cane at baseline  -     Existing Precautions/Restrictions fall;non-weight bearing  LLE NWB  -     Barriers to Rehab medically complex;previous functional deficit  -       Row Name 10/26/23 1010          Living Environment    People in Home alone  -       Row Name 10/26/23 1010          Home Main Entrance    Number of Stairs, Main Entrance --  pt lives in [facility] with a flight of stairs  -       Row Name 10/26/23 1010          Cognition    Orientation Status (Cognition) oriented x 3  -       Row Name 10/26/23 1010          Safety Issues, Functional Mobility    Safety Issues Affecting Function (Mobility) awareness of need for assistance;impulsivity;insight into deficits/self-awareness;judgment;positioning of assistive device;safety precaution awareness;problem-solving  -      Impairments Affecting Function (Mobility) balance;endurance/activity tolerance;pain;range of motion (ROM);strength  -               User Key  (r) = Recorded By, (t) = Taken By, (c) = Cosigned By      Initials Name Provider Type     Meka Pandya, PT Physical Therapist                   Mobility       Row Name 10/26/23 1012          Bed Mobility    Bed Mobility supine-sit;sit-supine  -     Supine-Sit Wallace (Bed Mobility) verbal cues;nonverbal cues (demo/gesture);minimum assist (75% patient effort)  -     Sit-Supine Wallace (Bed Mobility) verbal cues;nonverbal cues (demo/gesture);standby assist  required increased time and effort but insisted on doing it herself  -     Assistive Device (Bed Mobility) bed rails  -       Row Name 10/26/23 1012          Sit-Stand Transfer    Sit-Stand Wallace (Transfers) verbal cues;nonverbal cues (demo/gesture);contact guard  -     Assistive Device (Sit-Stand Transfers) walker, front-wheeled  -       Row Name 10/26/23 1012          Gait/Stairs (Locomotion)    Wallace Level (Gait) verbal cues;nonverbal cues (demo/gesture);contact guard  -     Assistive Device (Gait) walker, front-wheeled  -     Distance in Feet (Gait) 20  -     Deviations/Abnormal Patterns (Gait) jonah decreased;gait speed decreased;stride length decreased  -     Comment, (Gait/Stairs) repeated cues to maintain NWB on LLE, also instructed to not walk to closely to walker  -       Row Name 10/26/23 1012          Mobility    Extremity Weight-bearing Status left lower extremity  -     Left Lower Extremity (Weight-bearing Status) non weight-bearing (NWB)  -               User Key  (r) = Recorded By, (t) = Taken By, (c) = Cosigned By      Initials Name Provider Type    Meka Huffman, PT Physical Therapist                   Obj/Interventions       Row Name 10/26/23 1014          Range of Motion Comprehensive    Comment, General Range of Motion L LE in knee  immoblizer, all other ROM appears grossly WFL  -       Row Name 10/26/23 1014          Strength Comprehensive (MMT)    Comment, General Manual Muscle Testing (MMT) Assessment L LE weakness noted post-op  -       Row Name 10/26/23 1014          Motor Skills    Therapeutic Exercise --  10 reps B LE AP  -       Row Name 10/26/23 1014          Balance    Balance Assessment standing static balance;standing dynamic balance  -     Static Standing Balance verbal cues;non-verbal cues (demo/gesture);contact guard  -CH     Dynamic Standing Balance verbal cues;non-verbal cues (demo/gesture);contact guard  -CH     Position/Device Used, Standing Balance walker, rolling  -CH               User Key  (r) = Recorded By, (t) = Taken By, (c) = Cosigned By      Initials Name Provider Type     Meka Pandya, PT Physical Therapist                   Goals/Plan       Row Name 10/26/23 1022          Bed Mobility Goal 1 (PT)    Activity/Assistive Device (Bed Mobility Goal 1, PT) bed mobility activities, all  -CH     Lorain Level/Cues Needed (Bed Mobility Goal 1, PT) supervision required  -CH     Time Frame (Bed Mobility Goal 1, PT) 1 week  -       Row Name 10/26/23 1022          Transfer Goal 1 (PT)    Activity/Assistive Device (Transfer Goal 1, PT) transfers, all;walker, rolling  -CH     [place] Level/Cues Needed (Transfer Goal 1, PT) supervision required  -CH     Time Frame (Transfer Goal 1, PT) 1 week  -       Row Name 10/26/23 1022          Gait Training Goal 1 (PT)    Activity/Assistive Device (Gait Training Goal 1, PT) gait (walking locomotion);walker, rolling  -CH     L[place] Level (Gait Training Goal 1, PT) supervision required  -     Distance (Gait Training Goal 1, PT) 30  -CH     Time Frame (Gait Training Goal 1, PT) 1 week  -       Row Name 10/26/23 1022          Therapy Assessment/Plan (PT)    Planned Therapy Interventions (PT) balance training;bed mobility training;gait training;home  exercise program;patient/family education;strengthening;transfer training  -               User Key  (r) = Recorded By, (t) = Taken By, (c) = Cosigned By      Initials Name Provider Type    Meka Huffman, PT Physical Therapist                   Clinical Impression       Row Name 10/26/23 1015          Pain    Pretreatment Pain Rating 3/10  -     Posttreatment Pain Rating 3/10  -     Pain Location - Side/Orientation Bilateral  -     Pain Location - knee  -     Pain Intervention(s) Repositioned  -       Row Name 10/26/23 1015          Plan of Care Review    Plan of Care Reviewed With patient  -     Outcome Evaluation Pt is a 59 yo F who is post-op L hip ORIF s/p fall down steps with fracture. Pt is to maintain NWB on LLE. Pt presents to PT with impaired functional mobility and gait secondary to L LE pain, weakness, and decreased activity tolerance. PT was able to maintain NWB on LLE with repeated cues when ambulating in room. Pt has extensive history of B LE surgery and lives a lone in a home with many stairs. Pt may benefit from skilled PT to address strength, mobility, and gait. PT may benefit from SNF placement upon discharge.  -       Row Name 10/26/23 1015          Therapy Assessment/Plan (PT)    Patient/Family Therapy Goals Statement (PT) to return to [facility]  -     Rehab Potential (PT) good, to achieve stated therapy goals  -     Criteria for Skilled Interventions Met (PT) skilled treatment is necessary  -     Therapy Frequency (PT) 5 times/wk  -       Row Name 10/26/23 1015          Positioning and Restraints    Pre-Treatment Position in bed  -     Post Treatment Position bed  -     In Bed supine;call light within reach;encouraged to call for assist;exit alarm on;with family/caregiver  -               User Key  (r) = Recorded By, (t) = Taken By, (c) = Cosigned By      Initials Name Provider Type    Meka Huffman, PT Physical Therapist                   Outcome  Measures       Row Name 10/26/23 1023 10/26/23 0845       How much help from another person do you currently need...    Turning from your back to your side while in flat bed without using bedrails? 3  -CH 2  -LB    Moving from lying on back to sitting on the side of a flat bed without bedrails? 3  -CH 2  -LB    Moving to and from a bed to a chair (including a wheelchair)? 3  -CH 1  -LB    Standing up from a chair using your arms (e.g., wheelchair, bedside chair)? 3  -CH 1  -LB    Climbing 3-5 steps with a railing? 1  -CH 1  -LB    To walk in hospital room? 2  -CH 1  -LB    AM-PAC 6 Clicks Score (PT) 15  -CH 8  -LB    Highest level of mobility 4 --> Transferred to chair/commode  -CH 3 --> Sat at edge of bed  -LB      Row Name 10/26/23 0001          How much help from another person do you currently need...    Turning from your back to your side while in flat bed without using bedrails? 2  -HL     Moving from lying on back to sitting on the side of a flat bed without bedrails? 2  -HL     Moving to and from a bed to a chair (including a wheelchair)? 2  -HL     Standing up from a chair using your arms (e.g., wheelchair, bedside chair)? 2  -HL     Climbing 3-5 steps with a railing? 2  -HL     To walk in hospital room? 2  -HL     AM-PAC 6 Clicks Score (PT) 12  -HL     Highest level of mobility 4 --> Transferred to chair/commode  -HL       Row Name 10/26/23 1023          Functional Assessment    Outcome Measure Options AM-PAC 6 Clicks Basic Mobility (PT)  -CH               User Key  (r) = Recorded By, (t) = Taken By, (c) = Cosigned By      Initials Name Provider Type    CH Meka Pandya, PT Physical Therapist    HL John Anthony, FANNIE Registered Nurse    LB Lani Cazares, RN Registered Nurse                                 Physical Therapy Education       Title: PT OT SLP Therapies (Done)       Topic: Physical Therapy (Done)       Point: Mobility training (Done)       Learning Progress Summary             Patient  Acceptance, E,TB,D, VU,NR by  at 10/26/2023 1024                         Point: Home exercise program (Done)       Learning Progress Summary             Patient Acceptance, E,TB,D, VU,NR by  at 10/26/2023 1024                         Point: Body mechanics (Done)       Learning Progress Summary             Patient Acceptance, E,TB,D, VU,NR by  at 10/26/2023 1024                         Point: Precautions (Done)       Learning Progress Summary             Patient Acceptance, E,TB,D, VU,NR by  at 10/26/2023 1024                                         User Key       Initials Effective Dates Name Provider Type Discipline     06/16/21 -  Meka Pandya, PT Physical Therapist PT                  PT Recommendation and Plan  Planned Therapy Interventions (PT): balance training, bed mobility training, gait training, home exercise program, patient/family education, strengthening, transfer training  Plan of Care Reviewed With: patient  Outcome Evaluation: Pt is a 59 yo F who is post-op L hip ORIF s/p fall down steps with fracture. Pt is to maintain NWB on LLE. Pt presents to PT with impaired functional mobility and gait secondary to L LE pain, weakness, and decreased activity tolerance. PT was able to maintain NWB on LLE with repeated cues when ambulating in room. Pt has extensive history of B LE surgery and lives a lone in a home with many stairs. Pt may benefit from skilled PT to address strength, mobility, and gait. PT may benefit from SNF placement upon discharge.     Time Calculation:         PT Charges       Row Name 10/26/23 1024             Time Calculation    Start Time 0913  -      Stop Time 0943  -      Time Calculation (min) 30 min  -      PT Received On 10/26/23  -      PT - Next Appointment 10/27/23  -      PT Goal Re-Cert Due Date 11/02/23  -         Time Calculation- PT    Total Timed Code Minutes- PT 23 minute(s)  -         Timed Charges    24214 - Gait Training Minutes  15  -       44911 - PT Therapeutic Activity Minutes 8  -CH         Total Minutes    Timed Charges Total Minutes 23  -CH       Total Minutes 23  -CH                User Key  (r) = Recorded By, (t) = Taken By, (c) = Cosigned By      Initials Name Provider Type     Meka Pandya, PT Physical Therapist                  Therapy Charges for Today       Code Description Service Date Service Provider Modifiers Qty    [id number]  GAIT TRAINING EA 15 MIN 10/26/2023 Meka Pandya, PT GP 1    [id number]  PT THERAPEUTIC ACT EA 15 MIN 10/26/2023 Meka Pandya, PT GP 1            PT G-Codes  Outcome Measure Options: AM-PAC 6 Clicks Basic Mobility (PT)  AM-PAC 6 Clicks Score (PT): 15  PT Discharge Summary  Anticipated Discharge Disposition (PT): skilled nursing facility    Meka Pandya, PT  10/26/2023

## 2023-10-26 NOTE — CONSULTS
Diabetes Education    Patient Name:  Sasha Knapp  YOB: 1965  MRN: [id number]  Admit Date:  10/24/2023        Spoke with pt briefly,she states she has a meter. She states she has a PCP. She is taking Jardience and glucophage at home but is taking insulin PRN? Pt states she goes long periods of time without eating so PCP hesitant to use insulin but recent A1C of 9% indicates some adjustment in medication needed. Pt is also awaiting a referral to a kidney specialist that has taken two months to get into.    We will return for further discussion. She may be going to rehab from hospital stay      Electronically signed by:  Gloria Britt RN  10/26/23 09:18 EDT

## 2023-10-26 NOTE — PAYOR COMM NOTE
"Adair Rea (58 y.o. Female)     PLEASE SEE ATTACHED FOR CONTINUED INPT STAY DAYS    REF #   [id number]     PLEASE CALL DUKE JAMA RN/ DEPT @ [phone]   OR [phone]    THANK YOU  DUKE JAMA RN  [facility]         Date of Birth   1965    Social Security Number       Address   [address] [place] [address]    Home Phone   [phone]    MRN   [id number]       Anglican   [facility], [place]    Marital Status   Single                            Admission Date   10/24/23    Admission Type   Emergency    Admitting Provider   uM Esteves MD    Attending Provider   Mathew Castro MD    Department, Room/Bed   [address], S513/1       Discharge Date       Discharge Disposition       Discharge Destination                                 Attending Provider: Mathew Castro MD    Allergies: No Known Allergies    Isolation: None   Infection: None   Code Status: CPR    Ht: 162.6 cm (64\")   Wt: 85.3 kg (188 lb)    Admission Cmt: None   Principal Problem: Closed displaced comminuted fracture of shaft of left femur [S72.352A]                   Active Insurance as of 10/24/2023       Primary Coverage       Payor Plan Insurance Group Employer/Plan Group    [employer]HEM BLUE CROSS ANTHEM BLUE CROSS BLUE SHIELD PPO [id number]       Payor Plan Address Payor Plan Phone Number Payor Plan Fax Number Effective Dates    [address] [phone]  1/1/2023 - None Entered    [facility] [id number]         Subscriber Name Subscriber Birth Date Member ID       ADAIR REA 1965 [id number]               Secondary Coverage       Payor Plan Insurance Group Employer/Plan Group    [place] [id number]       Payor Plan Address Payor Plan Phone Number Payor Plan Fax Number Effective Dates    [employer] [address] [address] [phone]  1/1/2021 - None Entered    [facility] [address]         Subscriber Name Subscriber Birth Date Member ID       " ADAIR REA ENEIDA 1965 [id number]                     Emergency Contacts        (Rel.) Home Phone Work Phone Mobile Phone    Edis Najera (Brother) -- -- [phone]              [place]: [facility] [phone]  Tax ID [id number]     Physician Progress Notes (last 48 hours)        Rashad Reza APRN at 10/26/23 0702       Attestation signed by Thong Torres MD at 10/26/23 0796    I have reviewed this documentation examined the patient and agree with above.  OK to remove knee immobilizer when in bed or chair.  Ok to perform ROM exercises when in bed or chair.  To wear knee immobilizer when performing transfers and ambulating.  Will need rehab placement as has no family in [place] area.  Ok to transfer to rehab when bed available.  Change dressing in 1 week or sooner if needed if dressing falling off or saturated.  Keep incisions covered until staple removal in office.  Aspirin 81 mg po bid for 4 weeks.  Will sign off.  Follow up in the office in 2 weeks.  Office phone #[phone].      Thong Torres MD                    Procedure(s):  LEFT FEMUR INTRAMEDULLARY NAILING RETROGRADE     LOS: 2 days     Subjective :   Patient seen and examined.  Resting comfortably.  Alert, responding appropriately to questions.  Pain controlled.  Denies any new problems overnight.      Objective :    Vital signs in last 24 hours:  Vitals:    10/25/23 1700 10/25/23 1715 10/25/23 1900 10/25/23 2300   BP: 177/69 163/75 167/88 168/86   BP Location:  Right arm Right arm Right arm   Patient Position:  Lying Lying Lying   Pulse: 84 86 95 104   Resp: 16 16 16 16   Temp:  98 °F (36.7 °C) 98.4 °F (36.9 °C) 98.6 °F (37 °C)   TempSrc:  Oral Oral Oral   SpO2: 98% 99% 98% 99%   Weight:       Height:           PHYSICAL EXAM:  Patient is calm, in no acute distress, awake and oriented x 3.  Dressing is clean, dry and intact.  No signs of infection.  Swelling is appropriate in amount.  Ecchymosis is appropriate in amount.  EHL,  FHL, TA, GS intact.  Patient is neurovascularly intact distally.    LABS:  Results from last 7 days   Lab Units 10/26/23  0509 10/25/23  2110 10/25/23  0621   WBC 10*3/mm3  --   --  6.97   HEMOGLOBIN g/dL 11.2*   < > 10.9*   HEMATOCRIT % 34.0   < > 33.6*   PLATELETS 10*3/mm3  --   --  229    < > = values in this interval not displayed.     Results from last 7 days   Lab Units 10/26/23  0509   SODIUM mmol/L 135*   POTASSIUM mmol/L 4.8   CHLORIDE mmol/L 105   CO2 mmol/L 21.0*   BUN mg/dL 35*   CREATININE mg/dL 1.19*   GLUCOSE mg/dL 225*   CALCIUM mg/dL 8.7     Results from last 7 days   Lab Units 10/24/23  0856   INR  1.00         ASSESSMENT:  Status post Procedure(s):  LEFT FEMUR INTRAMEDULLARY NAILING RETROGRADE      Plan:  Continue Physical Therapy, increase mobility  NWB to the operative extremity    Continue SCDs & DVT prophylaxis  Aspirin 81 mg BID for VTE chemoprophylaxis    Surgical dressings to be left in place x1 week.  Then can be removed and replaced with daily dry dressings.  Okay to shower.  Do not submerge incisions.    Dispo planning per primary team for home with home health and family assistance versus SNF, depending on PT eval and patient safety needs    Plan for routine postoperative follow-up in our office in 2 weeks for x-ray, wound assessment, and staple removal.    Plan of care above was discussed with the patient at bedside this morning.  She stated understanding and was agreeable.  She was given opportunity ask questions and all questions were answered to her satisfaction.    Rashad Reza, APRN    Date: 10/26/2023  Time: 07:02 EDT      Electronically signed by Thong Torres MD at 10/26/23 0724       Mathew Castro MD at 10/25/23 1349              Name: Sasha Knapp ADMIT: 10/24/2023   : 1965  PCP: Provider, No Known    MRN: [id number] LOS: 1 days   AGE/SEX: 58 y.o. female  ROOM: [facility] Main OR/MAIN OR     Subjective     Resting in bed.  Appears dehydrated.  Creatinine has  "increased.  Objective   Objective   Vital Signs  Temp:  [97 °F (36.1 °C)-97.8 °F (36.6 °C)] 97.8 °F (36.6 °C)  Heart Rate:  [74-85] 75  Resp:  [16-24] 18  BP: (130-177)/(65-80) 177/80  SpO2:  [95 %-100 %] 100 %  on   ;   Device (Oxygen Therapy): room air  Body mass index is 32.27 kg/m².  Physical Exam  Constitutional:       General: She is not in acute distress.  HENT:      Head: Normocephalic.   Cardiovascular:      Rate and Rhythm: Normal rate and regular rhythm.   Pulmonary:      Effort: Pulmonary effort is normal. No respiratory distress.   Abdominal:      General: There is no distension.      Palpations: Abdomen is soft.      Tenderness: There is no abdominal tenderness.   Neurological:      Mental Status: She is alert and oriented to person, place, and time.         Results Review     I reviewed the patient's new clinical results.  Results from last 7 days   Lab Units 10/25/23  0621 10/24/23  0626   WBC 10*3/mm3 6.97 6.97   HEMOGLOBIN g/dL 10.9* 13.4   PLATELETS 10*3/mm3 229 232     Results from last 7 days   Lab Units 10/25/23  0621 10/24/23  0756   SODIUM mmol/L 133* 139   POTASSIUM mmol/L 4.4 4.4   CHLORIDE mmol/L 105 105   CO2 mmol/L 21.9* 24.0   BUN mg/dL 33* 24*   CREATININE mg/dL 1.34* 1.00   GLUCOSE mg/dL 191* 208*   Estimated Creatinine Clearance: 48.3 mL/min (A) (by C-G formula based on SCr of 1.34 mg/dL (H)).  Results from last 7 days   Lab Units 10/24/23  0756   ALBUMIN g/dL 4.1   BILIRUBIN mg/dL 0.5   ALK PHOS U/L 97   AST (SGOT) U/L 21   ALT (SGPT) U/L 17     Results from last 7 days   Lab Units 10/25/23  0621 10/24/23  0756   CALCIUM mg/dL 8.7 9.6   ALBUMIN g/dL  --  4.1     No results found for: \"COVID19\"  Hemoglobin A1C   Date/Time Value Ref Range Status   10/24/2023 0626 9.00 (H) 4.80 - 5.60 % Final     Glucose   Date/Time Value Ref Range Status   10/25/2023 1208 132 (H) 70 - 130 mg/dL Final   10/25/2023 0746 206 (H) 70 - 130 mg/dL Final   10/24/2023 2105 146 (H) 70 - 130 mg/dL Final "   10/24/2023 1715 202 (H) 70 - 130 mg/dL Final   10/24/2023 1004 173 (H) 70 - 130 mg/dL Final   10/24/2023 0856 196 (H) 70 - 130 mg/dL Final     No results found for this or any previous visit.      CT Lower Extremity Left Without Contrast  Narrative: CT LEFT FEMUR WITHOUT CONTRAST     HISTORY: Evaluate periprosthetic distal femur fracture.     TECHNIQUE: Axial noncontrast left femur CT from proximal to the mid  femoral shaft level to below the knee is provided and correlated with  femur and hip x-rays from earlier this morning. Radiation dose reduction  techniques were utilized, including automated exposure control and  exposure modulation based on body size.     FINDINGS: There is a total knee arthroplasty hardware and a comminuted,  displaced distal femoral diametaphysis fracture with posterior  displacement of the distal femoral fragment a full bone width. The  distal portion of the fracture extends to the upper edge of the femoral  component anteriorly. The fracture planes do not appear to extend beyond  that level. The patella, proximal tibia and fibula are intact. There is  no evidence of underlying bone lesion.     The significant posterior displacement of the knee relative to the  proximal femur fracture fragment significantly deforms to the quadriceps  tendon bundle but that structure appears to remain intact.     Impression: Comminuted distal left femoral diametaphysis fracture  extends to the level of the femoral component anteriorly along the  midline. The remainder of the fracture appears to terminate at the level  of the metaphysis.     This report was finalized on 10/24/2023 11:35 AM by Dr. Arben Clemons M.D on Workstation: [id number]     XR Chest 1 View, XR Hip With or Without Pelvis 2 - 3 View Left, XR Femur 2 View Left  Narrative: XR CHEST 1 VW-, XR FEMUR 2 VW LEFT-, XR HIP W OR WO PELVIS 2-3 VIEW  LEFT-     HISTORY: Female who is 58 years-old, trauma     TECHNIQUE: Frontal view of the chest,  3 views of the left femur, frontal  view of the pelvis, 2 views of the left hip     COMPARISON: None available     FINDINGS:     Chest x-ray: Heart, mediastinum and pulmonary vasculature are  unremarkable. No focal pulmonary consolidation, pleural effusion, or  pneumothorax. No acute osseous process.     Pelvis, left hip, left femur: A comminuted, angulated, displaced  fracture involving the distal left femoral metaphysis is noted, just  above the left knee arthroplasty hardware. The main distal fracture  fragment is posteriorly displaced by about 1 shaft width. No other  fractures are identified. No dislocation. Hip joint spaces appear  preserved.     Impression: As described.     This report was finalized on 10/24/2023 7:01 AM by Dr. Irwin Skinner M.D on Workstation: [id number]       Scheduled Medications  amLODIPine, 10 mg, Oral, Q24H  [MAR Hold] ethyl alcohol, 2 swab , Nasal, Once  [MAR Hold] insulin lispro, 2-7 Units, Subcutaneous, 4x Daily AC & at Bedtime  [MAR Hold] rosuvastatin, 40 mg, Oral, Daily  [MAR Hold] senna-docusate sodium, 2 tablet, Oral, BID  [MAR Hold] sodium chloride, 10 mL, Intravenous, Q12H  sodium chloride, 3 mL, Intravenous, Q12H    Infusions  lactated ringers, 9 mL/hr, Last Rate: 100 mL/hr (10/25/23 1255)  sodium chloride, 100 mL/hr, Last Rate: Stopped (10/25/23 1139)    Diet  NPO Diet NPO Type: Strict NPO      Assessment/Plan     Active Hospital Problems    Diagnosis  POA    **Closed displaced comminuted fracture of shaft of left femur [S72.352A]  Yes    HTN (hypertension) [I10]  Yes    Type 2 diabetes mellitus, without long-term current use of insulin [E11.9]  Yes    HLD (hyperlipidemia) [E78.5]  Yes    Obesity (BMI 30-39.9) [E66.9]  Yes      Resolved Hospital Problems   No resolved problems to display.       58 y.o. female admitted with Closed displaced comminuted fracture of shaft of left femur.    Acute kidney injury  Most likely due to hypovolemia.  Start IV fluids    Left femur  fracture from mechanical fall  History of bilateral knee replacements  -Ortho consulted-plan for OR tody  -Oral and IV pain meds     Diabetes type 2, A1c 9%  -Hold home Jardiance and metformin  -Low-dose sliding scale      Hypertension  -Continue home amlodipine 10 mg, may need additional agents during hospitalization if blood pressures remain uncontrolled     Hyperlipidemia-continue home statin    SCDs for DVT prophylaxis.  Full code.  Discussed with patient and nursing staff.  Anticipate discharge home with  vs SNU facility in 2-3 days.      Mathew Castro MD  [facility]  10/25/23  13:50 EDT    Copied text on this note has been reviewed and updated as of 10/25/23        Electronically signed by Mathew Castro MD at 10/25/23 1352       Anaya, SONA Nguyen at 10/25/23 0606       Attestation signed by Thong Torres MD at 10/25/23 1250    I have reviewed this documentation and agree.  Risks and benefits of surgical intervention were discussed in detail with the patient.  Risk of infection, DVT, PE, malunion, nonunion, possible need for additional surgery, loss of limb and loss of life were all discussed.  Questions answered to her satisfaction.  She voiced questions and verbalized understanding of the risks and benefits of the proposed surgical procedure.  She has agreed to proceed as discussed.    Thong Torres MD                       Orthopedic Progress Note    Subjective :   Patient seen and examined.  She is resting comfortably in her hospital bed.  No acute issues overnight.  Hemoglobin A1c was performed yesterday.  Hemoglobin A1c was 9.0.  CT was performed as well.  Patient currently n.p.o. in anticipation for surgery.    Objective :    Vital signs in last 24 hours:  Temp:  [97.1 °F (36.2 °C)-97.7 °F (36.5 °C)] 97.5 °F (36.4 °C)  Heart Rate:  [69-87] 74  Resp:  [16-24] 16  BP: (130-215)/() 135/65  Vitals:    10/24/23 1847 10/24/23 2113 10/25/23 0105 10/25/23 0558   BP: 147/78 150/76  130/70 135/65   BP Location: Right arm Right arm Right arm Right arm   Patient Position: Lying Lying Lying Lying   Pulse: 85 82 75 74   Resp: 24 18 16 16   Temp: 97.5 °F (36.4 °C) 97.3 °F (36.3 °C) 97.1 °F (36.2 °C) 97.5 °F (36.4 °C)   TempSrc: Oral Oral Oral Oral   SpO2: 100% 96% 96% 95%   Weight:       Height:           PHYSICAL EXAM:  Focused physical examination of the patient's left lower extremity was performed.  Patient's resting comfortably in her hospital bed.  She is in no acute distress.  Patient's left lower extremity is shortened.  It is externally rotated.  Previous incision over the left knee is well-healed.  No surrounding erythema.  No appreciable skin lesions.  No skin breakdown.  Range of motion of the left lower extremity was not assessed given the patient's fracture.  Her compartments are soft.  Her sensation is intact distally.  Foot warm and well-perfused.  EHL, FHL, TA, and GS are intact.  DP/TP are 2+.     LABS:  Results from last 7 days   Lab Units 10/24/23  0626   WBC 10*3/mm3 6.97   HEMOGLOBIN g/dL 13.4   HEMATOCRIT % 41.0   PLATELETS 10*3/mm3 232     Results from last 7 days   Lab Units 10/24/23  0756   SODIUM mmol/L 139   POTASSIUM mmol/L 4.4   CHLORIDE mmol/L 105   CO2 mmol/L 24.0   BUN mg/dL 24*   CREATININE mg/dL 1.00   GLUCOSE mg/dL 208*   CALCIUM mg/dL 9.6     Results from last 7 days   Lab Units 10/24/23  0856   INR  1.00     Study Result    Narrative & Impression   CT LEFT FEMUR WITHOUT CONTRAST     HISTORY: Evaluate periprosthetic distal femur fracture.     TECHNIQUE: Axial noncontrast left femur CT from proximal to the mid  femoral shaft level to below the knee is provided and correlated with  femur and hip x-rays from earlier this morning. Radiation dose reduction  techniques were utilized, including automated exposure control and  exposure modulation based on body size.     FINDINGS: There is a total knee arthroplasty hardware and a comminuted,  displaced distal femoral  diametaphysis fracture with posterior  displacement of the distal femoral fragment a full bone width. The  distal portion of the fracture extends to the upper edge of the femoral  component anteriorly. The fracture planes do not appear to extend beyond  that level. The patella, proximal tibia and fibula are intact. There is  no evidence of underlying bone lesion.     The significant posterior displacement of the knee relative to the  proximal femur fracture fragment significantly deforms to the quadriceps  tendon bundle but that structure appears to remain intact.     IMPRESSION:  Comminuted distal left femoral diametaphysis fracture  extends to the level of the femoral component anteriorly along the  midline. The remainder of the fracture appears to terminate at the level  of the metaphysis.     This report was finalized on 10/24/2023 11:35 AM by Dr. Arben Clemons M.D on Workstation: [id number]       ASSESSMENT:   Closed displaced comminuted fracture of shaft of left femur     Plan:  I did have an extensive discussion with the patient regards her situation in the hospital today.  I have reviewed the above.  CT scan has been reviewed.  CT scan will be reviewed by my supervising physician.  Patient currently n.p.o. at this time in anticipation for surgery today.  Plan for left intramedullary retrograde nailing versus open reduction internal fixation of her distal femur.  Patient is on the operative schedule for this afternoon.  Please continue to be n.p.o. at this time.  Hold any anticoagulation medication in anticipation for surgery.    Thank you very much for allowing us to be a part of the patient's care.  Further recommendations to follow throughout her hospital course as well as postoperatively.    Patrick Anaya PA-C    Date: 10/25/2023  Time: 06:06 EDT    Electronically signed by Thong Torres MD at 10/25/23 7907

## 2023-10-26 NOTE — CASE MANAGEMENT/SOCIAL WORK
Continued Stay Note  [facility]     Patient Name: Sasha Knapp  MRN: [id number]  Today's Date: 10/26/2023    Admit Date: 10/24/2023    Plan: [place] PENDING [place] precert   Discharge Plan       Row Name 10/26/23 1125       Plan    Plan [place] PENDING [place] precert    Plan Comments S/W Erica/ [place] - they can accept PENDING precert.  S/W pt who is in agreement w/ [facility].  [place] will initiate precert today, await determination. ........Divine DIANA/ [facility]                   Discharge Codes    No documentation.                 Expected Discharge Date and Time       Expected Discharge Date Expected Discharge Time    Oct 26, 2023               Divine Meléndez RN

## 2023-10-26 NOTE — PLAN OF CARE
Goal Outcome Evaluation:  Plan of Care Reviewed With: patient           Outcome Evaluation: Dressing saturated with bloody drainage. Dressing changed per Dr. Torres recommendation; numerous 4x4s covered with ABDs and wrapped with ACE. PRN pain medication utilized per order. VSS. Plan to transfer to ortho floor. Will continue to monitor.

## 2023-10-26 NOTE — PLAN OF CARE
Goal Outcome Evaluation:  Plan of Care Reviewed With: patient           Outcome Evaluation: Pt is a 59 yo F who is post-op L hip ORIF s/p fall down steps with fracture. Pt is to maintain NWB on LLE. Pt presents to PT with impaired functional mobility and gait secondary to L LE pain, weakness, and decreased activity tolerance. PT was able to maintain NWB on LLE with repeated cues when ambulating in room. Pt has extensive history of B LE surgery and lives a lone in a home with many stairs. Pt may benefit from skilled PT to address strength, mobility, and gait. PT may benefit from SNF placement upon discharge.      Anticipated Discharge Disposition (PT): skilled nursing facility

## 2023-10-26 NOTE — PLAN OF CARE
Goal Outcome Evaluation:  Plan of Care Reviewed With: patient           Outcome Evaluation: Pt admit with fall down stairs, 10/25 L femur nailing and NWB.  Pt lives alone and is independent and working at baseline.  Pt has h/o multiple knee surgeries. Pt presents up in bed, with cueing and set up is able to move to EOB and then xfer to chair with walker.   Pt is able to adhere to NWB  but moving quickly and concern for LOB.  Pt in KI to L LE and would need assist with lower body dressing tasks.  Anticipate assist with most lower body ADL tasks.  Pt strengths include B UE strength.  Will cont to follow for skilled OT and recommend rehab at d/c as pt lives alone and is NWB.      Anticipated Discharge Disposition (OT): skilled nursing facility, inpatient rehabilitation facility

## 2023-10-26 NOTE — DISCHARGE INSTRUCTIONS
OK to remove knee immobilizer when in bed or chair.  Ok to perform ROM exercises when in bed or chair.  To wear knee immobilizer when performing transfers and ambulating.   Change dressing in 1 week or sooner if needed if dressing falling off or saturated.  Keep incisions covered until staple removal in office.

## 2023-10-26 NOTE — NURSING NOTE
Checked new dressing, small amount of bloody drainage noted and charted. Plan veronica change dressing before end of shift.

## 2023-10-26 NOTE — PROGRESS NOTES
Procedure(s):  LEFT FEMUR INTRAMEDULLARY NAILING RETROGRADE     LOS: 2 days     Subjective :   Patient seen and examined.  Resting comfortably.  Alert, responding appropriately to questions.  Pain controlled.  Denies any new problems overnight.      Objective :    Vital signs in last 24 hours:  Vitals:    10/25/23 1700 10/25/23 1715 10/25/23 1900 10/25/23 2300   BP: 177/69 163/75 167/88 168/86   BP Location:  Right arm Right arm Right arm   Patient Position:  Lying Lying Lying   Pulse: 84 86 95 104   Resp: 16 16 16 16   Temp:  98 °F (36.7 °C) 98.4 °F (36.9 °C) 98.6 °F (37 °C)   TempSrc:  Oral Oral Oral   SpO2: 98% 99% 98% 99%   Weight:       Height:           PHYSICAL EXAM:  Patient is calm, in no acute distress, awake and oriented x 3.  Dressing is clean, dry and intact.  No signs of infection.  Swelling is appropriate in amount.  Ecchymosis is appropriate in amount.  EHL, FHL, TA, GS intact.  Patient is neurovascularly intact distally.    LABS:  Results from last 7 days   Lab Units 10/26/23  0509 10/25/23  2110 10/25/23  0621   WBC 10*3/mm3  --   --  6.97   HEMOGLOBIN g/dL 11.2*   < > 10.9*   HEMATOCRIT % 34.0   < > 33.6*   PLATELETS 10*3/mm3  --   --  229    < > = values in this interval not displayed.     Results from last 7 days   Lab Units 10/26/23  0509   SODIUM mmol/L 135*   POTASSIUM mmol/L 4.8   CHLORIDE mmol/L 105   CO2 mmol/L 21.0*   BUN mg/dL 35*   CREATININE mg/dL 1.19*   GLUCOSE mg/dL 225*   CALCIUM mg/dL 8.7     Results from last 7 days   Lab Units 10/24/23  0856   INR  1.00         ASSESSMENT:  Status post Procedure(s):  LEFT FEMUR INTRAMEDULLARY NAILING RETROGRADE      Plan:  Continue Physical Therapy, increase mobility  NWB to the operative extremity    Continue SCDs & DVT prophylaxis  Aspirin 81 mg BID for VTE chemoprophylaxis    Surgical dressings to be left in place x1 week.  Then can be removed and replaced with daily dry dressings.  Okay to shower.  Do not submerge incisions.    Dispo  planning per primary team for home with home health and family assistance versus SNF, depending on PT eval and patient safety needs    Plan for routine postoperative follow-up in our office in 2 weeks for x-ray, wound assessment, and staple removal.    Plan of care above was discussed with the patient at bedside this morning.  She stated understanding and was agreeable.  She was given opportunity ask questions and all questions were answered to her satisfaction.    Rashad Reza, APRN    Date: 10/26/2023  Time: 07:02 EDT

## 2023-10-26 NOTE — THERAPY EVALUATION
Patient Name: Sasha Knapp  : 1965    MRN: [id number]                              Today's Date: 10/26/2023       Admit Date: 10/24/2023    Visit Dx:     ICD-10-CM ICD-9-CM   1. Closed displaced comminuted fracture of shaft of left femur, initial encounter  S72.352A 821.01     Patient Active Problem List   Diagnosis    Closed displaced comminuted fracture of shaft of left femur    HTN (hypertension)    Type 2 diabetes mellitus, without long-term current use of insulin    HLD (hyperlipidemia)    Obesity (BMI 30-39.9)     Past Medical History:   Diagnosis Date    Diabetes mellitus     Hyperlipidemia     Hypertension     Sleep apnea      Past Surgical History:   Procedure Laterality Date    CARPAL TUNNEL RELEASE Left     HYSTERECTOMY  2000    REPLACEMENT TOTAL KNEE BILATERAL Bilateral       General Information       Row Name 10/26/23 1403          OT Time and Intention    Document Type evaluation;therapy note (daily note)  2 attempts to see pt.  first attempt pt asking OT to return so pt can eat lunch and visit with brother from out of town.  -LE     Mode of Treatment individual therapy;occupational therapy  -       Row Name 10/26/23 1403          General Information    Patient Profile Reviewed yes  -LE     Prior Level of Function independent:;transfer;gait;driving;work  walker/cane level.  works as shopping order fulfiller.  -LE     Existing Precautions/Restrictions fall;brace worn when out of bed;non-weight bearing  KI and NWB L LE.  -LE     Barriers to Rehab medically complex;previous functional deficit  -       Row Name 10/26/23 1403          Living Environment    People in Home alone  -       Row Name 10/26/23 1403          Cognition    Orientation Status (Cognition) oriented x 4  talkative, redirection at times  -       Row Name 10/26/23 1403          Safety Issues, Functional Mobility    Safety Issues Affecting Function (Mobility) impulsivity  -LE     Impairments Affecting Function  (Mobility) endurance/activity tolerance;pain;balance  -LE     Comment, Safety Issues/Impairments (Mobility) non skid socks and gait belt.  -LE               User Key  (r) = Recorded By, (t) = Taken By, (c) = Cosigned By      Initials Name Provider Type    Kerry Gomez OTR Occupational Therapist                     Mobility/ADL's       Row Name 10/26/23 1406          Bed Mobility    Bed Mobility supine-sit  -LE     Supine-Sit [place] (Bed Mobility) contact guard;verbal cues;set up  -LE     Bed Mobility, Safety Issues decreased use of legs for bridging/pushing  -LE     Assistive Device (Bed Mobility) bed rails;head of bed elevated  -LE       Row Name 10/26/23 1406          Transfers    Transfers sit-stand transfer;stand-sit transfer;bed-chair transfer  -LE     Comment, (Transfers) discussion on benefit of sitting in chair.  pt uncertain about getting to chair as visitors planned, OT ed nsg can get additional chair as needed. once in chair agreeable to staying up.  ed pt and RN on option for stand/squat pivot on R LE for return to bed if nsg does not have a walker.  -LE       Row Name 10/26/23 1406          Bed-Chair Transfer    Bed-Chair [place] (Transfers) contact guard;verbal cues  -LE     Assistive Device (Bed-Chair Transfers) walker, front-wheeled  -LE     Comment, (Bed-Chair Transfer) cues to slow on turns, pt picks up walker off floor at one point.  -LE       Row Name 10/26/23 1406          Sit-Stand Transfer    Sit-Stand [place] (Transfers) contact guard;verbal cues;nonverbal cues (demo/gesture)  -LE     Assistive Device (Sit-Stand Transfers) walker, front-wheeled  -LE       Row Name 10/26/23 1406          Stand-Sit Transfer    Stand-Sit [place] (Transfers) verbal cues;nonverbal cues (demo/gesture);contact guard  -LE     Assistive Device (Stand-Sit Transfers) walker, front-wheeled  -LE       Row Name 10/26/23 1406          Functional Mobility    Functional Mobility- Comment defer, focus  on xfer to chair.  -LE       Row Name 10/26/23 1406          Activities of Daily Living    BADL Assessment/Intervention toileting;feeding;grooming;lower body dressing;upper body dressing;bathing  -       Row Name 10/26/23 1406          Mobility    Extremity Weight-bearing Status left lower extremity  -LE     Left Lower Extremity (Weight-bearing Status) non weight-bearing (NWB)  -       Row Name 10/26/23 1406          Toileting Assessment/Training    [place] Level (Toileting) dependent (less than 25% patient effort)  -LE     Comment, (Toileting) kayla.  recommend use of BSC with nsg.  -       Row Name 10/26/23 1406          Self-Feeding Assessment/Training    [place] Level (Feeding) feeding skills;modified independence  -LE     Position (Self-Feeding) sitting up in bed  -       Row Name 10/26/23 1406          Lower Body Dressing Assessment/Training    Comment, (Lower Body Dressing) pt usually pulls legs up to bed to reach feet for LBD.  Pt makes attempt in chair but unable to fully reach R sock and now limited L LE due to KI.  -LE               User Key  (r) = Recorded By, (t) = Taken By, (c) = Cosigned By      Initials Name Provider Type    Kerry Gomez OTR Occupational Therapist                   Obj/Interventions       Row Name 10/26/23 1412          Sensory Assessment (Somatosensory)    Sensory Assessment (Somatosensory) UE sensation intact  -       Row Name 10/26/23 1412          Range of Motion Comprehensive    General Range of Motion bilateral upper extremity ROM WFL  -       Row Name 10/26/23 1412          Strength Comprehensive (MMT)    Comment, General Manual Muscle Testing (MMT) Assessment B UE 4+/5  -LE       Row Name 10/26/23 1412          Balance    Balance Assessment sitting static balance;standing static balance;standing dynamic balance  -LE     Static Sitting Balance standby assist  -LE     Position, Sitting Balance sitting edge of bed;sitting in chair  -LE     Static  Standing Balance contact guard;verbal cues;set-up  -LE     Position/Device Used, Standing Balance walker, rolling  -LE     Balance Interventions standing;supported;static  -LE     Comment, Balance cues to slow down as pt moving quickly.  -LE               User Key  (r) = Recorded By, (t) = Taken By, (c) = Cosigned By      Initials Name Provider Type    Kerry Gomez OTR Occupational Therapist                   Goals/Plan       Row Name 10/26/23 1415          Transfer Goal 1 (OT)    Activity/Assistive Device (Transfer Goal 1, OT) sit-to-stand/stand-to-sit;bed-to-chair/chair-to-bed;toilet;walker, rolling;commode, 3-in-1  -LE     [place] Level/Cues Needed (Transfer Goal 1, OT) standby assist  -LE     Time Frame (Transfer Goal 1, OT) 2 weeks  -LE     Progress/Outcome (Transfer Goal 1, OT) goal ongoing  -LE       Row Name 10/26/23 1415          Dressing Goal 1 (OT)    Activity/Device (Dressing Goal 1, OT) lower body dressing;reacher;sock-aid;long-handled shoe horn  -LE     [place]/Cues Needed (Dressing Goal 1, OT) minimum assist (75% or more patient effort);verbal cues required  -LE     Time Frame (Dressing Goal 1, OT) 2 weeks  -LE     Progress/Outcome (Dressing Goal 1, OT) goal ongoing  -LE       Row Name 10/26/23 1415          Toileting Goal 1 (OT)    Activity/Device (Toileting Goal 1, OT) toileting skills, all  -LE     [place] Level/Cues Needed (Toileting Goal 1, OT) minimum assist (75% or more patient effort);set-up required;supervision required  -LE     Time Frame (Toileting Goal 1, OT) 2 weeks  -LE     Progress/Outcome (Toileting Goal 1, OT) goal ongoing  -LE       Row Name 10/26/23 1415          Problem Specific Goal 1 (OT)    Problem Specific Goal 1 (OT) CGA/SBA ambulation to/from bathroom with walker and adherence to NWB L LE.  -LE     Time Frame (Problem Specific Goal 1, OT) 2 weeks  -LE     Progress/Outcome (Problem Specific Goal 1, OT) goal ongoing  -LE       Row Name 10/26/23 1415           Therapy Assessment/Plan (OT)    Planned Therapy Interventions (OT) activity tolerance training;adaptive equipment training;BADL retraining;occupation/activity based interventions;patient/caregiver education/training;transfer/mobility retraining;functional balance retraining  -LE               User Key  (r) = Recorded By, (t) = Taken By, (c) = Cosigned By      Initials Name Provider Type    Kerry Gomez OTR Occupational Therapist                   Clinical Impression       Row Name 10/26/23 1413          Pain Assessment    Pretreatment Pain Rating 4/10  -LE     Posttreatment Pain Rating 4/10  -LE     Pain Location - Side/Orientation Left  -LE     Pain Location lower  -LE     Pain Location - extremity  -LE     Pain Intervention(s) Repositioned;Emotional support;Rest  -LE       Row Name 10/26/23 1413          Plan of Care Review    Plan of Care Reviewed With patient  -LE     Outcome Evaluation Pt admit with fall down stairs, 10/25 L femur nailing and NWB.  Pt lives alone and is independent and working at baseline.  Pt has h/o multiple knee surgeries. Pt presents up in bed, with cueing and set up is able to move to EOB and then xfer to chair with walker.   Pt is able to adhere to NWB  but moving quickly and concern for LOB.  Pt in KI to L LE and would need assist with lower body dressing tasks.  Anticipate assist with most lower body ADL tasks.  Pt strengths include B UE strength.  Will cont to follow for skilled OT and recommend rehab at d/c as pt lives alone and is NWB.  -LE       Row Name 10/26/23 1413          Therapy Assessment/Plan (OT)    Patient/Family Therapy Goal Statement (OT) return to ability to take care of self  -LE     Rehab Potential (OT) good, to achieve stated therapy goals  -LE     Criteria for Skilled Therapeutic Interventions Met (OT) meets criteria;yes  -LE     Therapy Frequency (OT) 5 times/wk  -LE       Row Name 10/26/23 1413          Therapy Plan Review/Discharge Plan (OT)    Equipment  Needs Upon Discharge (OT) --  recommend walker, BSC, shower chair.   may benefit from AE for LBD.  -LE     Anticipated Discharge Disposition (OT) skilled nursing facility;inpatient rehabilitation facility  -LE       Row Name 10/26/23 1413          Vital Signs    O2 Delivery Intra Treatment room air  -LE     Pre Patient Position Supine  -LE     Intra Patient Position Standing  -LE     Post Patient Position Sitting  -LE       Row Name 10/26/23 1413          Positioning and Restraints    Pre-Treatment Position in bed  -LE     Post Treatment Position chair  -LE     In Chair notified nsg;reclined;call light within reach;encouraged to call for assist;exit alarm on  L LE KI in place.  -LE               User Key  (r) = Recorded By, (t) = Taken By, (c) = Cosigned By      Initials Name Provider Type    Kerry Gomez OTR Occupational Therapist                   Outcome Measures       Row Name 10/26/23 1417          How much help from another is currently needed...    Putting on and taking off regular lower body clothing? 2  -LE     Bathing (including washing, rinsing, and drying) 2  -LE     Toileting (which includes using toilet bed pan or urinal) 1  -LE     Putting on and taking off regular upper body clothing 3  -LE     Taking care of personal grooming (such as brushing teeth) 3  -LE     Eating meals 4  -LE     AM-PAC 6 Clicks Score (OT) 15  -LE       Row Name 10/26/23 1023 10/26/23 0845       How much help from another person do you currently need...    Turning from your back to your side while in flat bed without using bedrails? 3  -CH 2  -LB    Moving from lying on back to sitting on the side of a flat bed without bedrails? 3  -CH 2  -LB    Moving to and from a bed to a chair (including a wheelchair)? 3  -CH 1  -LB    Standing up from a chair using your arms (e.g., wheelchair, bedside chair)? 3  -CH 1  -LB    Climbing 3-5 steps with a railing? 1  -CH 1  -LB    To walk in hospital room? 2  -CH 1  -LB    AM-PAC 6 Clicks  Score (PT) 15  -CH 8  -LB    Highest level of mobility 4 --> Transferred to chair/commode  -CH 3 --> Sat at edge of bed  -LB      Row Name 10/26/23 1417 10/26/23 1023       Functional Assessment    Outcome Measure Options AM-PAC 6 Clicks Daily Activity (OT)  -LE AM-PAC 6 Clicks Basic Mobility (PT)  -CH              User Key  (r) = Recorded By, (t) = Taken By, (c) = Cosigned By      Initials Name Provider Type    Kerry Gomez OTR Occupational Therapist    Meka Huffman, PT Physical Therapist    Lani Metcalf, RN Registered Nurse                    Occupational Therapy Education       Title: PT OT SLP Therapies (In Progress)       Topic: Occupational Therapy (In Progress)       Point: ADL training (Done)       Description:   Instruct learner(s) on proper safety adaptation and remediation techniques during self care or transfers.   Instruct in proper use of assistive devices.                  Learning Progress Summary             Patient Acceptance, E, Bed IU by KHUSHI at 10/26/2023 1417                         Point: Home exercise program (Not Started)       Description:   Instruct learner(s) on appropriate technique for monitoring, assisting and/or progressing therapeutic exercises/activities.                  Learner Progress:  Not documented in this visit.              Point: Precautions (Done)       Description:   Instruct learner(s) on prescribed precautions during self-care and functional transfers.                  Learning Progress Summary             Patient Acceptance, E, Bed IU by KHUSHI at 10/26/2023 1417                         Point: Body mechanics (Done)       Description:   Instruct learner(s) on proper positioning and spine alignment during self-care, functional mobility activities and/or exercises.                  Learning Progress Summary             Patient Acceptance, E, Bed IU by KHUSHI at 10/26/2023 1417                                         User Key       Initials Effective Dates Name  Provider Type Discipline    LE 06/16/21 -  Kerry Shannon OTR Occupational Therapist OT                  OT Recommendation and Plan  Planned Therapy Interventions (OT): activity tolerance training, adaptive equipment training, BADL retraining, occupation/activity based interventions, patient/caregiver education/training, transfer/mobility retraining, functional balance retraining  Therapy Frequency (OT): 5 times/wk  Plan of Care Review  Plan of Care Reviewed With: patient  Outcome Evaluation: Pt admit with fall down stairs, 10/25 L femur nailing and NWB.  Pt lives alone and is independent and working at baseline.  Pt has h/o multiple knee surgeries. Pt presents up in bed, with cueing and set up is able to move to EOB and then xfer to chair with walker.   Pt is able to adhere to NWB  but moving quickly and concern for LOB.  Pt in KI to L LE and would need assist with lower body dressing tasks.  Anticipate assist with most lower body ADL tasks.  Pt strengths include B UE strength.  Will cont to follow for skilled OT and recommend rehab at d/c as pt lives alone and is NWB.     Time Calculation:   Evaluation Complexity (OT)  Review Occupational Profile/Medical/Therapy History Complexity: expanded/moderate complexity  Assessment, Occupational Performance/Identification of Deficit Complexity: 3-5 performance deficits  Clinical Decision Making Complexity (OT): detailed assessment/moderate complexity  Overall Complexity of Evaluation (OT): moderate complexity     Time Calculation- OT       Row Name 10/26/23 1418 10/26/23 1024          Time Calculation- OT    OT Start Time 1327  -LE --     OT Stop Time 1354  prior attempt 1250 explain role of OT, plan to return per pt request.  -LE --     OT Time Calculation (min) 27 min  -LE --     Total Timed Code Minutes- OT 10 minute(s)  -LE --     OT Received On 10/26/23  -LE --     OT - Next Appointment 10/27/23  -LE --     OT Goal Re-Cert Due Date 11/09/23  -LE --        Timed Charges     09369 - Gait Training Minutes  -- 15  -     73210 - OT Self Care/Mgmt Minutes 10  -LE --        Total Minutes    Timed Charges Total Minutes 10  -LE 15  -CH      Total Minutes 10  -LE 15  -               User Key  (r) = Recorded By, (t) = Taken By, (c) = Cosigned By      Initials Name Provider Type    Kerry Gomez OTR Occupational Therapist    CH Meka Pandya, PT Physical Therapist                  Therapy Charges for Today       Code Description Service Date Service Provider Modifiers Qty    [id number]  OT SELF CARE/MGMT/TRAIN EA 15 MIN 10/26/2023 Kerry Shannon OTR GO 1    [id number]  OT EVAL MOD COMPLEXITY 3 10/26/2023 Kerry Shannon OTR GO 1                 MADISON Smith  10/26/2023

## 2023-10-26 NOTE — PROGRESS NOTES
Name: Sasha Knapp ADMIT: 10/24/2023   : 1965  PCP: Provider, No Known    MRN: [id number] LOS: 2 days   AGE/SEX: 58 y.o. female  ROOM: [facility]     Subjective     Creatinine improved with IV fluids.  She was concerned that her incision was bloody.  Also requested her Lin[place].  Pain is adequately controlled.  Objective   Objective   Vital Signs  Temp:  [97.9 °F (36.6 °C)-98.6 °F (37 °C)] 98.6 °F (37 °C)  Heart Rate:  [] 96  Resp:  [16-18] 18  BP: (152-180)/(67-99) 163/67  SpO2:  [94 %-100 %] 99 %  on  Flow (L/min):  [0-3] 2;   Device (Oxygen Therapy): nasal cannula  Body mass index is 32.27 kg/m².  Physical Exam  Constitutional:       General: She is not in acute distress.  HENT:      Head: Normocephalic.   Eyes:      Extraocular Movements: Extraocular movements intact.      Pupils: Pupils are equal, round, and reactive to light.   Cardiovascular:      Rate and Rhythm: Normal rate and regular rhythm.   Pulmonary:      Effort: Pulmonary effort is normal. No respiratory distress.   Abdominal:      General: There is no distension.      Palpations: Abdomen is soft.      Tenderness: There is no abdominal tenderness.   Musculoskeletal:      Comments: Left lower extremity bandage with sanguinous seepage   Neurological:      Mental Status: She is alert and oriented to person, place, and time.         Results Review     I reviewed the patient's new clinical results.  Results from last 7 days   Lab Units 10/26/23  0509 10/25/23  2110 10/25/23  0621 10/24/23  06   WBC 10*3/mm3  --   --  6.97 6.97   HEMOGLOBIN g/dL 11.2* 12.2 10.9* 13.4   PLATELETS 10*3/mm3  --   --  229 232     Results from last 7 days   Lab Units 10/26/23  0509 10/25/23  0621 10/24/23  0756   SODIUM mmol/L 135* 133* 139   POTASSIUM mmol/L 4.8 4.4 4.4   CHLORIDE mmol/L 105 105 105   CO2 mmol/L 21.0* 21.9* 24.0   BUN mg/dL 35* 33* 24*   CREATININE mg/dL 1.19* 1.34* 1.00   GLUCOSE mg/dL 225* 191* 208*   Estimated Creatinine Clearance: 54.4  "mL/min (A) (by C-G formula based on SCr of 1.19 mg/dL (H)).  Results from last 7 days   Lab Units 10/24/23  0756   ALBUMIN g/dL 4.1   BILIRUBIN mg/dL 0.5   ALK PHOS U/L 97   AST (SGOT) U/L 21   ALT (SGPT) U/L 17     Results from last 7 days   Lab Units 10/26/23  0509 10/25/23  0621 10/24/23  0756   CALCIUM mg/dL 8.7 8.7 9.6   ALBUMIN g/dL  --   --  4.1     No results found for: \"COVID19\"  Hemoglobin A1C   Date/Time Value Ref Range Status   10/24/2023 0626 9.00 (H) 4.80 - 5.60 % Final     Glucose   Date/Time Value Ref Range Status   10/26/2023 1147 225 (H) 70 - 130 mg/dL Final   10/26/2023 0618 198 (H) 70 - 130 mg/dL Final   10/25/2023 2200 379 (H) 70 - 130 mg/dL Final   10/25/2023 1523 197 (H) 70 - 130 mg/dL Final   10/25/2023 1208 132 (H) 70 - 130 mg/dL Final   10/25/2023 0746 206 (H) 70 - 130 mg/dL Final   10/24/2023 2105 146 (H) 70 - 130 mg/dL Final     No results found for this or any previous visit.      XR Femur 2 View Left  Narrative: LEFT FEMUR OPERATIVE X-RAY     HISTORY: Femur fracture repair.     Operative imaging provided for Dr. Ziegler during femur fracture repair.  The saved images show installation of a retrograde nail with proximal  and distal interlocking screws. Left knee arthroplasty hardware is  noted.     mGy 18    SECONDS   5 IMAGES      Impression: Operative imaging provided for Dr. Ziegler during repair of a  left femur periprosthetic fracture.     This report was finalized on 10/25/2023 4:06 PM by Dr. Arben Clemons M.D on Workstation: [id number]     FL C Arm During Surgery  This procedure was auto-finalized with no dictation required.    Scheduled Medications  amLODIPine, 10 mg, Oral, Daily  aspirin, 81 mg, Oral, Q12H  ethyl alcohol, 2 swab , Nasal, Once  famotidine, 40 mg, Oral, Daily  insulin lispro, 2-7 Units, Subcutaneous, 4x Daily AC & at Bedtime  rosuvastatin, 40 mg, Oral, Daily  senna-docusate sodium, 2 tablet, Oral, BID  sodium chloride, 10 mL, Intravenous, Q12H  valsartan, " 320 mg, Oral, Daily    Infusions  sodium chloride, 100 mL/hr, Last Rate: 100 mL/hr (10/26/23 0941)    Diet  Diet: Diabetic Diets; Consistent Carbohydrate; Texture: Regular Texture (IDDSI 7); Fluid Consistency: Thin (IDDSI 0)       Assessment/Plan     Active Hospital Problems    Diagnosis  POA    **Closed displaced comminuted fracture of shaft of left femur [S72.352A]  Yes    HTN (hypertension) [I10]  Yes    Type 2 diabetes mellitus, without long-term current use of insulin [E11.9]  Yes    HLD (hyperlipidemia) [E78.5]  Yes    Obesity (BMI 30-39.9) [E66.9]  Yes      Resolved Hospital Problems   No resolved problems to display.       58 y.o. female admitted with Closed displaced comminuted fracture of shaft of left femur.    Acute kidney injury  Improving with IV fluids.    Left femur fracture from mechanical fall  History of bilateral knee replacements  MN the left distal femur  -Oral and IV pain meds     Diabetes type 2  -Hold home Jardiance and metformin  -A1c 9.0.  Starting Lantus 20 units nightly along with lispro 5 units 3 times daily AC plus sliding scale    Hypertension  -Continue home amlodipine 10 mg.  ARB restarted     Hyperlipidemia-continue home statin    SCDs for DVT prophylaxis.  Full code.  Discussed with patient and nursing staff.  Anticipate discharge home with HH vs SNU facility in 2-3 days.      Mathew Castro MD  [facility]  10/26/23  15:15 EDT    Copied text on this note has been reviewed and updated as of 10/26/23

## 2023-10-27 LAB
ANION GAP SERPL CALCULATED.3IONS-SCNC: 6.6 MMOL/L (ref 5–15)
BUN SERPL-MCNC: 27 MG/DL (ref 6–20)
BUN/CREAT SERPL: 27.3 (ref 7–25)
CALCIUM SPEC-SCNC: 8.7 MG/DL (ref 8.6–10.5)
CHLORIDE SERPL-SCNC: 105 MMOL/L (ref 98–107)
CO2 SERPL-SCNC: 24.4 MMOL/L (ref 22–29)
CREAT SERPL-MCNC: 0.99 MG/DL (ref 0.57–1)
EGFRCR SERPLBLD CKD-EPI 2021: 66.2 ML/MIN/1.73
GLUCOSE BLDC GLUCOMTR-MCNC: 154 MG/DL (ref 70–130)
GLUCOSE BLDC GLUCOMTR-MCNC: 164 MG/DL (ref 70–130)
GLUCOSE BLDC GLUCOMTR-MCNC: 191 MG/DL (ref 70–130)
GLUCOSE BLDC GLUCOMTR-MCNC: 291 MG/DL (ref 70–130)
GLUCOSE SERPL-MCNC: 169 MG/DL (ref 65–99)
HCT VFR BLD AUTO: 32.1 % (ref 34–46.6)
HGB BLD-MCNC: 10.6 G/DL (ref 12–15.9)
POTASSIUM SERPL-SCNC: 4.2 MMOL/L (ref 3.5–5.2)
SODIUM SERPL-SCNC: 136 MMOL/L (ref 136–145)

## 2023-10-27 PROCEDURE — 85014 HEMATOCRIT: CPT | Performed by: ORTHOPAEDIC SURGERY

## 2023-10-27 PROCEDURE — 97530 THERAPEUTIC ACTIVITIES: CPT

## 2023-10-27 PROCEDURE — 63710000001 INSULIN LISPRO (HUMAN) PER 5 UNITS: Performed by: ORTHOPAEDIC SURGERY

## 2023-10-27 PROCEDURE — 25010000002 HYDROMORPHONE PER 4 MG: Performed by: ORTHOPAEDIC SURGERY

## 2023-10-27 PROCEDURE — 63710000001 INSULIN LISPRO (HUMAN) PER 5 UNITS: Performed by: STUDENT IN AN ORGANIZED HEALTH CARE EDUCATION/TRAINING PROGRAM

## 2023-10-27 PROCEDURE — 85018 HEMOGLOBIN: CPT | Performed by: ORTHOPAEDIC SURGERY

## 2023-10-27 PROCEDURE — 80048 BASIC METABOLIC PNL TOTAL CA: CPT | Performed by: STUDENT IN AN ORGANIZED HEALTH CARE EDUCATION/TRAINING PROGRAM

## 2023-10-27 PROCEDURE — 82948 REAGENT STRIP/BLOOD GLUCOSE: CPT

## 2023-10-27 PROCEDURE — 63710000001 INSULIN GLARGINE PER 5 UNITS: Performed by: STUDENT IN AN ORGANIZED HEALTH CARE EDUCATION/TRAINING PROGRAM

## 2023-10-27 RX ADMIN — HYDROCODONE BITARTRATE AND ACETAMINOPHEN 2 TABLET: 10; 325 TABLET ORAL at 21:32

## 2023-10-27 RX ADMIN — HYDROCODONE BITARTRATE AND ACETAMINOPHEN 2 TABLET: 10; 325 TABLET ORAL at 13:07

## 2023-10-27 RX ADMIN — DOCUSATE SODIUM 50MG AND SENNOSIDES 8.6MG 2 TABLET: 8.6; 5 TABLET, FILM COATED ORAL at 21:31

## 2023-10-27 RX ADMIN — Medication 10 ML: at 08:30

## 2023-10-27 RX ADMIN — HYDROMORPHONE HYDROCHLORIDE 0.5 MG: 1 INJECTION, SOLUTION INTRAMUSCULAR; INTRAVENOUS; SUBCUTANEOUS at 01:48

## 2023-10-27 RX ADMIN — VALSARTAN 320 MG: 320 TABLET, FILM COATED ORAL at 08:29

## 2023-10-27 RX ADMIN — INSULIN LISPRO 4 UNITS: 100 INJECTION, SOLUTION INTRAVENOUS; SUBCUTANEOUS at 17:16

## 2023-10-27 RX ADMIN — ASPIRIN 81 MG: 81 TABLET, COATED ORAL at 21:31

## 2023-10-27 RX ADMIN — INSULIN LISPRO 2 UNITS: 100 INJECTION, SOLUTION INTRAVENOUS; SUBCUTANEOUS at 21:32

## 2023-10-27 RX ADMIN — FAMOTIDINE 40 MG: 20 TABLET, FILM COATED ORAL at 08:30

## 2023-10-27 RX ADMIN — INSULIN GLARGINE 20 UNITS: 100 INJECTION, SOLUTION SUBCUTANEOUS at 21:31

## 2023-10-27 RX ADMIN — INSULIN LISPRO 5 UNITS: 100 INJECTION, SOLUTION INTRAVENOUS; SUBCUTANEOUS at 07:45

## 2023-10-27 RX ADMIN — INSULIN LISPRO 2 UNITS: 100 INJECTION, SOLUTION INTRAVENOUS; SUBCUTANEOUS at 07:45

## 2023-10-27 RX ADMIN — POLYETHYLENE GLYCOL 3350 17 G: 17 POWDER, FOR SOLUTION ORAL at 08:30

## 2023-10-27 RX ADMIN — AMLODIPINE BESYLATE 10 MG: 10 TABLET ORAL at 08:29

## 2023-10-27 RX ADMIN — BISACODYL 10 MG: 10 SUPPOSITORY RECTAL at 21:31

## 2023-10-27 RX ADMIN — INSULIN LISPRO 2 UNITS: 100 INJECTION, SOLUTION INTRAVENOUS; SUBCUTANEOUS at 11:36

## 2023-10-27 RX ADMIN — HYDROMORPHONE HYDROCHLORIDE 0.5 MG: 1 INJECTION, SOLUTION INTRAMUSCULAR; INTRAVENOUS; SUBCUTANEOUS at 07:00

## 2023-10-27 RX ADMIN — ROSUVASTATIN CALCIUM 40 MG: 40 TABLET, FILM COATED ORAL at 08:29

## 2023-10-27 RX ADMIN — ASPIRIN 81 MG: 81 TABLET, COATED ORAL at 08:29

## 2023-10-27 RX ADMIN — INSULIN LISPRO 5 UNITS: 100 INJECTION, SOLUTION INTRAVENOUS; SUBCUTANEOUS at 17:17

## 2023-10-27 RX ADMIN — HYDROCODONE BITARTRATE AND ACETAMINOPHEN 2 TABLET: 10; 325 TABLET ORAL at 17:17

## 2023-10-27 RX ADMIN — HYDROCODONE BITARTRATE AND ACETAMINOPHEN 2 TABLET: 10; 325 TABLET ORAL at 05:27

## 2023-10-27 RX ADMIN — DOCUSATE SODIUM 50MG AND SENNOSIDES 8.6MG 2 TABLET: 8.6; 5 TABLET, FILM COATED ORAL at 08:29

## 2023-10-27 RX ADMIN — INSULIN LISPRO 5 UNITS: 100 INJECTION, SOLUTION INTRAVENOUS; SUBCUTANEOUS at 11:38

## 2023-10-27 RX ADMIN — Medication 10 ML: at 21:35

## 2023-10-27 NOTE — CONSULTS
Diabetes Education    Patient Name:  Sasha Knapp  YOB: 1965  MRN: [id number]  Admit Date:  10/24/2023        Attempted diabetes education,pt in too much pain at this time. Pt has been on insulin in past.She is due to go to rehab at discharge      Electronically signed by:  Gloria Britt RN  10/27/23 13:03 EDT
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
## 2023-10-27 NOTE — PLAN OF CARE
Goal Outcome Evaluation:  Plan of Care Reviewed With: patient           Outcome Evaluation: Patient remains NWB on LLE. PRN medications utilized for pain. Slightly hypertensive, receiving scheduled medications for such. Plan to DC tomorrow. Will continue to monitor.

## 2023-10-27 NOTE — CASE MANAGEMENT/SOCIAL WORK
Continued Stay Note  [facility]     Patient Name: Sasha Knapp  MRN: [id number]  Today's Date: 10/27/2023    Admit Date: 10/24/2023    Plan: [place] - precert received and is good thru the weekend.   Discharge Plan       Row Name 10/27/23 1401       Plan    Plan [place] - precert received and is good thru the weekend.    Plan Comments S/W Erica/ [facility] who confirms precert has been received and bed is available.  D/W Dr Castro who feels pt will be ready for DC tomorrow.  [facility] arranged for tomorrow 10/28 @ 1500. Pt notified and is in agreement.  Packet for [place] and EMS form placed on chart. ...........Divine DIANA/ EDWARDO                   Discharge Codes    No documentation.                 Expected Discharge Date and Time       Expected Discharge Date Expected Discharge Time    Oct 28, 2023               Divine Meléndez RN

## 2023-10-27 NOTE — CASE MANAGEMENT/SOCIAL WORK
Continued Stay Note  [facility]     Patient Name: Sasha Knapp  MRN: [id number]  Today's Date: 10/27/2023    Admit Date: 10/24/2023    Plan: [place] - precert received and is good thru the weekend.   Discharge Plan       Row Name 10/27/23 1401       Plan    Plan [place] - precert received and is good thru the weekend.    Plan Comments S/W [facility]a/ [facility] who confirms precert has been received and bed is available.  D/W Dr Castro who feels pt will be ready for DC tomorrow.  [facility] arranged for tomorrow 10/28 @ 1500. Pt notified and is in agreement.  Packet for [place] and EMS form placed on chart. ...........Divine DIANA/ EDWARDO                   Discharge Codes    No documentation.                 Expected Discharge Date and Time       Expected Discharge Date Expected Discharge Time    Oct 28, 2023               Divine Meléndez RN

## 2023-10-27 NOTE — PLAN OF CARE
Goal Outcome Evaluation:  Plan of Care Reviewed With: patient        Progress: no change  Outcome Evaluation: Pt seen by PT this AM for tx. Pt verbalized understanding of NWB for L LE when asked. Pt sat up to EOB w/ improved SBA and a little extra time. Pt stood then amb approx 18' w/ CGA and use of fww. NWB for L LE maintained. Pt was UIC at end of session. Pt is currently awaiting precert for SNF.      Anticipated Discharge Disposition (PT): skilled nursing facility

## 2023-10-27 NOTE — THERAPY TREATMENT NOTE
Patient Name: Sasha Knapp  : 1965    MRN: [id number]                              Today's Date: 10/27/2023       Admit Date: 10/24/2023    Visit Dx:     ICD-10-CM ICD-9-CM   1. Closed displaced comminuted fracture of shaft of left femur, initial encounter  S72.352A 821.01     Patient Active Problem List   Diagnosis    Closed displaced comminuted fracture of shaft of left femur    HTN (hypertension)    Type 2 diabetes mellitus, without long-term current use of insulin    HLD (hyperlipidemia)    Obesity (BMI 30-39.9)     Past Medical History:   Diagnosis Date    Diabetes mellitus     Hyperlipidemia     Hypertension     Sleep apnea      Past Surgical History:   Procedure Laterality Date    CARPAL TUNNEL RELEASE Left     HYSTERECTOMY  2000    REPLACEMENT TOTAL KNEE BILATERAL Bilateral       General Information       Row Name 10/27/23 0924          Physical Therapy Time and Intention    Document Type therapy note (daily note)  -     Mode of Treatment physical therapy  -       Row Name 10/27/23 0924          General Information    Existing Precautions/Restrictions brace worn when out of bed;non-weight bearing  KI and NWB for L LE  -PH     Barriers to Rehab medically complex;previous functional deficit  -PH       Row Name 10/27/23 0924          Cognition    Orientation Status (Cognition) oriented x 4  -PH       Row Name 10/27/23 0924          Safety Issues, Functional Mobility    Impairments Affecting Function (Mobility) endurance/activity tolerance;pain;balance  -PH     Comment, Safety Issues/Impairments (Mobility) gt belt and non skid socks  -PH               User Key  (r) = Recorded By, (t) = Taken By, (c) = Cosigned By      Initials Name Provider Type    PH Elis Tucker PTA Physical Therapist Assistant                   Mobility       Row Name 10/27/23 0925          Bed Mobility    Bed Mobility supine-sit  -PH     Supine-Sit [place] (Bed Mobility) standby assist  -PH     Assistive  Device (Bed Mobility) head of bed elevated;bed rails  -PH     Comment, (Bed Mobility) a little extra time to EOB  -PH       Row Name 10/27/23 0925          Transfers    Comment, (Transfers) pt verbalized understand of NWB for L LE when discussed  -PH       Row Name 10/27/23 0925          Sit-Stand Transfer    Sit-Stand [place] (Transfers) contact guard;verbal cues  -PH     Assistive Device (Sit-Stand Transfers) walker, front-wheeled  -PH       Row Name 10/27/23 0925          Gait/Stairs (Locomotion)    [place] Level (Gait) verbal cues;contact guard  -PH     Assistive Device (Gait) walker, front-wheeled  -PH     Distance in Feet (Gait) approx 18'  -PH     Deviations/Abnormal Patterns (Gait) jonah decreased;gait speed decreased;stride length decreased  -PH     [place] Level (Stairs) unable to assess  -PH     Comment, (Gait/Stairs) pt maintained NWB w/ pt slow and no overt LOB  -PH       Row Name 10/27/23 0925          Mobility    Extremity Weight-bearing Status left lower extremity  -PH     Left Lower Extremity (Weight-bearing Status) non weight-bearing (NWB)  -PH               User Key  (r) = Recorded By, (t) = Taken By, (c) = Cosigned By      Initials Name Provider Type    PH Elis Tucker PTA Physical Therapist Assistant                   Obj/Interventions       Row Name 10/27/23 0926          Motor Skills    Therapeutic Exercise other (see comments)  BAP x 10 reps  -PH       Row Name 10/27/23 0926          Balance    Balance Assessment sitting static balance;standing static balance  -PH     Static Sitting Balance standby assist  -PH     Static Standing Balance contact guard  -PH     Position/Device Used, Standing Balance walker, front-wheeled  -PH               User Key  (r) = Recorded By, (t) = Taken By, (c) = Cosigned By      Initials Name Provider Type    PH Elis Tucker PTA Physical Therapist Assistant                   Goals/Plan    No documentation.                   Clinical Impression       Row Name 10/27/23 0927          Pain    Pretreatment Pain Rating 4/10  -PH     Posttreatment Pain Rating 6/10  -PH     Pain Location - Side/Orientation Left  -PH     Pain Location lower  -PH     Pain Location - extremity  -PH     Pain Intervention(s) Repositioned;Elevated;Rest  -PH     Additional Documentation Pain Scale: Numbers Pre/Post-Treatment (Group)  -PH       Row Name 10/27/23 0927          Plan of Care Review    Plan of Care Reviewed With patient  -PH     Progress no change  -PH     Outcome Evaluation Pt seen by PT this AM for tx. Pt verbalized understanding of NWB for L LE when asked. Pt sat up to EOB w/ improved SBA and a little extra time. Pt stood then amb approx 18' w/ CGA and use of fww. NWB for L LE maintained. Pt was UIC at end of session. Pt is currently awaiting precert for SNF.  -PH       Row Name 10/27/23 0927          Positioning and Restraints    Pre-Treatment Position in bed  -PH     Post Treatment Position chair  -PH     In Chair reclined;call light within reach;encouraged to call for assist;exit alarm on;legs elevated;notified nsg  -PH               User Key  (r) = Recorded By, (t) = Taken By, (c) = Cosigned By      Initials Name Provider Type    PH Elis Tucker PTA Physical Therapist Assistant                   Outcome Measures       Row Name 10/27/23 0929 10/27/23 0745       How much help from another person do you currently need...    Turning from your back to your side while in flat bed without using bedrails? 4  -PH 3  -LB    Moving from lying on back to sitting on the side of a flat bed without bedrails? 4  -PH 3  -LB    Moving to and from a bed to a chair (including a wheelchair)? 3  -PH 3  -LB    Standing up from a chair using your arms (e.g., wheelchair, bedside chair)? 3  -PH 3  -LB    Climbing 3-5 steps with a railing? 2  -PH 2  -LB    To walk in hospital room? 3  -PH 2  -LB    AM-PAC 6 Clicks Score (PT) 19  -PH 16  -LB    Highest level of  mobility 6 --> Walked 10 steps or more  - 5 --> Static standing  -LB      Row Name 10/26/23 2141          How much help from another person do you currently need...    Turning from your back to your side while in flat bed without using bedrails? 3  -EG     Moving from lying on back to sitting on the side of a flat bed without bedrails? 2  -EG     Moving to and from a bed to a chair (including a wheelchair)? 2  -EG     Standing up from a chair using your arms (e.g., wheelchair, bedside chair)? 2  -EG     Climbing 3-5 steps with a railing? 1  -EG     To walk in hospital room? 2  -EG     AM-PAC 6 Clicks Score (PT) 12  -EG     Highest level of mobility 4 --> Transferred to chair/commode  -EG       Row Name 10/27/23 0929          Functional Assessment    Outcome Measure Options AM-PAC 6 Clicks Basic Mobility (PT)  -               User Key  (r) = Recorded By, (t) = Taken By, (c) = Cosigned By      Initials Name Provider Type    Carolyn Carnes, RN Registered Nurse     Elis Tucker PTA Physical Therapist Assistant    Lani Metcalf, RN Registered Nurse                                 Physical Therapy Education       Title: PT OT SLP Therapies (In Progress)       Topic: Physical Therapy (Done)       Point: Mobility training (Done)       Learning Progress Summary             Patient Acceptance, E,TB, VU by  at 10/27/2023 0929    Acceptance, E,TB,D, VU,NR by  at 10/26/2023 1024                         Point: Home exercise program (Done)       Learning Progress Summary             Patient Acceptance, E,TB, VU by  at 10/27/2023 0929    Acceptance, E,TB,D, VU,NR by  at 10/26/2023 1024                         Point: Body mechanics (Done)       Learning Progress Summary             Patient Acceptance, E,TB, VU by  at 10/27/2023 0929    Acceptance, E,TB,D, VU,NR by  at 10/26/2023 1024                         Point: Precautions (Done)       Learning Progress Summary             Patient  Acceptance, E,TB, VU by  at 10/27/2023 0929    Acceptance, E,TB,D, VU,NR by  at 10/26/2023 1024                                         User Key       Initials Effective Dates Name Provider Type Discipline     06/16/21 -  Meka Pandya, PT Physical Therapist PT     06/16/21 -  Elis Tucker PTA Physical Therapist Assistant PT                  PT Recommendation and Plan     Plan of Care Reviewed With: patient  Progress: no change  Outcome Evaluation: Pt seen by PT this AM for tx. Pt verbalized understanding of NWB for L LE when asked. Pt sat up to EOB w/ improved SBA and a little extra time. Pt stood then amb approx 18' w/ CGA and use of fww. NWB for L LE maintained. Pt was UIC at end of session. Pt is currently awaiting precert for SNF.     Time Calculation:         PT Charges       Row Name 10/27/23 0929             Time Calculation    Start Time 0829  -PH      Stop Time 0845  -PH      Time Calculation (min) 16 min  -PH      PT Received On 10/27/23  -PH      PT - Next Appointment 10/28/23  -PH         Timed Charges    38698 - PT Therapeutic Activity Minutes 16  -PH         Total Minutes    Timed Charges Total Minutes 16  -PH       Total Minutes 16  -PH                User Key  (r) = Recorded By, (t) = Taken By, (c) = Cosigned By      Initials Name Provider Type     Elis Tucker PTA Physical Therapist Assistant                  Therapy Charges for Today       Code Description Service Date Service Provider Modifiers Qty    44026221544 HC PT THERAPEUTIC ACT EA 15 MIN 10/27/2023 Elis Tucker PTA GP 1            PT G-Codes  Outcome Measure Options: AM-PAC 6 Clicks Basic Mobility (PT)  AM-PAC 6 Clicks Score (PT): 19  AM-PAC 6 Clicks Score (OT): 15  PT Discharge Summary  Anticipated Discharge Disposition (PT): skilled nursing facility    Elis Tucker PTA  10/27/2023

## 2023-10-27 NOTE — PAYOR COMM NOTE
"Adair Rea (58 y.o. Female)     PLEASE SEE ATTACHED FOR CONTINUED INPT STAY DAYS    REF #    [id number]     PLEASE CALL DUKE JAMA RN/ DEPT @ [phone]   OR [phone]    THANK YOU  [facility]        Date of Birth   1965    Social Security Number       Address   [address] [place] [address]    Home Phone   [phone]    MRN   [id number]       [place]   [facility]    Marital Status   Single                            Admission Date   10/24/23    Admission Type   Emergency    Admitting Provider   Mu Esteves MD    Attending Provider   Mathew Castro MD    Department, Room/Bed   [address], S513/1       Discharge Date       Discharge Disposition       Discharge Destination                                 Attending Provider: Mathew Castro MD    Allergies: No Known Allergies    Isolation: None   Infection: None   Code Status: CPR    Ht: 162.6 cm (64\")   Wt: 85.3 kg (188 lb)    Admission Cmt: None   Principal Problem: Closed displaced comminuted fracture of shaft of left femur [S72.352A]                   Active Insurance as of 10/24/2023       Primary Coverage       Payor Plan Insurance Group Employer/Plan Group    [employer]HE[employer] BLUE CROSS AN[employer]M 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 [id number]         Subscriber Name Subscriber Birth Date Member ID       ADAIR REA 1965 [id number]               Secondary Coverage       Payor Plan Insurance Group Employer/Plan Group    [place] [id number]       Payor Plan Address Payor Plan Phone Number Payor Plan Fax Number Effective Dates    HU[employer] [address] [address] [phone]  1/1/2021 - None Entered    [facility] [id number]         Subscriber Name Subscriber Birth Date Member ID       " ADAIR REA 1965 [id number]                     Emergency Contacts        (Rel.) Home Phone Work Phone Mobile Phone    Edis Najera (Brother) -- -- [phone]              [place]: [facility] [id number]  Tax ID [id number]  Medication Administration Report for Adair Rea as of 10/27/23 1457     Legend:    Given Hold Not Given Due Canceled Entry Other Actions    Time Time (Time) Time Time-Action         Discontinued     Completed     Future     MAR Hold     Linked             Medications 10/26/23 10/27/23      acetaminophen (TYLENOL) tablet 650 mg  Dose: 650 mg  Freq: Every 4 Hours PRN Route: PO  PRN Reason: Mild Pain  Start: 10/24/23 0810   Admin Instructions:   If given for fever, use fever parameter: fever greater than 100.4 °F  Based on patient request - if ordered for moderate or severe pain, provider allows for administration of a medication prescribed for a lower pain scale.    Do not exceed 4 grams of acetaminophen in a 24 hr period. Max dose of 2gm for AST/ALT greater than 120 units/L.    If given for pain, use the following pain scale:   Mild Pain = Pain Score of 1-3, CPOT 1-2  Moderate Pain = Pain Score of 4-6, CPOT 3-4  Severe Pain = Pain Score of 7-10, CPOT 5-8        Or  acetaminophen (TYLENOL) 160 MG/5ML oral solution 650 mg  Dose: 650 mg  Freq: Every 4 Hours PRN Route: PO  PRN Reason: Mild Pain  Start: 10/24/23 0810   Admin Instructions:   If given for fever, use fever parameter: fever greater than 100.4 °F  Based on patient request - if ordered for moderate or severe pain, provider allows for administration of a medication prescribed for a lower pain scale.    Do not exceed 4 grams of acetaminophen in a 24 hr period. Max dose of 2gm for AST/ALT greater than 120 units/L.    If given for pain, use the following pain scale:   Mild Pain = Pain Score of 1-3, CPOT 1-2  Moderate Pain = Pain Score of 4-6, CPOT 3-4  Severe Pain = Pain Score of 7-10, CPOT 5-8         Or  acetaminophen (TYLENOL) suppository 650 mg  Dose: 650 mg  Freq: Every 4 Hours PRN Route: RE  PRN Reason: Mild Pain  Start: 10/24/23 0810   Admin Instructions:   If given for fever, use fever parameter: fever greater than 100.4 °F  Based on patient request - if ordered for moderate or severe pain, provider allows for administration of a medication prescribed for a lower pain scale.    Do not exceed 4 grams of acetaminophen in a 24 hr period. Max dose of 2gm for AST/ALT greater than 120 units/L.    If given for pain, use the following pain scale:   Mild Pain = Pain Score of 1-3, CPOT 1-2  Moderate Pain = Pain Score of 4-6, CPOT 3-4  Severe Pain = Pain Score of 7-10, CPOT 5-8         acetaminophen (TYLENOL) tablet 325 mg  Dose: 325 mg  Freq: Every 4 Hours PRN Route: PO  PRN Reason: Fever  PRN Comment: greater than 101 F  Start: 10/25/23 1804   Admin Instructions:   Based on patient request - if ordered for moderate or severe pain, provider allows for administration of a medication prescribed for a lower pain scale.    Do not exceed 4 grams of acetaminophen in a 24 hr period. Max dose of 2gm for AST/ALT greater than 120 units/L.    If given for pain, use the following pain scale:   Mild Pain = Pain Score of 1-3, CPOT 1-2  Moderate Pain = Pain Score of 4-6, CPOT 3-4  Severe Pain = Pain Score of 7-10, CPOT 5-8         amLODIPine (NORVASC) tablet 10 mg  Dose: 10 mg  Freq: Daily Route: PO  Start: 10/26/23 1545   Admin Instructions:   Hold for SBP less than 100, DBP less than 60  Caution: Look alike/sound alike drug alert. Avoid grapefruit juice.    1520-Canceled Entry            0829-Given               aspirin EC tablet 81 mg  Dose: 81 mg  Freq: Every 12 Hours Scheduled Route: PO  Indications Comment: VTE Prophylaxis  Start: 10/26/23 0900   Admin Instructions:   Do not crush or chew the capsules or tablets. The drug may not work as designed if the capsule or tablet is crushed or chewed. Swallow whole.  Do not exceed  4 grams of aspirin in a 24 hr period.    If given for pain, use the following pain scale:   Mild Pain = Pain Score of 1-3, CPOT 1-2  Moderate Pain = Pain Score of 4-6, CPOT 3-4  Severe Pain = Pain Score of 7-10, CPOT 5-8    0846-Given     2141-Given           0829-Given     2100              sennosides-docusate (PERICOLACE) 8.6-50 MG per tablet 2 tablet  Dose: 2 tablet  Freq: 2 Times Daily Route: PO  Start: 10/24/23 0900   Admin Instructions:   HOLD MEDICATION IF PATIENT HAS HAD BOWEL MOVEMENT. Start bowel management regimen if patient has not had a bowel movement after 12 hours.    0846-Given     2141-Given           0829-Given     2100             And  polyethylene glycol (MIRALAX) packet 17 g  Dose: 17 g  Freq: Daily PRN Route: PO  PRN Reason: Constipation  PRN Comment: Use if senna-docusate is ineffective  Start: 10/24/23 0809   Admin Instructions:   Use if no bowel movement after 12 hours. Mix in 6-8 ounces of water.  Use 4-8 ounces of water, tea, or juice for each 17 gram dose.    0846-Given            0830-Given              And  bisacodyl (DULCOLAX) EC tablet 5 mg  Dose: 5 mg  Freq: Daily PRN Route: PO  PRN Reason: Constipation  PRN Comment: Use if polyethylene glycol is ineffective  Start: 10/24/23 0809   Admin Instructions:   Use if no bowel movement after 12 hours.  Swallow whole. Do not crush, split, or chew tablet.        And  bisacodyl (DULCOLAX) suppository 10 mg  Dose: 10 mg  Freq: Daily PRN Route: RE  PRN Reason: Constipation  PRN Comment: Use if bisacodyl oral is ineffective  Start: 10/24/23 0809   Admin Instructions:   Use if no bowel movement after 12 hours.  Hold for diarrhea         dextrose (D50W) (25 g/50 mL) IV injection 25 g  Dose: 25 g  Freq: Every 15 Minutes PRN Route: IV  PRN Reason: Low Blood Sugar  PRN Comment: Blood Sugar Less Than 70  Start: 10/24/23 0812   Admin Instructions:   Blood sugar less than 70; patient has IV access - Unresponsive, NPO or Unable To Safely Swallow          dextrose (GLUTOSE) oral gel 15 g  Dose: 15 g  Freq: Every 15 Minutes PRN Route: PO  PRN Reason: Low Blood Sugar  PRN Comment: Blood sugar less than 70  Start: 10/24/23 0811   Admin Instructions:   BS<70, Patient Alert, Is not NPO, Can safely swallow.         ethyl alcohol 62 % 2 each  Dose: 2 swab   Freq: Once Route: NA  Start: 10/24/23 0818   Admin Instructions:   Administer 15-60 minutes prior to surgery following these steps: Clean nostrils with a tissue, apply a 62% ethyl alcohol swab to the right nostril gently rotating the swab for 30 seconds, repeat the process with the 2nd swab in the left nostril.         famotidine (PEPCID) tablet 40 mg  Dose: 40 mg  Freq: Daily Route: PO  Start: 10/25/23 1900    0846-Given            0830-Given               glucagon (GLUCAGEN) injection 1 mg  Dose: 1 mg  Freq: Every 15 Minutes PRN Route: IM  PRN Reason: Low Blood Sugar  PRN Comment: Blood Glucose Less Than 70  Start: 10/24/23 0812   Admin Instructions:   Blood Glucose Less Than 70 - Patient Without IV Access - Unresponsive, NPO or Unable To Safely Swallow  Reconstitute powder for injection by adding 1 mL of -supplied sterile diluent or sterile water for injection to a vial containing 1 mg of the drug, to provide solutions containing 1 mg/mL. Shake vial gently to dissolve.         HYDROcodone-acetaminophen (NORCO)  MG per tablet 1 tablet  Dose: 1 tablet  Freq: Every 4 Hours PRN Route: PO  PRN Reason: Moderate Pain  Start: 10/25/23 1804   End: 11/01/23 1803   Admin Instructions:   Based on patient request - if ordered for moderate or severe pain, provider allows for administration of a medication prescribed for a lower pain scale.  [BOBBY]    Do not exceed 4 grams of acetaminophen in a 24 hr period. Max dose of 2gm for AST/ALT greater than 120 units/L        If given for pain, use the following pain scale:   Mild Pain = Pain Score of 1-3, CPOT 1-2  Moderate Pain = Pain Score of 4-6, CPOT 3-4  Severe Pain =  Pain Score of 7-10, CPOT 5-8    0744-Given            1310-Canceled Entry [C]               HYDROcodone-acetaminophen (NORCO)  MG per tablet 2 tablet  Dose: 2 tablet  Freq: Every 4 Hours PRN Route: PO  PRN Reason: Severe Pain  Start: 10/25/23 1804   End: 11/01/23 1803   Admin Instructions:   Based on patient request - if ordered for moderate or severe pain, provider allows for administration of a medication prescribed for a lower pain scale.  [BOBBY]    Do not exceed 4 grams of acetaminophen in a 24 hr period. Max dose of 2gm for AST/ALT greater than 120 units/L        If given for pain, use the following pain scale:   Mild Pain = Pain Score of 1-3, CPOT 1-2  Moderate Pain = Pain Score of 4-6, CPOT 3-4  Severe Pain = Pain Score of 7-10, CPOT 5-8    0314-Given     1725-Given           0527-Given     1307-Given [C]              insulin glargine (LANTUS, SEMGLEE) injection 20 Units  Dose: 20 Units  Freq: Nightly Route: SC  Start: 10/26/23 2100   Admin Instructions:   Do not hold basal insulin without an order. Consider requesting a dose edit, if needed.      2205-Given            2100               insulin lispro (HUMALOG/ADMELOG) injection 2-7 Units  Dose: 2-7 Units  Freq: 4 Times Daily Before Meals & Nightly Route: SC  Start: 10/24/23 0828   Admin Instructions:   Correction Insulin - Low Dose - Total Insulin Dose Less Than 40 units/day (Lean, Elderly or Renal Patients)    Blood Glucose 150-199 mg/dL - 2 units  Blood Glucose 200-249 mg/dL - 3 units  Blood Glucose 250-299 mg/dL - 4 units  Blood Glucose 300-349 mg/dL - 5 units  Blood Glucose 350-400 mg/dL - 6 units  Blood Glucose Greater Than 400 mg/dL - 7 units & Call Provider   Caution: Look alike/sound alike drug alert    0743-Given     1209-Given     1700-Given     2205-Given         0745-Given     1136-Given     1730     2100            insulin lispro (HUMALOG/ADMELOG) injection 5 Units  Dose: 5 Units  Freq: 3 Times Daily With Meals Route: SC  Start: 10/26/23  1800   Admin Instructions:    Solution should be clear. If cloudy, contact pharmacy for a new vial. Scheduled mealtime doses of this medication should be held if patient NPO.   Caution: Look alike/sound alike drug alert    1700-Given            0745-Given     1138-Given     1800             labetalol (NORMODYNE,TRANDATE) injection 10 mg  Dose: 10 mg  Freq: Every 6 Hours PRN Route: IV  PRN Reason: High Blood Pressure  PRN Comment: SBP >180  Start: 10/24/23 0813   Admin Instructions:   As needed for SBP greater than 180  Give IV Push over 2 minutes.         melatonin tablet 5 mg  Dose: 5 mg  Freq: Nightly PRN Route: PO  PRN Reason: Sleep  Start: 10/24/23 0811         HYDROmorphone (DILAUDID) injection 0.5 mg  Dose: 0.5 mg  Freq: Every 2 Hours PRN Route: IV  PRN Reason: Severe Pain  Start: 10/25/23 1804   End: 10/27/23 1040   Admin Instructions:   If given for pain, use the following pain scale:  Mild Pain = Pain Score of 1-3, CPOT 1-2  Moderate Pain = Pain Score of 4-6, CPOT 3-4  Severe Pain = Pain Score of 7-10, CPOT 5-8    1024-Given     1441-Given     1824-Given     2141-Given         0148-Given     0700-Given             And  naloxone (NARCAN) injection 0.1 mg  Dose: 0.1 mg  Freq: Every 5 Minutes PRN Route: IV  PRN Reason: Respiratory Depression  Start: 10/25/23 1804   Admin Instructions:   If respiratory rate is less than 8 breaths/minute or patient is difficult to arouse stop any narcotics and contact physician. Administer slow IV push. Repeat as ordered until patient's respiratory rate is greater than 12 breaths/minute.         ondansetron (ZOFRAN) tablet 4 mg  Dose: 4 mg  Freq: Every 6 Hours PRN Route: PO  PRN Reasons: Nausea,Vomiting  Start: 10/25/23 1804   Admin Instructions:   If BOTH ondansetron (ZOFRAN) and promethazine (PHENERGAN) are ordered use ondansetron first and THEN promethazine IF ondansetron is ineffective.        Or  ondansetron (ZOFRAN) injection 4 mg  Dose: 4 mg  Freq: Every 6 Hours PRN  Route: IV  PRN Reasons: Nausea,Vomiting  Start: 10/25/23 1804   Admin Instructions:   If BOTH ondansetron (ZOFRAN) and promethazine (PHENERGAN) are ordered use ondansetron first and THEN promethazine IF ondansetron is ineffective.         rosuvastatin (CRESTOR) tablet 40 mg  Dose: 40 mg  Freq: Daily Route: PO  Start: 10/24/23 1200    0846-Given            0829-Given               sodium chloride 0.9 % flush 1-10 mL  Dose: 1-10 mL  Freq: As Needed Route: IV  PRN Reason: Line Care  Start: 10/25/23 1804         sodium chloride 0.9 % flush 10 mL  Dose: 10 mL  Freq: Every 12 Hours Scheduled Route: IV  Start: 10/25/23 2100    0848-Given     2146-Given           0830-Given     2100              sodium chloride 0.9 % infusion  Rate: 100 mL/hr Dose: 100 mL/hr  Freq: Continuous Route: IV  Start: 10/25/23 0845    0941-New Bag     1830-New Bag               sodium chloride 0.9 % infusion 40 mL  Dose: 40 mL  Freq: As Needed Route: IV  PRN Reason: Line Care  Start: 10/25/23 1804   Admin Instructions:   Following administration of an IV intermittent medication, flush line with 40mL NS at 100mL/hr.         sodium chloride 0.9 % infusion 40 mL  Dose: 40 mL  Freq: As Needed Route: IV  PRN Reason: Line Care  Start: 10/24/23 0809   Admin Instructions:   Following administration of an IV intermittent medication, flush line with 40mL NS at 100mL/hr.         valsartan (DIOVAN) tablet 320 mg  Dose: 320 mg  Freq: Daily Route: PO  Start: 10/26/23 1545   Admin Instructions:   Hold for SBP less than 100, DBP less than 60, or heart rate less than 50    1702-Given            0829-Given              Completed Medications  Medications 10/26/23 10/27/23       ceFAZolin in dextrose (ANCEF) IVPB solution 2,000 mg  Dose: 2,000 mg  Freq: Every 8 Hours Route: IV  Indications of Use: PERIOPERATIVE PHARMACOPROPHYLAXIS  Start: 10/25/23 2100   End: 10/26/23 0523   Admin Instructions:   Time first dose from pre-op dose.    Caution: Look alike/sound alike  drug alert    0453-New Bag                ceFAZolin in dextrose (ANCEF) IVPB solution 2,000 mg  Dose: 2,000 mg  Freq: Once Route: IV  Indications of Use: PERIOPERATIVE PHARMACOPROPHYLAXIS  Start: 10/24/23 2230   End: 10/25/23 0146   Admin Instructions:   Caution: Look alike/sound alike drug alert         famotidine (PEPCID) injection 20 mg  Dose: 20 mg  Freq: Once Route: IV  Start: 10/25/23 1230   End: 10/25/23 1237   Admin Instructions:   Give IV push over 2 minutes.         fentaNYL citrate (PF) (SUBLIMAZE) injection 50 mcg  Dose: 50 mcg  Freq: Once As Needed Route: IV  PRN Reason: Severe Pain  Start: 10/25/23 1228   End: 10/25/23 1238   Admin Instructions:   Maximum total dose of fentanyl is 50 mcg without additional orders from physician. May be used for preoperative pain or procedural sedation.  If given for pain, use the following pain scale:  Mild Pain = Pain Score of 1-3, CPOT 1-2  Moderate Pain = Pain Score of 4-6, CPOT 3-4  Severe Pain = Pain Score of 7-10, CPOT 5-8         HYDROmorphone (DILAUDID) injection 0.5 mg  Dose: 0.5 mg  Freq: Once Route: IV  Start: 10/24/23 0730   End: 10/24/23 0724   Admin Instructions:   Based on patient request - if ordered for moderate or severe pain, provider allows for administration of a medication prescribed for a lower pain scale.  If given for pain, use the following pain scale:  Mild Pain = Pain Score of 1-3, CPOT 1-2  Moderate Pain = Pain Score of 4-6, CPOT 3-4  Severe Pain = Pain Score of 7-10, CPOT 5-8         morphine injection 4 mg  Dose: 4 mg  Freq: Once Route: IV  Start: 10/24/23 0618   End: 10/24/23 0624   Admin Instructions:   If given for pain, use the following pain scale:  Mild Pain = Pain Score of 1-3, CPOT 1-2  Moderate Pain = Pain Score of 4-6, CPOT 3-4  Severe Pain = Pain Score of 7-10, CPOT 5-8         ondansetron (ZOFRAN) injection 4 mg  Dose: 4 mg  Freq: Once As Needed Route: IV  PRN Reasons: Nausea,Vomiting  Indications of Use: POSTOPERATIVE NAUSEA  AND VOMITING  Start: 10/25/23 1514   End: 10/25/23 1643   Admin Instructions:   If BOTH ondansetron (ZOFRAN) and promethazine (PHENERGAN) are ordered use ondansetron first and THEN promethazine IF ondansetron is ineffective.        Discontinued Medications  Medications 10/26/23 10/27/23       amLODIPine (NORVASC) tablet 10 mg  Dose: 10 mg  Freq: Every 24 Hours Scheduled Route: PO  Start: 10/25/23 0900   End: 10/26/23 1453   Admin Instructions:   Hold for SBP less than 100, DBP less than 60  Caution: Look alike/sound alike drug alert. Avoid grapefruit juice.    0846-Given                diphenhydrAMINE (BENADRYL) injection 12.5 mg  Dose: 12.5 mg  Freq: Every 15 Minutes PRN Route: IV  PRN Reason: Itching  PRN Comment: May repeat x 1  Start: 10/25/23 1514   End: 10/25/23 1756   Admin Instructions:   Caution: Look alike/sound alike drug alert. This med may be ordered in other forms and routes. Before giving verify the last time the drug was given by any route/form.         droperidol (INAPSINE) injection 0.625 mg  Dose: 0.625 mg  Freq: Every 20 Minutes PRN Route: IV  PRN Reasons: Nausea,Vomiting  Indications of Use: NAUSEA AND VOMITING  Start: 10/25/23 1514   End: 10/25/23 1756   Admin Instructions:   Use ondansetron first; proceed to droperidol for nausea refractory to ondansetron.        Or  droperidol (INAPSINE) injection 0.625 mg  Dose: 0.625 mg  Freq: Every 20 Minutes PRN Route: IM  PRN Reasons: Nausea,Vomiting  Start: 10/25/23 1514   End: 10/25/23 1756   Admin Instructions:   Use ondansetron first; proceed to droperidol for nausea refractory to ondansetron.         ePHEDrine injection 5 mg  Dose: 5 mg  Freq: Once As Needed Route: IV  PRN Comment: symptomatic hypotension - Notify attending anesthesiologist if this needs to be given  Start: 10/25/23 1514   End: 10/25/23 1756   Admin Instructions:   Caution: Look alike/sound alike drug alert   Dilute with NS to 5-10 mg/mL.  Central line preferred, if unavailable use  large bore IV access with frequent nurse monitoring of IV site.         fentaNYL citrate (PF) (SUBLIMAZE) injection 50 mcg  Dose: 50 mcg  Freq: Every 5 Minutes PRN Route: IV  PRN Reason: Severe Pain  Start: 10/25/23 1514   End: 10/25/23 1756   Admin Instructions:   Maximum total dose of fentanyl is 200 mcg.  If given for pain, use the following pain scale:  Mild Pain = Pain Score of 1-3, CPOT 1-2  Moderate Pain = Pain Score of 4-6, CPOT 3-4  Severe Pain = Pain Score of 7-10, CPOT 5-8         flumazenil (ROMAZICON) injection 0.2 mg  Dose: 0.2 mg  Freq: As Needed Route: IV  PRN Comment: for benzodiazepine induced unresponsiveness or sedation  Indications of Use: BENZODIAZEPINE-INDUCED SEDATION  Start: 10/25/23 1514   End: 10/25/23 1756   Admin Instructions:   Notify Anesthesia if given  ** give IV over 15-30 seconds **         hydrALAZINE (APRESOLINE) injection 5 mg  Dose: 5 mg  Freq: Every 10 Minutes PRN Route: IV  PRN Reason: High Blood Pressure  PRN Comment: for systolic blood pressure greater than 180 mmHg or diastolic blood pressure greater than 105 mmHg  Start: 10/25/23 1514   End: 10/25/23 1756   Admin Instructions:   Up to 20 mg. If labetalol and hydralazine are both ordered, use labetalol first.  Caution: Look alike/sound alike drug alert         HYDROcodone-acetaminophen (NORCO)  MG per tablet 1 tablet  Dose: 1 tablet  Freq: Every 4 Hours PRN Route: PO  PRN Reason: Moderate Pain  Start: 10/24/23 0803   End: 10/25/23 1818   Admin Instructions:   [BOBBY]    Do not exceed 4 grams of acetaminophen in a 24 hr period. Max dose of 2gm for AST/ALT greater than 120 units/L        If given for pain, use the following pain scale:   Mild Pain = Pain Score of 1-3, CPOT 1-2  Moderate Pain = Pain Score of 4-6, CPOT 3-4  Severe Pain = Pain Score of 7-10, CPOT 5-8        Or  HYDROcodone-acetaminophen (NORCO)  MG per tablet 2 tablet  Dose: 2 tablet  Freq: Every 4 Hours PRN Route: PO  PRN Reason: Severe Pain  Start:  10/24/23 0803   End: 10/25/23 1818   Admin Instructions:   [BOBBY]    Do not exceed 4 grams of acetaminophen in a 24 hr period. Max dose of 2gm for AST/ALT greater than 120 units/L        If given for pain, use the following pain scale:   Mild Pain = Pain Score of 1-3, CPOT 1-2  Moderate Pain = Pain Score of 4-6, CPOT 3-4  Severe Pain = Pain Score of 7-10, CPOT 5-8         HYDROcodone-acetaminophen (NORCO) 5-325 MG per tablet 1 tablet  Dose: 1 tablet  Freq: Once As Needed Route: PO  PRN Reason: Moderate Pain  Start: 10/25/23 1514   End: 10/25/23 1756   Admin Instructions:   Based on patient request - if ordered for moderate or severe pain, provider allows for administration of a medication prescribed for a lower pain scale.  [BOBBY]    Do not exceed 4 grams of acetaminophen in a 24 hr period. Max dose of 2gm for AST/ALT greater than 120 units/L        If given for pain, use the following pain scale:   Mild Pain = Pain Score of 1-3, CPOT 1-2  Moderate Pain = Pain Score of 4-6, CPOT 3-4  Severe Pain = Pain Score of 7-10, CPOT 5-8         HYDROcodone-acetaminophen (NORCO) 5-325 MG per tablet 1 tablet  Dose: 1 tablet  Freq: Every 4 Hours PRN Route: PO  PRN Reason: Moderate Pain  Start: 10/24/23 0811   End: 10/25/23 1804   Admin Instructions:   Based on patient request - if ordered for moderate or severe pain, provider allows for administration of a medication prescribed for a lower pain scale.  [BOBBY]    Do not exceed 4 grams of acetaminophen in a 24 hr period. Max dose of 2gm for AST/ALT greater than 120 units/L        If given for pain, use the following pain scale:   Mild Pain = Pain Score of 1-3, CPOT 1-2  Moderate Pain = Pain Score of 4-6, CPOT 3-4  Severe Pain = Pain Score of 7-10, CPOT 5-8         HYDROmorphone (DILAUDID) injection 0.5 mg  Dose: 0.5 mg  Freq: Every 5 Minutes PRN Route: IV  PRN Reason: Moderate Pain  Start: 10/25/23 1514   End: 10/25/23 1756   Admin Instructions:   Maximum total dose of hydromorphone  is 2 mg.  If given for pain, use the following pain scale:  Mild Pain = Pain Score of 1-3, CPOT 1-2  Moderate Pain = Pain Score of 4-6, CPOT 3-4  Severe Pain = Pain Score of 7-10, CPOT 5-8         HYDROmorphone (DILAUDID) injection 0.5 mg  Dose: 0.5 mg  Freq: Every 2 Hours PRN Route: IV  PRN Reason: Severe Pain  Start: 10/24/23 0803   End: 10/25/23 1815   Admin Instructions:   Based on patient request - if ordered for moderate or severe pain, provider allows for administration of a medication prescribed for a lower pain scale.  If given for pain, use the following pain scale:  Mild Pain = Pain Score of 1-3, CPOT 1-2  Moderate Pain = Pain Score of 4-6, CPOT 3-4  Severe Pain = Pain Score of 7-10, CPOT 5-8         ipratropium-albuterol (DUO-NEB) nebulizer solution 3 mL  Dose: 3 mL  Freq: Once As Needed Route: NEBULIZATION  PRN Reasons: Wheezing,Shortness of Air  PRN Comment: bronchospasm  Start: 10/25/23 1514   End: 10/25/23 1756   Admin Instructions:   Notify Anesthesia if minineb is given         labetalol (NORMODYNE,TRANDATE) injection 5 mg  Dose: 5 mg  Freq: Every 5 Minutes PRN Route: IV  PRN Reason: High Blood Pressure  PRN Comment: for systolic blood pressure greater than 180 mmHg or diastolic blood pressure greater than 105 mmHg  Start: 10/25/23 1514   End: 10/25/23 1756   Admin Instructions:   Hold for heart rate less than 60.    If labetalol and hydralazine are both ordered, use labetalol first. Maximum dose of labetalol is 20mg IV while in PACU, notify anesthesiologist for further orders if needed.  Give IV Push over 2 minutes.         lactated ringers infusion  Rate: 100 mL/hr Dose: 100 mL/hr  Freq: Continuous Route: IV  Start: 10/25/23 1900   End: 10/25/23 1824         lactated ringers infusion  Rate: 9 mL/hr Dose: 9 mL/hr  Freq: Continuous Route: IV  Start: 10/25/23 1230   End: 10/25/23 1804         lidocaine (XYLOCAINE) 1 % injection 0.5 mL  Dose: 0.5 mL  Freq: Once As Needed Route: ID  PRN Comment: IV  Start  Start: 10/25/23 1228   End: 10/25/23 1513         melatonin tablet 1 mg  Dose: 1 mg  Freq: Nightly PRN Route: PO  PRN Reason: Sleep  Start: 10/25/23 1804   End: 10/25/23 1816         midazolam (VERSED) injection 1 mg  Dose: 1 mg  Freq: Every 5 Minutes PRN Route: IV  PRN Comment: Anxiety prophylaxis, Pre-op comfort  Start: 10/25/23 1228   End: 10/25/23 1513   Admin Instructions:   May repeat dose in 5 minutes one time then contact provider for additional orders.           morphine injection 2 mg  Dose: 2 mg  Freq: Every 1 Hour PRN Route: IV  PRN Reason: Severe Pain  Start: 10/24/23 0601   End: 10/25/23 1804   Admin Instructions:   If given for pain, use the following pain scale:  Mild Pain = Pain Score of 1-3, CPOT 1-2  Moderate Pain = Pain Score of 4-6, CPOT 3-4  Severe Pain = Pain Score of 7-10, CPOT 5-8         morphine injection 4 mg  Dose: 4 mg  Freq: Every 4 Hours PRN Route: IV  PRN Reason: Moderate Pain  Start: 10/24/23 0811   End: 10/25/23 1804   Admin Instructions:   Based on patient request - if ordered for moderate or severe pain, provider allows for administration of a medication prescribed for a lower pain scale.  If given for pain, use the following pain scale:  Mild Pain = Pain Score of 1-3, CPOT 1-2  Moderate Pain = Pain Score of 4-6, CPOT 3-4  Severe Pain = Pain Score of 7-10, CPOT 5-8        And  naloxone (NARCAN) injection 0.4 mg  Dose: 0.4 mg  Freq: Every 5 Minutes PRN Route: IV  PRN Reason: Respiratory Depression  Start: 10/24/23 0811   End: 10/25/23 1804   Admin Instructions:   If respiratory rate is less than 8 breaths/minute or patient is difficult to arouse stop any narcotics and contact physician.   Administer slow IV push. Repeat as ordered until patient's respiratory rate is greater than 12 breaths/minute.         naloxone (NARCAN) injection 0.2 mg  Dose: 0.2 mg  Freq: As Needed Route: IV  PRN Reasons: Opioid Reversal,Respiratory Depression  PRN Comment: unresponsiveness, decrease  oxygen saturation  Indications of Use: ACUTE RESPIRATORY FAILURE,OPIOID-INDUCED RESPIRATORY DEPRESSION  Start: 10/25/23 1514   End: 10/25/23 1756   Admin Instructions:   Notify Anesthesia if given         ondansetron (ZOFRAN) tablet 4 mg  Dose: 4 mg  Freq: Every 6 Hours PRN Route: PO  PRN Reasons: Nausea,Vomiting  Start: 10/24/23 0811   End: 10/25/23 1804   Admin Instructions:   If BOTH ondansetron (ZOFRAN) and promethazine (PHENERGAN) are ordered use ondansetron first and THEN promethazine IF ondansetron is ineffective.        Or  ondansetron (ZOFRAN) injection 4 mg  Dose: 4 mg  Freq: Every 6 Hours PRN Route: IV  PRN Reasons: Nausea,Vomiting  Start: 10/24/23 0811   End: 10/25/23 1804   Admin Instructions:   If BOTH ondansetron (ZOFRAN) and promethazine (PHENERGAN) are ordered use ondansetron first and THEN promethazine IF ondansetron is ineffective.         ondansetron (ZOFRAN) injection 8 mg  Dose: 8 mg  Freq: Once As Needed Route: IV  PRN Reasons: Nausea,Vomiting  Start: 10/24/23 0601   End: 10/25/23 1815         oxyCODONE-acetaminophen (PERCOCET) 7.5-325 MG per tablet 1 tablet  Dose: 1 tablet  Freq: Every 4 Hours PRN Route: PO  PRN Reason: Severe Pain  Start: 10/25/23 1514   End: 10/25/23 1756   Admin Instructions:   [BOBBY]    Do not exceed 4 grams of acetaminophen in a 24 hr period. Max dose of 2gm for AST/ALT greater than 120 units/L        If given for pain, use the following pain scale:   Mild Pain = Pain Score of 1-3, CPOT 1-2  Moderate Pain = Pain Score of 4-6, CPOT 3-4  Severe Pain = Pain Score of 7-10, CPOT 5-8         promethazine (PHENERGAN) suppository 25 mg  Dose: 25 mg  Freq: Once As Needed Route: RE  PRN Reasons: Nausea,Vomiting  Start: 10/25/23 1514   End: 10/25/23 1756   Admin Instructions:   If BOTH ondansetron (ZOFRAN) and promethazine (PHENERGAN) are ordered use ondansetron first and THEN promethazine IF ondansetron is ineffective.        Or  promethazine (PHENERGAN) tablet 25 mg  Dose: 25  mg  Freq: Once As Needed Route: PO  PRN Reasons: Nausea,Vomiting  Start: 10/25/23 1514   End: 10/25/23 175   Admin Instructions:   If BOTH ondansetron (ZOFRAN) and promethazine (PHENERGAN) are ordered use ondansetron first and THEN promethazine IF ondansetron is ineffective.           sennosides-docusate (PERICOLACE) 8.6-50 MG per tablet 2 tablet  Dose: 2 tablet  Freq: 2 Times Daily Route: PO  Start: 10/25/23 2100   End: 10/25/23 181         sodium chloride (NS) irrigation solution  Freq: As Needed  Start: 10/25/23 1337   End: 10/25/23 1511         sodium chloride 0.9 % flush 10 mL  Dose: 10 mL  Freq: As Needed Route: IV  PRN Reason: Line Care  Start: 10/24/23 0809   End: 10/25/23 1804         sodium chloride 0.9 % flush 10 mL  Dose: 10 mL  Freq: Every 12 Hours Scheduled Route: IV  Start: 10/24/23 0900   End: 10/25/23 1804         sodium chloride 0.9 % flush 3 mL  Dose: 3 mL  Freq: Every 12 Hours Scheduled Route: IV  Start: 10/25/23 1230   End: 10/25/23 1513         sodium chloride 0.9 % flush 3-10 mL  Dose: 3-10 mL  Freq: As Needed Route: IV  PRN Reason: Line Care  Start: 10/25/23 1228   End: 10/25/23 1513         sodium chloride 3,000 mL with povidone-iodine 40 mL irrigation  Freq: As Needed  Start: 10/25/23 1444   End: 10/25/23 1511                       Physician Progress Notes (last 48 hours)        Mathew Castro MD at 10/27/23 1442              Name: Sasha Knapp ADMIT: 10/24/2023   : 1965  PCP: Provider, No Known    MRN: [id number] LOS: 3 days   AGE/SEX: 58 y.o. female  ROOM: [facility]     Subjective   Complains of postsurgical pain.      Objective   Objective   Vital Signs  Temp:  [98.3 °F (36.8 °C)-98.4 °F (36.9 °C)] 98.4 °F (36.9 °C)  Heart Rate:  [76-94] 86  Resp:  [18] 18  BP: (146-160)/(69-80) 160/79  SpO2:  [82 %-100 %] 98 %  on  Flow (L/min):  [2] 2;   Device (Oxygen Therapy): room air  Body mass index is 32.27 kg/m².  Physical Exam  Constitutional:       General: She is not in acute  "distress.  HENT:      Head: Normocephalic.   Eyes:      Extraocular Movements: Extraocular movements intact.      Pupils: Pupils are equal, round, and reactive to light.   Cardiovascular:      Rate and Rhythm: Normal rate and regular rhythm.   Pulmonary:      Effort: Pulmonary effort is normal. No respiratory distress.   Abdominal:      General: There is no distension.      Palpations: Abdomen is soft.      Tenderness: There is no abdominal tenderness.   Neurological:      Mental Status: She is alert and oriented to person, place, and time.         Results Review     I reviewed the patient's new clinical results.  Results from last 7 days   Lab Units 10/27/23  0545 10/26/23  0509 10/25/23  2110 10/25/23  0621 10/24/23  0626   WBC 10*3/mm3  --   --   --  6.97 6.97   HEMOGLOBIN g/dL 10.6* 11.2* 12.2 10.9* 13.4   PLATELETS 10*3/mm3  --   --   --  229 232     Results from last 7 days   Lab Units 10/27/23  1131 10/26/23  0509 10/25/23  0621 10/24/23  0756   SODIUM mmol/L 136 135* 133* 139   POTASSIUM mmol/L 4.2 4.8 4.4 4.4   CHLORIDE mmol/L 105 105 105 105   CO2 mmol/L 24.4 21.0* 21.9* 24.0   BUN mg/dL 27* 35* 33* 24*   CREATININE mg/dL 0.99 1.19* 1.34* 1.00   GLUCOSE mg/dL 169* 225* 191* 208*   Estimated Creatinine Clearance: 65.4 mL/min (by C-G formula based on SCr of 0.99 mg/dL).  Results from last 7 days   Lab Units 10/24/23  0756   ALBUMIN g/dL 4.1   BILIRUBIN mg/dL 0.5   ALK PHOS U/L 97   AST (SGOT) U/L 21   ALT (SGPT) U/L 17     Results from last 7 days   Lab Units 10/27/23  1131 10/26/23  0509 10/25/23  0621 10/24/23  0756   CALCIUM mg/dL 8.7 8.7 8.7 9.6   ALBUMIN g/dL  --   --   --  4.1     No results found for: \"COVID19\"  Glucose   Date/Time Value Ref Range Status   10/27/2023 1120 154 (H) 70 - 130 mg/dL Final   10/27/2023 0617 164 (H) 70 - 130 mg/dL Final   10/26/2023 2150 238 (H) 70 - 130 mg/dL Final   10/26/2023 1640 226 (H) 70 - 130 mg/dL Final   10/26/2023 1147 225 (H) 70 - 130 mg/dL Final   10/26/2023 " 0618 198 (H) 70 - 130 mg/dL Final   10/25/2023 2200 379 (H) 70 - 130 mg/dL Final     No results found for this or any previous visit.      XR Femur 2 View Left  Narrative: LEFT FEMUR OPERATIVE X-RAY     HISTORY: Femur fracture repair.     Operative imaging provided for Dr. Ziegler during femur fracture repair.  The saved images show installation of a retrograde nail with proximal  and distal interlocking screws. Left knee arthroplasty hardware is  noted.     mGy 18    SECONDS   5 IMAGES      Impression: Operative imaging provided for Dr. Ziegler during repair of a  left femur periprosthetic fracture.     This report was finalized on 10/25/2023 4:06 PM by Dr. Arben Clemons M.D on Workstation: [email or web address] C Arm During Surgery  This procedure was auto-finalized with no dictation required.    Scheduled Medications  amLODIPine, 10 mg, Oral, Daily  aspirin, 81 mg, Oral, Q12H  ethyl alcohol, 2 swab , Nasal, Once  famotidine, 40 mg, Oral, Daily  insulin glargine, 20 Units, Subcutaneous, Nightly  insulin lispro, 2-7 Units, Subcutaneous, 4x Daily AC & at Bedtime  insulin lispro, 5 Units, Subcutaneous, TID With Meals  rosuvastatin, 40 mg, Oral, Daily  senna-docusate sodium, 2 tablet, Oral, BID  sodium chloride, 10 mL, Intravenous, Q12H  valsartan, 320 mg, Oral, Daily    Infusions  sodium chloride, 100 mL/hr, Last Rate: 100 mL/hr (10/26/23 1830)    Diet  Diet: Diabetic Diets; Consistent Carbohydrate; Texture: Regular Texture (IDDSI 7); Fluid Consistency: Thin (IDDSI 0)      Assessment/Plan     Active Hospital Problems    Diagnosis  POA    **Closed displaced comminuted fracture of shaft of left femur [S72.352A]  Yes    HTN (hypertension) [I10]  Yes    Type 2 diabetes mellitus, without long-term current use of insulin [E11.9]  Yes    HLD (hyperlipidemia) [E78.5]  Yes    Obesity (BMI 30-39.9) [E66.9]  Yes      Resolved Hospital Problems   No resolved problems to display.       58 y.o. female admitted with Closed  displaced comminuted fracture of shaft of left femur.    Acute kidney injury  Follow-up with IV fluids    Left femur fracture from mechanical fall  History of bilateral knee replacements  MN the left distal femur  Discontinue IV pain meds.     Diabetes type 2  -Hold home Jardiance and metformin  -A1c 9.0.  Started on  Lantus 20 units nightly along with lispro 5 units 3 times daily AC plus sliding scale    Hypertension  -Continue home amlodipine 10 mg.  ARB restarted     Hyperlipidemia-continue home statin    SCDs for DVT prophylaxis.  Full code.  Discussed with patient and nursing staff.  Anticipate discharge to skilled nursing facility tomorrow      Mathew Castro MD  [facility]  10/27/23  14:42 EDT    Copied parts of this note have been reviewed and updated by me on 10/27/23        Electronically signed by Mathew Castro MD at 10/27/23 3903       Mathew Castro MD at 10/26/23 8381              Name: Sasha Knapp ADMIT: 10/24/2023   : 1965  PCP: Provider, No Known    MRN: [id number] LOS: 2 days   AGE/SEX: 58 y.o. female  ROOM: [facility]     Subjective     Creatinine improved with IV fluids.  She was concerned that her incision was bloody.  Also requested her Linzess.  Pain is adequately controlled.  Objective   Objective   Vital Signs  Temp:  [97.9 °F (36.6 °C)-98.6 °F (37 °C)] 98.6 °F (37 °C)  Heart Rate:  [] 96  Resp:  [16-18] 18  BP: (152-180)/(67-99) 163/67  SpO2:  [94 %-100 %] 99 %  on  Flow (L/min):  [0-3] 2;   Device (Oxygen Therapy): nasal cannula  Body mass index is 32.27 kg/m².  Physical Exam  Constitutional:       General: She is not in acute distress.  HENT:      Head: Normocephalic.   Eyes:      Extraocular Movements: Extraocular movements intact.      Pupils: Pupils are equal, round, and reactive to light.   Cardiovascular:      Rate and Rhythm: Normal rate and regular rhythm.   Pulmonary:      Effort: Pulmonary effort is normal. No respiratory distress.  "  Abdominal:      General: There is no distension.      Palpations: Abdomen is soft.      Tenderness: There is no abdominal tenderness.   Musculoskeletal:      Comments: Left lower extremity bandage with sanguinous seepage   Neurological:      Mental Status: She is alert and oriented to person, place, and time.         Results Review     I reviewed the patient's new clinical results.  Results from last 7 days   Lab Units 10/26/23  0509 10/25/23  2110 10/25/23  0621 10/24/23  0626   WBC 10*3/mm3  --   --  6.97 6.97   HEMOGLOBIN g/dL 11.2* 12.2 10.9* 13.4   PLATELETS 10*3/mm3  --   --  229 232     Results from last 7 days   Lab Units 10/26/23  0509 10/25/23  0621 10/24/23  0756   SODIUM mmol/L 135* 133* 139   POTASSIUM mmol/L 4.8 4.4 4.4   CHLORIDE mmol/L 105 105 105   CO2 mmol/L 21.0* 21.9* 24.0   BUN mg/dL 35* 33* 24*   CREATININE mg/dL 1.19* 1.34* 1.00   GLUCOSE mg/dL 225* 191* 208*   Estimated Creatinine Clearance: 54.4 mL/min (A) (by C-G formula based on SCr of 1.19 mg/dL (H)).  Results from last 7 days   Lab Units 10/24/23  0756   ALBUMIN g/dL 4.1   BILIRUBIN mg/dL 0.5   ALK PHOS U/L 97   AST (SGOT) U/L 21   ALT (SGPT) U/L 17     Results from last 7 days   Lab Units 10/26/23  0509 10/25/23  0621 10/24/23  0756   CALCIUM mg/dL 8.7 8.7 9.6   ALBUMIN g/dL  --   --  4.1     No results found for: \"COVID19\"  Hemoglobin A1C   Date/Time Value Ref Range Status   10/24/2023 0626 9.00 (H) 4.80 - 5.60 % Final     Glucose   Date/Time Value Ref Range Status   10/26/2023 1147 225 (H) 70 - 130 mg/dL Final   10/26/2023 0618 198 (H) 70 - 130 mg/dL Final   10/25/2023 2200 379 (H) 70 - 130 mg/dL Final   10/25/2023 1523 197 (H) 70 - 130 mg/dL Final   10/25/2023 1208 132 (H) 70 - 130 mg/dL Final   10/25/2023 0746 206 (H) 70 - 130 mg/dL Final   10/24/2023 2105 146 (H) 70 - 130 mg/dL Final     No results found for this or any previous visit.      XR Femur 2 View Left  Narrative: LEFT FEMUR OPERATIVE X-RAY     HISTORY: Femur fracture " repair.     Operative imaging provided for Dr. Ziegler during femur fracture repair.  The saved images show installation of a retrograde nail with proximal  and distal interlocking screws. Left knee arthroplasty hardware is  noted.     mGy 18    SECONDS   5 IMAGES      Impression: Operative imaging provided for Dr. Ziegler during repair of a  left femur periprosthetic fracture.     This report was finalized on 10/25/2023 4:06 PM by Dr. Arben Clemons M.D on Workstation: [email or web address]     [employer] C Arm During Surgery  This procedure was auto-finalized with no dictation required.    Scheduled Medications  amLODIPine, 10 mg, Oral, Daily  aspirin, 81 mg, Oral, Q12H  ethyl alcohol, 2 swab , Nasal, Once  famotidine, 40 mg, Oral, Daily  insulin lispro, 2-7 Units, Subcutaneous, 4x Daily AC & at Bedtime  rosuvastatin, 40 mg, Oral, Daily  senna-docusate sodium, 2 tablet, Oral, BID  sodium chloride, 10 mL, Intravenous, Q12H  valsartan, 320 mg, Oral, Daily    Infusions  sodium chloride, 100 mL/hr, Last Rate: 100 mL/hr (10/26/23 0941)    Diet  Diet: Diabetic Diets; Consistent Carbohydrate; Texture: Regular Texture (IDDSI 7); Fluid Consistency: Thin (IDDSI 0)      Assessment/Plan     Active Hospital Problems    Diagnosis  POA    **Closed displaced comminuted fracture of shaft of left femur [S72.352A]  Yes    HTN (hypertension) [I10]  Yes    Type 2 diabetes mellitus, without long-term current use of insulin [E11.9]  Yes    HLD (hyperlipidemia) [E78.5]  Yes    Obesity (BMI 30-39.9) [E66.9]  Yes      Resolved Hospital Problems   No resolved problems to display.       58 y.o. female admitted with Closed displaced comminuted fracture of shaft of left femur.    Acute kidney injury  Improving with IV fluids.    Left femur fracture from mechanical fall  History of bilateral knee replacements  MN the left distal femur  -Oral and IV pain meds     Diabetes type 2  -Hold home Jardiance and metformin  -A1c 9.0.  Starting Lantus 20 units nightly  along with lispro 5 units 3 times daily AC plus sliding scale    Hypertension  -Continue home amlodipine 10 mg.  ARB restarted     Hyperlipidemia-continue home statin    SCDs for DVT prophylaxis.  Full code.  Discussed with patient and nursing staff.  Anticipate discharge home with  vs SNU facility in 2-3 days.      Mathew Castro MD  [facility]  10/26/23  15:15 EDT    Copied text on this note has been reviewed and updated as of 10/26/23        Electronically signed by Mathew Castro MD at 10/26/23 1520       Rashad Reza, APRN at 10/26/23 0702       Attestation signed by Thong Torres MD at 10/26/23 0734    I have reviewed this documentation examined the patient and agree with above.  OK to remove knee immobilizer when in bed or chair.  Ok to perform ROM exercises when in bed or chair.  To wear knee immobilizer when performing transfers and ambulating.  Will need rehab placement as has no family in [place] area.  Ok to transfer to rehab when bed available.  Change dressing in 1 week or sooner if needed if dressing falling off or saturated.  Keep incisions covered until staple removal in office.  Aspirin 81 mg po bid for 4 weeks.  Will sign off.  Follow up in the office in 2 weeks.  Office phone #[phone].      Thong Torres MD                    Procedure(s):  LEFT FEMUR INTRAMEDULLARY NAILING RETROGRADE     LOS: 2 days     Subjective :   Patient seen and examined.  Resting comfortably.  Alert, responding appropriately to questions.  Pain controlled.  Denies any new problems overnight.      Objective :    Vital signs in last 24 hours:  Vitals:    10/25/23 1700 10/25/23 1715 10/25/23 1900 10/25/23 2300   BP: 177/69 163/75 167/88 168/86   BP Location:  Right arm Right arm Right arm   Patient Position:  Lying Lying Lying   Pulse: 84 86 95 104   Resp: 16 16 16 16   Temp:  98 °F (36.7 °C) 98.4 °F (36.9 °C) 98.6 °F (37 °C)   TempSrc:  Oral Oral Oral   SpO2: 98% 99% 98% 99%   Weight:        Height:           PHYSICAL EXAM:  Patient is calm, in no acute distress, awake and oriented x 3.  Dressing is clean, dry and intact.  No signs of infection.  Swelling is appropriate in amount.  Ecchymosis is appropriate in amount.  EHL, FHL, TA, GS intact.  Patient is neurovascularly intact distally.    LABS:  Results from last 7 days   Lab Units 10/26/23  0509 10/25/23  2110 10/25/23  0621   WBC 10*3/mm3  --   --  6.97   HEMOGLOBIN g/dL 11.2*   < > 10.9*   HEMATOCRIT % 34.0   < > 33.6*   PLATELETS 10*3/mm3  --   --  229    < > = values in this interval not displayed.     Results from last 7 days   Lab Units 10/26/23  0509   SODIUM mmol/L 135*   POTASSIUM mmol/L 4.8   CHLORIDE mmol/L 105   CO2 mmol/L 21.0*   BUN mg/dL 35*   CREATININE mg/dL 1.19*   GLUCOSE mg/dL 225*   CALCIUM mg/dL 8.7     Results from last 7 days   Lab Units 10/24/23  0856   INR  1.00         ASSESSMENT:  Status post Procedure(s):  LEFT FEMUR INTRAMEDULLARY NAILING RETROGRADE      Plan:  Continue Physical Therapy, increase mobility  NWB to the operative extremity    Continue SCDs & DVT prophylaxis  Aspirin 81 mg BID for VTE chemoprophylaxis    Surgical dressings to be left in place x1 week.  Then can be removed and replaced with daily dry dressings.  Okay to shower.  Do not submerge incisions.    Dispo planning per primary team for home with home health and family assistance versus SNF, depending on PT eval and patient safety needs    Plan for routine postoperative follow-up in our office in 2 weeks for x-ray, wound assessment, and staple removal.    Plan of care above was discussed with the patient at bedside this morning.  She stated understanding and was agreeable.  She was given opportunity ask questions and all questions were answered to her satisfaction.    Rashad Reza, ANTHONY    Date: 10/26/2023  Time: 07:02 EDT      Electronically signed by Thong Torres MD at 10/26/23 0736       Consult Notes (last 48 hours)  Notes from 10/25/23  1457 through 10/27/23 6986   No notes of this type exist for this encounter.

## 2023-10-28 VITALS
RESPIRATION RATE: 16 BRPM | SYSTOLIC BLOOD PRESSURE: 127 MMHG | HEART RATE: 80 BPM | TEMPERATURE: 97.9 F | OXYGEN SATURATION: 97 % | HEIGHT: 64 IN | BODY MASS INDEX: 32.1 KG/M2 | DIASTOLIC BLOOD PRESSURE: 58 MMHG | WEIGHT: 188 LBS

## 2023-10-28 LAB
GLUCOSE BLDC GLUCOMTR-MCNC: 164 MG/DL (ref 70–130)
GLUCOSE BLDC GLUCOMTR-MCNC: 270 MG/DL (ref 70–130)
HCT VFR BLD AUTO: 30 % (ref 34–46.6)
HGB BLD-MCNC: 9.8 G/DL (ref 12–15.9)

## 2023-10-28 PROCEDURE — 85018 HEMOGLOBIN: CPT | Performed by: ORTHOPAEDIC SURGERY

## 2023-10-28 PROCEDURE — 82948 REAGENT STRIP/BLOOD GLUCOSE: CPT

## 2023-10-28 PROCEDURE — 63710000001 INSULIN LISPRO (HUMAN) PER 5 UNITS: Performed by: STUDENT IN AN ORGANIZED HEALTH CARE EDUCATION/TRAINING PROGRAM

## 2023-10-28 PROCEDURE — 85014 HEMATOCRIT: CPT | Performed by: ORTHOPAEDIC SURGERY

## 2023-10-28 PROCEDURE — 63710000001 INSULIN LISPRO (HUMAN) PER 5 UNITS: Performed by: ORTHOPAEDIC SURGERY

## 2023-10-28 RX ORDER — HYDROCODONE BITARTRATE AND ACETAMINOPHEN 10; 325 MG/1; MG/1
1 TABLET ORAL EVERY 4 HOURS PRN
Qty: 15 TABLET | Refills: 0 | Status: SHIPPED | OUTPATIENT
Start: 2023-10-28 | End: 2023-11-01

## 2023-10-28 RX ORDER — NALOXONE HYDROCHLORIDE 4 MG/.1ML
SPRAY NASAL
Qty: 2 EACH | Refills: 0 | Status: SHIPPED | OUTPATIENT
Start: 2023-10-28

## 2023-10-28 RX ORDER — ASPIRIN 81 MG/1
81 TABLET ORAL EVERY 12 HOURS SCHEDULED
Qty: 28 TABLET | Refills: 0 | Status: SHIPPED | OUTPATIENT
Start: 2023-10-28

## 2023-10-28 RX ORDER — FAMOTIDINE 40 MG/1
40 TABLET, FILM COATED ORAL DAILY
Qty: 30 TABLET | Refills: 0 | Status: SHIPPED | OUTPATIENT
Start: 2023-10-29

## 2023-10-28 RX ORDER — POLYETHYLENE GLYCOL 3350 17 G/17G
17 POWDER, FOR SOLUTION ORAL DAILY PRN
Qty: 30 EACH | Refills: 0 | Status: SHIPPED | OUTPATIENT
Start: 2023-10-28

## 2023-10-28 RX ADMIN — INSULIN LISPRO 5 UNITS: 100 INJECTION, SOLUTION INTRAVENOUS; SUBCUTANEOUS at 12:20

## 2023-10-28 RX ADMIN — HYDROCODONE BITARTRATE AND ACETAMINOPHEN 2 TABLET: 10; 325 TABLET ORAL at 13:39

## 2023-10-28 RX ADMIN — FAMOTIDINE 40 MG: 20 TABLET, FILM COATED ORAL at 08:28

## 2023-10-28 RX ADMIN — INSULIN LISPRO 2 UNITS: 100 INJECTION, SOLUTION INTRAVENOUS; SUBCUTANEOUS at 12:20

## 2023-10-28 RX ADMIN — ASPIRIN 81 MG: 81 TABLET, COATED ORAL at 08:28

## 2023-10-28 RX ADMIN — DOCUSATE SODIUM 50MG AND SENNOSIDES 8.6MG 2 TABLET: 8.6; 5 TABLET, FILM COATED ORAL at 08:28

## 2023-10-28 RX ADMIN — HYDROCODONE BITARTRATE AND ACETAMINOPHEN 2 TABLET: 10; 325 TABLET ORAL at 08:36

## 2023-10-28 RX ADMIN — VALSARTAN 320 MG: 320 TABLET, FILM COATED ORAL at 08:28

## 2023-10-28 RX ADMIN — INSULIN LISPRO 5 UNITS: 100 INJECTION, SOLUTION INTRAVENOUS; SUBCUTANEOUS at 08:45

## 2023-10-28 RX ADMIN — INSULIN LISPRO 2 UNITS: 100 INJECTION, SOLUTION INTRAVENOUS; SUBCUTANEOUS at 08:28

## 2023-10-28 RX ADMIN — ROSUVASTATIN CALCIUM 40 MG: 40 TABLET, FILM COATED ORAL at 08:28

## 2023-10-28 RX ADMIN — Medication 10 ML: at 09:23

## 2023-10-28 RX ADMIN — AMLODIPINE BESYLATE 10 MG: 10 TABLET ORAL at 08:28

## 2023-10-28 NOTE — DISCHARGE SUMMARY
Patient Name: Sasha Knapp  : 1965  MRN: [id number]    Date of Admission: 10/24/2023  Date of Discharge:  10/28/2023  Primary Care Physician: Provider, No Known      Chief Complaint:   Leg Injury      Discharge Diagnoses     Active Hospital Problems    Diagnosis  POA    **Closed displaced comminuted fracture of shaft of left femur [S72.352A]  Yes    HTN (hypertension) [I10]  Yes    Type 2 diabetes mellitus, without long-term current use of insulin [E11.9]  Yes    HLD (hyperlipidemia) [E78.5]  Yes    Obesity (BMI 30-39.9) [E66.9]  Yes      Resolved Hospital Problems   No resolved problems to display.        Hospital Course     This is a 58-year-old woman with a history of hypertension, hyperlipidemia, diabetes who came to the hospital after experiencing a mechanical fall down a flight of stairs.  She experienced pain in her left lower knee and was noted to have a closed displaced commuted fracture of the shaft of the left femur.  Surgery was consulted and she underwent retrograde intramedullary nailing of the left distal femur.  She experienced an appropriate postoperative hemoglobin decrease.  Otherwise she is doing well and stable for discharge to a skilled rehabilitation facility.     Physical Exam:  Temp:  [97.9 °F (36.6 °C)-98.6 °F (37 °C)] 98.3 °F (36.8 °C)  Heart Rate:  [0-104] 95  Resp:  [16-18] 16  BP: (133-160)/(64-79) 154/75  Body mass index is 32.27 kg/m².  Physical Exam  Constitutional:       General: She is not in acute distress.  HENT:      Head: Normocephalic and atraumatic.   Eyes:      Extraocular Movements: Extraocular movements intact.   Cardiovascular:      Rate and Rhythm: Normal rate and regular rhythm.   Abdominal:      General: Abdomen is flat. There is no distension.      Tenderness: There is no abdominal tenderness.   Skin:     General: Skin is warm and dry.   Neurological:      General: No focal deficit present.      Mental Status: She is alert and oriented to person,  place, and time.         Consultants     Consult Orders (all) (From admission, onward)       Start     Ordered    10/25/23 1805  Inpatient Case Management  Consult  Once        Provider:  (Not yet assigned)    10/25/23 1804    10/24/23 0944  Inpatient Case Management  Consult  Once        Provider:  (Not yet assigned)    10/24/23 0944    10/24/23 0944  Inpatient Diabetes Educator Consult  Once        Provider:  (Not yet assigned)    10/24/23 0944    10/24/23 0715  Ortho (on-call MD unless specified)  Once        Specialty:  Orthopedic Surgery  Provider:  Heri Bowen MD    10/24/23 0714    10/24/23 0715  [facility] (on-call MD unless specified) Details  Once        Specialty:  Hospitalist  Provider:  Mu Esteves MD    10/24/23 0714                  Procedures     Imaging Results (All)       Procedure Component Value Units Date/Time    XR Femur 2 View Left [[id number]] Collected: 10/25/23 1600     Updated: 10/25/23 1609    Narrative:      LEFT FEMUR OPERATIVE X-RAY     HISTORY: Femur fracture repair.     Operative imaging provided for Dr. Ziegler during femur fracture repair.  The saved images show installation of a retrograde nail with proximal  and distal interlocking screws. Left knee arthroplasty hardware is  noted.     mGy 18    SECONDS   5 IMAGES        Impression:      Operative imaging provided for Dr. Ziegler during repair of a  left femur periprosthetic fracture.     This report was finalized on 10/25/2023 4:06 PM by Dr. Arben Clemons M.D on Workstation: [id number]       FL C Arm During Surgery [[id number]] Resulted: 10/25/23 1456     Updated: 10/25/23 1456    Narrative:      This procedure was auto-finalized with no dictation required.    CT Lower Extremity Left Without Contrast [[id number]] Collected: 10/24/23 1006     Updated: 10/24/23 1138    Narrative:      CT LEFT FEMUR WITHOUT CONTRAST     HISTORY: Evaluate periprosthetic distal femur fracture.     TECHNIQUE:  Axial noncontrast left femur CT from proximal to the mid  femoral shaft level to below the knee is provided and correlated with  femur and hip x-rays from earlier this morning. Radiation dose reduction  techniques were utilized, including automated exposure control and  exposure modulation based on body size.     FINDINGS: There is a total knee arthroplasty hardware and a comminuted,  displaced distal femoral diametaphysis fracture with posterior  displacement of the distal femoral fragment a full bone width. The  distal portion of the fracture extends to the upper edge of the femoral  component anteriorly. The fracture planes do not appear to extend beyond  that level. The patella, proximal tibia and fibula are intact. There is  no evidence of underlying bone lesion.     The significant posterior displacement of the knee relative to the  proximal femur fracture fragment significantly deforms to the quadriceps  tendon bundle but that structure appears to remain intact.       Impression:      Comminuted distal left femoral diametaphysis fracture  extends to the level of the femoral component anteriorly along the  midline. The remainder of the fracture appears to terminate at the level  of the metaphysis.     This report was finalized on 10/24/2023 11:35 AM by Dr. Arben Clemons M.D on Workstation: [id number]       XR Chest 1 View [[id number]] Collected: 10/24/23 0659     Updated: 10/24/23 0704    Narrative:      XR CHEST 1 VW-, XR FEMUR 2 VW LEFT-, XR HIP W OR WO PELVIS 2-3 VIEW  LEFT-     HISTORY: Female who is 58 years-old, trauma     TECHNIQUE: Frontal view of the chest, 3 views of the left femur, frontal  view of the pelvis, 2 views of the left hip     COMPARISON: None available     FINDINGS:     Chest x-ray: Heart, mediastinum and pulmonary vasculature are  unremarkable. No focal pulmonary consolidation, pleural effusion, or  pneumothorax. No acute osseous process.     Pelvis, left hip, left femur: A  comminuted, angulated, displaced  fracture involving the distal left femoral metaphysis is noted, just  above the left knee arthroplasty hardware. The main distal fracture  fragment is posteriorly displaced by about 1 shaft width. No other  fractures are identified. No dislocation. Hip joint spaces appear  preserved.       Impression:      As described.     This report was finalized on 10/24/2023 7:01 AM by Dr. Irwin Skinner M.D on Workstation: [id number]       XR Hip With or Without Pelvis 2 - 3 View Left [[id number]] Collected: 10/24/23 0659     Updated: 10/24/23 0704    Narrative:      XR CHEST 1 VW-, XR FEMUR 2 VW LEFT-, XR HIP W OR WO PELVIS 2-3 VIEW  LEFT-     HISTORY: Female who is 58 years-old, trauma     TECHNIQUE: Frontal view of the chest, 3 views of the left femur, frontal  view of the pelvis, 2 views of the left hip     COMPARISON: None available     FINDINGS:     Chest x-ray: Heart, mediastinum and pulmonary vasculature are  unremarkable. No focal pulmonary consolidation, pleural effusion, or  pneumothorax. No acute osseous process.     Pelvis, left hip, left femur: A comminuted, angulated, displaced  fracture involving the distal left femoral metaphysis is noted, just  above the left knee arthroplasty hardware. The main distal fracture  fragment is posteriorly displaced by about 1 shaft width. No other  fractures are identified. No dislocation. Hip joint spaces appear  preserved.       Impression:      As described.     This report was finalized on 10/24/2023 7:01 AM by Dr. Irwin Skinner M.D on Workstation: [id number]       XR Femur 2 View Left [[id number]] Collected: 10/24/23 0659     Updated: 10/24/23 0704    Narrative:      XR CHEST 1 VW-, XR FEMUR 2 VW LEFT-, XR HIP W OR WO PELVIS 2-3 VIEW  LEFT-     HISTORY: Female who is 58 years-old, trauma     TECHNIQUE: Frontal view of the chest, 3 views of the left femur, frontal  view of the pelvis, 2 views of the left hip     COMPARISON: None  "available     FINDINGS:     Chest x-ray: Heart, mediastinum and pulmonary vasculature are  unremarkable. No focal pulmonary consolidation, pleural effusion, or  pneumothorax. No acute osseous process.     Pelvis, left hip, left femur: A comminuted, angulated, displaced  fracture involving the distal left femoral metaphysis is noted, just  above the left knee arthroplasty hardware. The main distal fracture  fragment is posteriorly displaced by about 1 shaft width. No other  fractures are identified. No dislocation. Hip joint spaces appear  preserved.       Impression:      As described.     This report was finalized on 10/24/2023 7:01 AM by Dr. Irwin Skinner M.D on Workstation: [id number]               Pertinent Labs     Results from last 7 days   Lab Units 10/28/23  0618 10/27/23  0545 10/26/23  0509 10/25/23  2110 10/25/23  0621 10/24/23  0626   WBC 10*3/mm3  --   --   --   --  6.97 6.97   HEMOGLOBIN g/dL 9.8* 10.6* 11.2* 12.2 10.9* 13.4   PLATELETS 10*3/mm3  --   --   --   --  229 232     Results from last 7 days   Lab Units 10/27/23  1131 10/26/23  0509 10/25/23  0621 10/24/23  0756   SODIUM mmol/L 136 135* 133* 139   POTASSIUM mmol/L 4.2 4.8 4.4 4.4   CHLORIDE mmol/L 105 105 105 105   CO2 mmol/L 24.4 21.0* 21.9* 24.0   BUN mg/dL 27* 35* 33* 24*   CREATININE mg/dL 0.99 1.19* 1.34* 1.00   GLUCOSE mg/dL 169* 225* 191* 208*   Estimated Creatinine Clearance: 65.4 mL/min (by C-G formula based on SCr of 0.99 mg/dL).  Results from last 7 days   Lab Units 10/24/23  0756   ALBUMIN g/dL 4.1   BILIRUBIN mg/dL 0.5   ALK PHOS U/L 97   AST (SGOT) U/L 21   ALT (SGPT) U/L 17     Results from last 7 days   Lab Units 10/27/23  1131 10/26/23  0509 10/25/23  0621 10/24/23  0756   CALCIUM mg/dL 8.7 8.7 8.7 9.6   ALBUMIN g/dL  --   --   --  4.1               Invalid input(s): \"LDLCALC\"        Test Results Pending at Discharge       Discharge Details        Discharge Medications        New Medications        Instructions Start Date "   aspirin 81 MG EC tablet   81 mg, Oral, Every 12 Hours Scheduled      famotidine 40 MG tablet  Commonly known as: PEPCID   40 mg, Oral, Daily   Start Date: October 29, 2023     HYDROcodone-acetaminophen  MG per tablet  Commonly known as: NORCO   1 tablet, Oral, Every 4 Hours PRN      naloxone 4 MG/0.1ML nasal spray  Commonly known as: NARCAN   Call 911. Don't prime. [place] in 1 nostril for overdose. Repeat in 2-3 minutes in other nostril if no or minimal breathing/responsiveness.      polyethylene glycol 17 g packet  Commonly known as: MIRALAX   17 g, Oral, Daily PRN             Continue These Medications        Instructions Start Date   amLODIPine 10 MG tablet  Commonly known as: NORVASC   10 mg, Oral, Daily      Jardiance 25 MG tablet tablet  Generic drug: empagliflozin   25 mg, Oral, Daily      linaclotide 290 MCG capsule capsule  Commonly known as: LINZESS   145 mcg, Oral, Every Morning Before Breakfast      metFORMIN 500 MG tablet  Commonly known as: GLUCOPHAGE   500 mg, Oral, 2 Times Daily With Meals      rosuvastatin 40 MG tablet  Commonly known as: CRESTOR   40 mg, Oral, Daily      SITagliptin 50 MG tablet 50 mg, metFORMIN 1000 MG tablet 1,000 mg   50-1,000 doses, Oral, 2 Times Daily With Meals      valsartan 160 MG tablet  Commonly known as: DIOVAN   320 mg, Oral, Daily               No Known Allergies      Discharge Disposition:  Skilled Nursing Facility (DC - External)    Discharge Diet:  Diet Order   Procedures    Diet: Diabetic Diets; Consistent Carbohydrate; Texture: Regular Texture (IDDSI 7); Fluid Consistency: Thin (IDDSI 0)       Discharge Activity:       CODE STATUS:    Code Status and Medical Interventions:   Ordered at: 10/24/23 0811     Code Status (Patient has no pulse and is not breathing):    CPR (Attempt to Resuscitate)     Medical Interventions (Patient has pulse or is breathing):    Full Support       No future appointments.   Contact information for follow-up providers       Brian  MD Thong. Schedule an appointment as soon as possible for a visit in 2 week(s).    Specialty: Orthopedic Surgery  Why: For wound re-check, For suture removal, x-rays    Please call our office at your earliest convenience during normal business hours to schedule your follow-up appointment for 2 weeks from the date of your surgery  Contact information:  [address] [facility] [address] [phone]               Provider, No Known .    Contact information:  [facility] [address]                       Contact information for after-discharge care       Destination       [facility] .    Service: Skilled Nursing  Contact information:  [address] [facility]   [place] [address] [phone]                                   Time Spent on Discharge:  Greater than 30 minutes      Mathew Castro MD  [facility]  10/28/23  11:21 EDT

## 2023-10-28 NOTE — PLAN OF CARE
Goal Outcome Evaluation:           Progress: improving  Outcome Evaluation: Pain medication as needed.  Up to BSC with asst of two, NWB LLE.  moderate sized BM this shift after suppos given.  Plan rehab 10/28/23

## 2023-10-30 NOTE — PAYOR COMM NOTE
"Adair Rea (58 y.o. Female)     PLEASE SEE ATTACHED FOR DC NOTICE    REF #   [id number]     THANK YOU  [facility]AMA RN/UM DEPT  [facility]   [phone]  [phone]        Date of Birth   1965    Social Security Number       Address   [address] [place] [address]    Home Phone   [phone]    MRN   [id number]       Jew   [facility]    Marital Status   Single                            Admission Date   10/24/23    Admission Type   Emergency    Admitting Provider   Mu Esteves MD    Attending Provider       Department, Room/Bed   [facility], S513/1       Discharge Date   10/28/2023    Discharge Disposition   Skilled Nursing Facility (DC - External)    Discharge Destination                                 Attending Provider: (none)   Allergies: No Known Allergies    Isolation: None   Infection: None   Code Status: Prior    Ht: 162.6 cm (64\")   Wt: 85.3 kg (188 lb)    Admission Cmt: None   Principal Problem: Closed displaced comminuted fracture of shaft of left femur [S72.352A]                   Active Insurance as of 10/24/2023       Primary Coverage       Payor Plan Insurance Group Employer/Plan Group    ANTHEM BLUE CROSS ANTHEM BLUE CROSS BLUE SHIELD PPO [id number]       Payor Plan Address Payor Plan Phone Number Payor Plan Fax Number Effective Dates    [address] [phone]  1/1/2023 - None Entered    [facility] [id number]         Subscriber Name Subscriber Birth Date Member ID       ADAIR REA 1965 [id number]               Secondary Coverage       Payor Plan Insurance Group Employer/Plan Group    [place] [id number]       Payor Plan Address Payor Plan Phone Number Payor Plan Fax Number Effective Dates    [employer] [address] [address] [phone]  1/1/2021 - None Entered    [facility] [id number]         Subscriber Name Subscriber Birth Date Member ID       ADAIR REA 1965 " [id number]                     Emergency Contacts        (Rel.) Home Phone Work Phone Mobile Phone    Edis Najera (Brother) -- -- [phone]              [facility] [id number]  Tax ID [id number]     Discharge Summary        Mathew Castro MD at 10/28/23 1121              Patient Name: Sasha Knapp  : 1965  MRN: [id number]    Date of Admission: 10/24/2023  Date of Discharge:  10/28/2023  Primary Care Physician: Provider, No Known      Chief Complaint:   Leg Injury      Discharge Diagnoses     Active Hospital Problems    Diagnosis  POA    **Closed displaced comminuted fracture of shaft of left femur [S72.352A]  Yes    HTN (hypertension) [I10]  Yes    Type 2 diabetes mellitus, without long-term current use of insulin [E11.9]  Yes    HLD (hyperlipidemia) [E78.5]  Yes    Obesity (BMI 30-39.9) [E66.9]  Yes      Resolved Hospital Problems   No resolved problems to display.        Hospital Course     This is a 58-year-old woman with a history of hypertension, hyperlipidemia, diabetes who came to the hospital after experiencing a mechanical fall down a flight of stairs.  She experienced pain in her left lower knee and was noted to have a closed displaced commuted fracture of the shaft of the left femur.  Surgery was consulted and she underwent retrograde intramedullary nailing of the left distal femur.  She experienced an appropriate postoperative hemoglobin decrease.  Otherwise she is doing well and stable for discharge to a skilled rehabilitation facility.     Physical Exam:  Temp:  [97.9 °F (36.6 °C)-98.6 °F (37 °C)] 98.3 °F (36.8 °C)  Heart Rate:  [0-104] 95  Resp:  [16-18] 16  BP: (133-160)/(64-79) 154/75  Body mass index is 32.27 kg/m².  Physical Exam  Constitutional:       General: She is not in acute distress.  HENT:      Head: Normocephalic and atraumatic.   Eyes:      Extraocular Movements: Extraocular movements intact.   Cardiovascular:      Rate and Rhythm: Normal rate and  regular rhythm.   Abdominal:      General: Abdomen is flat. There is no distension.      Tenderness: There is no abdominal tenderness.   Skin:     General: Skin is warm and dry.   Neurological:      General: No focal deficit present.      Mental Status: She is alert and oriented to person, place, and time.         Consultants     Consult Orders (all) (From admission, onward)       Start     Ordered    10/25/23 1805  Inpatient Case Management  Consult  Once        Provider:  (Not yet assigned)    10/25/23 1804    10/24/23 0944  Inpatient Case Management  Consult  Once        Provider:  (Not yet assigned)    10/24/23 0944    10/24/23 0944  Inpatient Diabetes Educator Consult  Once        Provider:  (Not yet assigned)    10/24/23 0944    10/24/23 0715  Ortho (on-call MD unless specified)  Once        Specialty:  Orthopedic Surgery  Provider:  Heri Bowen MD    10/24/23 0714    10/24/23 0715  [facility] (on-call MD unless specified) Details  Once        Specialty:  Hospitalist  Provider:  Mu Esteves MD    10/24/23 0714                  Procedures     Imaging Results (All)       Procedure Component Value Units Date/Time    XR Femur 2 View Left [[id number]] Collected: 10/25/23 1600     Updated: 10/25/23 1609    Narrative:      LEFT FEMUR OPERATIVE X-RAY     HISTORY: Femur fracture repair.     Operative imaging provided for Dr. Ziegler during femur fracture repair.  The saved images show installation of a retrograde nail with proximal  and distal interlocking screws. Left knee arthroplasty hardware is  noted.     mGy 18    SECONDS   5 IMAGES        Impression:      Operative imaging provided for Dr. Ziegler during repair of a  left femur periprosthetic fracture.     This report was finalized on 10/25/2023 4:06 PM by Dr. Arben Clemons M.D on Workstation: [id number]       FL C Arm During Surgery [[id number]] Resulted: 10/25/23 1456     Updated: 10/25/23 1456    Narrative:      This  procedure was auto-finalized with no dictation required.    CT Lower Extremity Left Without Contrast [[id number]] Collected: 10/24/23 1006     Updated: 10/24/23 1138    Narrative:      CT LEFT FEMUR WITHOUT CONTRAST     HISTORY: Evaluate periprosthetic distal femur fracture.     TECHNIQUE: Axial noncontrast left femur CT from proximal to the mid  femoral shaft level to below the knee is provided and correlated with  femur and hip x-rays from earlier this morning. Radiation dose reduction  techniques were utilized, including automated exposure control and  exposure modulation based on body size.     FINDINGS: There is a total knee arthroplasty hardware and a comminuted,  displaced distal femoral diametaphysis fracture with posterior  displacement of the distal femoral fragment a full bone width. The  distal portion of the fracture extends to the upper edge of the femoral  component anteriorly. The fracture planes do not appear to extend beyond  that level. The patella, proximal tibia and fibula are intact. There is  no evidence of underlying bone lesion.     The significant posterior displacement of the knee relative to the  proximal femur fracture fragment significantly deforms to the quadriceps  tendon bundle but that structure appears to remain intact.       Impression:      Comminuted distal left femoral diametaphysis fracture  extends to the level of the femoral component anteriorly along the  midline. The remainder of the fracture appears to terminate at the level  of the metaphysis.     This report was finalized on 10/24/2023 11:35 AM by Dr. Arben Clemons M.D on Workstation: [id number]       XR Chest 1 View [[id number]] Collected: 10/24/23 0659     Updated: 10/24/23 0704    Narrative:      XR CHEST 1 VW-, XR FEMUR 2 VW LEFT-, XR HIP W OR WO PELVIS 2-3 VIEW  LEFT-     HISTORY: Female who is 58 years-old, trauma     TECHNIQUE: Frontal view of the chest, 3 views of the left femur, frontal  view of the pelvis, 2  views of the left hip     COMPARISON: None available     FINDINGS:     Chest x-ray: Heart, mediastinum and pulmonary vasculature are  unremarkable. No focal pulmonary consolidation, pleural effusion, or  pneumothorax. No acute osseous process.     Pelvis, left hip, left femur: A comminuted, angulated, displaced  fracture involving the distal left femoral metaphysis is noted, just  above the left knee arthroplasty hardware. The main distal fracture  fragment is posteriorly displaced by about 1 shaft width. No other  fractures are identified. No dislocation. Hip joint spaces appear  preserved.       Impression:      As described.     This report was finalized on 10/24/2023 7:01 AM by Dr. Irwin Skinner M.D on Workstation: [id number]       XR Hip With or Without Pelvis 2 - 3 View Left [[id number]] Collected: 10/24/23 0659     Updated: 10/24/23 0704    Narrative:      XR CHEST 1 VW-, XR FEMUR 2 VW LEFT-, XR HIP W OR WO PELVIS 2-3 VIEW  LEFT-     HISTORY: Female who is 58 years-old, trauma     TECHNIQUE: Frontal view of the chest, 3 views of the left femur, frontal  view of the pelvis, 2 views of the left hip     COMPARISON: None available     FINDINGS:     Chest x-ray: Heart, mediastinum and pulmonary vasculature are  unremarkable. No focal pulmonary consolidation, pleural effusion, or  pneumothorax. No acute osseous process.     Pelvis, left hip, left femur: A comminuted, angulated, displaced  fracture involving the distal left femoral metaphysis is noted, just  above the left knee arthroplasty hardware. The main distal fracture  fragment is posteriorly displaced by about 1 shaft width. No other  fractures are identified. No dislocation. Hip joint spaces appear  preserved.       Impression:      As described.     This report was finalized on 10/24/2023 7:01 AM by Dr. Irwin Skinner M.D on Workstation: [id number]       XR Femur 2 View Left [[id number]] Collected: 10/24/23 0659     Updated: 10/24/23 0704    Narrative:       XR CHEST 1 VW-, XR FEMUR 2 VW LEFT-, XR HIP W OR WO PELVIS 2-3 VIEW  LEFT-     HISTORY: Female who is 58 years-old, trauma     TECHNIQUE: Frontal view of the chest, 3 views of the left femur, frontal  view of the pelvis, 2 views of the left hip     COMPARISON: None available     FINDINGS:     Chest x-ray: Heart, mediastinum and pulmonary vasculature are  unremarkable. No focal pulmonary consolidation, pleural effusion, or  pneumothorax. No acute osseous process.     Pelvis, left hip, left femur: A comminuted, angulated, displaced  fracture involving the distal left femoral metaphysis is noted, just  above the left knee arthroplasty hardware. The main distal fracture  fragment is posteriorly displaced by about 1 shaft width. No other  fractures are identified. No dislocation. Hip joint spaces appear  preserved.       Impression:      As described.     This report was finalized on 10/24/2023 7:01 AM by Dr. Irwin Skinner M.D on Workstation: [id number]               Pertinent Labs     Results from last 7 days   Lab Units 10/28/23  0618 10/27/23  0545 10/26/23  0509 10/25/23  2110 10/25/23  0621 10/24/23  0626   WBC 10*3/mm3  --   --   --   --  6.97 6.97   HEMOGLOBIN g/dL 9.8* 10.6* 11.2* 12.2 10.9* 13.4   PLATELETS 10*3/mm3  --   --   --   --  229 232     Results from last 7 days   Lab Units 10/27/23  1131 10/26/23  0509 10/25/23  0621 10/24/23  0756   SODIUM mmol/L 136 135* 133* 139   POTASSIUM mmol/L 4.2 4.8 4.4 4.4   CHLORIDE mmol/L 105 105 105 105   CO2 mmol/L 24.4 21.0* 21.9* 24.0   BUN mg/dL 27* 35* 33* 24*   CREATININE mg/dL 0.99 1.19* 1.34* 1.00   GLUCOSE mg/dL 169* 225* 191* 208*   Estimated Creatinine Clearance: 65.4 mL/min (by C-G formula based on SCr of 0.99 mg/dL).  Results from last 7 days   Lab Units 10/24/23  0756   ALBUMIN g/dL 4.1   BILIRUBIN mg/dL 0.5   ALK PHOS U/L 97   AST (SGOT) U/L 21   ALT (SGPT) U/L 17     Results from last 7 days   Lab Units 10/27/23  1131 10/26/23  0509 10/25/23  0621  "10/24/23  0756   CALCIUM mg/dL 8.7 8.7 8.7 9.6   ALBUMIN g/dL  --   --   --  4.1               Invalid input(s): \"LDLCALC\"        Test Results Pending at Discharge       Discharge Details        Discharge Medications        New Medications        Instructions Start Date   aspirin 81 MG EC tablet   81 mg, Oral, Every 12 Hours Scheduled      famotidine 40 MG tablet  Commonly known as: PEPCID   40 mg, Oral, Daily   Start Date: October 29, 2023     HYDROcodone-acetaminophen  MG per tablet  Commonly known as: NORCO   1 tablet, Oral, Every 4 Hours PRN      naloxone 4 MG/0.1ML nasal spray  Commonly known as: NARCAN   Call 911. Don't prime. Perkins in 1 nostril for overdose. Repeat in 2-3 minutes in other nostril if no or minimal breathing/responsiveness.      polyethylene glycol 17 g packet  Commonly known as: MIRALAX   17 g, Oral, Daily PRN             Continue These Medications        Instructions Start Date   amLODIPine 10 MG tablet  Commonly known as: NORVASC   10 mg, Oral, Daily      Jardiance 25 MG tablet tablet  Generic drug: empagliflozin   25 mg, Oral, Daily      linaclotide 290 MCG capsule capsule  Commonly known as: LINZESS   145 mcg, Oral, Every Morning Before Breakfast      metFORMIN 500 MG tablet  Commonly known as: GLUCOPHAGE   500 mg, Oral, 2 Times Daily With Meals      rosuvastatin 40 MG tablet  Commonly known as: CRESTOR   40 mg, Oral, Daily      SITagliptin 50 MG tablet 50 mg, metFORMIN 1000 MG tablet 1,000 mg   50-1,000 doses, Oral, 2 Times Daily With Meals      valsartan 160 MG tablet  Commonly known as: DIOVAN   320 mg, Oral, Daily               No Known Allergies      Discharge Disposition:  Skilled Nursing Facility (DC - External)    Discharge Diet:  Diet Order   Procedures    Diet: Diabetic Diets; Consistent Carbohydrate; Texture: Regular Texture (IDDSI 7); Fluid Consistency: Thin (IDDSI 0)       Discharge Activity:       CODE STATUS:    Code Status and Medical Interventions:   Ordered at: " 10/24/23 0811     Code Status (Patient has no pulse and is not breathing):    CPR (Attempt to Resuscitate)     Medical Interventions (Patient has pulse or is breathing):    Full Support       No future appointments.   Contact information for follow-up providers       Thong Torres MD. Schedule an appointment as soon as possible for a visit in 2 week(s).    Specialty: Orthopedic Surgery  Why: For wound re-check, For suture removal, x-rays    Please call our office at your earliest convenience during normal business hours to schedule your follow-up appointment for 2 weeks from the date of your surgery  Contact information:  [address] [facility] [address] [phone]               Provider, No Known .    Contact information:  [facility] [address]                       Contact information for after-discharge care       Destination       [facility] .    Service: Skilled Nursing  Contact information:  [address] [facility]   [place] [address] [phone]                                   Time Spent on Discharge:  Greater than 30 minutes      Mathew Castro MD  [facility]  10/28/23  11:21 EDT                Electronically signed by Mathew Castro MD at 10/28/23 1120

## 2023-11-17 ENCOUNTER — TRANSCRIBE ORDERS (OUTPATIENT)
Dept: HOME HEALTH SERVICES | Facility: HOME HEALTHCARE | Age: 58
End: 2023-11-17
Payer: COMMERCIAL

## 2023-11-17 ENCOUNTER — HOME HEALTH ADMISSION (OUTPATIENT)
Dept: HOME HEALTH SERVICES | Facility: HOME HEALTHCARE | Age: 58
End: 2023-11-17
Payer: COMMERCIAL

## 2023-11-17 DIAGNOSIS — S72.92XA CLOSED FRACTURE OF LEFT FEMUR, UNSPECIFIED FRACTURE MORPHOLOGY, UNSPECIFIED PORTION OF FEMUR, INITIAL ENCOUNTER: Primary | ICD-10-CM

## 2023-11-18 ENCOUNTER — HOME CARE VISIT (OUTPATIENT)
Dept: HOME HEALTH SERVICES | Facility: HOME HEALTHCARE | Age: 58
End: 2023-11-18

## 2023-11-20 ENCOUNTER — HOME CARE VISIT (OUTPATIENT)
Dept: HOME HEALTH SERVICES | Facility: HOME HEALTHCARE | Age: 58
End: 2023-11-20
Payer: COMMERCIAL

## 2023-11-20 VITALS
OXYGEN SATURATION: 99 % | HEART RATE: 83 BPM | SYSTOLIC BLOOD PRESSURE: 151 MMHG | RESPIRATION RATE: 18 BRPM | DIASTOLIC BLOOD PRESSURE: 73 MMHG | TEMPERATURE: 97.1 F

## 2023-11-20 PROCEDURE — G0151 HHCP-SERV OF PT,EA 15 MIN: HCPCS

## 2023-11-21 ENCOUNTER — HOME CARE VISIT (OUTPATIENT)
Dept: HOME HEALTH SERVICES | Facility: HOME HEALTHCARE | Age: 58
End: 2023-11-21
Payer: COMMERCIAL

## 2023-11-21 PROCEDURE — G0155 HHCP-SVS OF CSW,EA 15 MIN: HCPCS

## 2023-11-21 NOTE — HOME HEALTH
"Reason for referral/Focus of Care:  59 yo F referred to home health PT with decreased ability to transfer and amb related to recent fall down stairs at her new condo sustaining L femur fracture s/p IM nailing of distal femur    Subjective: \"I can't get to a lot of my things yet.\" per patient    Patient's PT Goal: \"get upstairs\" per patient    Pertinent Medical History: h/o B TKR, HTN, type 2 diabetes mellitus, HTN    Previous level of function: IND with amb with cane, IND with ADL's, driving    Social Environment/DME/Potential Barriers for Goal Attainment: lives alone with intermittent help provided by family and friends, full bathroom and bedroom upstairs. Staying on main floor currently with half bath and sleeping on sofa. Numerous boxes on floor due to patient recently moved into [facility]. Reports family came in this weekend and made pathways through home so she can get WC and walker around main floor. Has FWW, manual WC, BSC, cane    Skin Integrity/wound status: see wound care screen for details.    Code Status: Full    Medication issues/concerns: none identified    HB status: yes. See homebound screen for details.    Problems Identified: see Care Plan    Functional Status/Fall Risk/Safety: see PT evaluation/care plan    POC notification to Dr. Torres    on 11/20/23     via case communication.    Assessment: Skilled physical therapy is needed for 1w1, 2w2 due to decreased ability to transfer and amb related to recent L comminuted fracture related to fall s/p IM nailing distal femur currently NWB for evaluation, gait training, ther ex, HEP, transfer training, and pain/edema management.    MSW for community resources.  OT for bathroom safety and ADL training.    Plan for next visit:gait training, ther ex, HEP, fall prevention"

## 2023-11-22 ENCOUNTER — HOME CARE VISIT (OUTPATIENT)
Dept: HOME HEALTH SERVICES | Facility: HOME HEALTHCARE | Age: 58
End: 2023-11-22
Payer: COMMERCIAL

## 2023-11-22 VITALS
HEART RATE: 87 BPM | SYSTOLIC BLOOD PRESSURE: 136 MMHG | TEMPERATURE: 97.7 F | DIASTOLIC BLOOD PRESSURE: 84 MMHG | OXYGEN SATURATION: 98 %

## 2023-11-22 PROCEDURE — G0152 HHCP-SERV OF OT,EA 15 MIN: HCPCS

## 2023-11-22 NOTE — HOME HEALTH
MSW eval on this date with patient. Patient currently off from work and unable to return due to physicality of current position. Patient plans to apply for Disability and is aware of social security process. Patient denies having any current outstanding bills or needs. Provided information on [facility] that assists in current zip code. Provided patient info on  society as well in order to obtain assistance with employer related issues and disability. Patient agreeable to contact MSW with additional assistance as needed. Contact with Clinical supervisor regardnig upcoming OT eval.

## 2023-11-22 NOTE — HOME HEALTH
Upon OT evaluation, pt is MI with UBD, LBD, self feeding, functional mobility (WC level), toileting and G&H. Pt is also MI with IADLs such as light meal prep and some housekeeping. Pts bathroom is upstairs so pt is having to currently sponge bathe downstairs. Pt reports she does not have the income for a chair lift nor any friend/family she could visit to use their shower. Pt does own shower chair and grab bars to install once pt can hopefully progress with PT enough to make it upstairs. Patient said if she can't get up the stairs she will look into a portable tub.  Plan for next visit: N/A  MEDICAL NECESSITY FOR ONGOING SKILLED THERAPY: Patient requires skilled occupational therapy for remediation of deficits to improve activities of daily living, home safety, falls prevention, monitor vital signs, including pulse oximetry, hand/ upper extremity function/range of motion/strength, therapeutic exercise, safety in home, and improve endurance and fatigue management with self care, and functional mobility with durable medical equipment as necessary

## 2023-11-22 NOTE — Clinical Note
Dear Dr. Torres,     OT rajwinder performed this date, 11/22/23, with no skilled OT services indicated at this time. Pt does have a barrier to bathing as her shower is upstairs, however pt is unable to go upstairs with I at this time. Pt has adequate and safe means for sponge bathing on main floor at this time. Pt has all recommended AE in place to safely bathe once she is able to climb the stairs. Pt edu on importance of decluttering home and maintaining clear walkways.    Thank you,  Kristin Huerta OTR/L

## 2023-11-27 ENCOUNTER — HOME CARE VISIT (OUTPATIENT)
Dept: HOME HEALTH SERVICES | Facility: HOME HEALTHCARE | Age: 58
End: 2023-11-27
Payer: COMMERCIAL

## 2023-11-27 PROCEDURE — G0157 HHC PT ASSISTANT EA 15: HCPCS

## 2023-11-29 VITALS
OXYGEN SATURATION: 98 % | HEART RATE: 74 BPM | DIASTOLIC BLOOD PRESSURE: 78 MMHG | RESPIRATION RATE: 18 BRPM | TEMPERATURE: 97.1 F | SYSTOLIC BLOOD PRESSURE: 124 MMHG

## 2023-11-29 NOTE — HOME HEALTH
Patient has obtained a shower chair. No new concerns at this time. Pain levels well managed.    FALLS SINCE LAST VISIT: No    MEDICATION CHANGES: No    PLAN FOR NEXT VISIT:  Stair management  Transfer training  Review fall risk and safety management strategies  Review/progress home exercise program

## 2023-12-01 ENCOUNTER — HOME CARE VISIT (OUTPATIENT)
Dept: HOME HEALTH SERVICES | Facility: HOME HEALTHCARE | Age: 58
End: 2023-12-01
Payer: COMMERCIAL

## 2023-12-01 PROCEDURE — G0157 HHC PT ASSISTANT EA 15: HCPCS

## 2023-12-02 VITALS
RESPIRATION RATE: 18 BRPM | SYSTOLIC BLOOD PRESSURE: 133 MMHG | DIASTOLIC BLOOD PRESSURE: 86 MMHG | TEMPERATURE: 97.9 F | OXYGEN SATURATION: 99 % | HEART RATE: 73 BPM

## 2023-12-02 NOTE — HOME HEALTH
"Patient reports follow up appointment with surgeon went well. She states she is now \" limited weightbearing\". She cane not find the order that was sent home with her but she states that the Dr knew that she would be ascending and descending her stairs. She is no longer in a brace and she reports that she does not need to wear it at this time. F/u is in 3-4 weeks. Therapist to contact Md office for partial WBing orders for clarification.     FALLS SINCE LAST VISIT: No    MEDICATION CHANGES: No    PLAN FOR NEXT VISIT:  Transfer training  Stair management  Review fall risk and safety management strategies  Review/progress home exercise program"

## 2023-12-05 ENCOUNTER — HOME CARE VISIT (OUTPATIENT)
Dept: HOME HEALTH SERVICES | Facility: HOME HEALTHCARE | Age: 58
End: 2023-12-05
Payer: COMMERCIAL

## 2023-12-05 PROCEDURE — G0157 HHC PT ASSISTANT EA 15: HCPCS

## 2023-12-06 ENCOUNTER — HOME CARE VISIT (OUTPATIENT)
Dept: HOME HEALTH SERVICES | Facility: HOME HEALTHCARE | Age: 58
End: 2023-12-06
Payer: COMMERCIAL

## 2023-12-06 VITALS
TEMPERATURE: 97.3 F | DIASTOLIC BLOOD PRESSURE: 80 MMHG | SYSTOLIC BLOOD PRESSURE: 136 MMHG | HEART RATE: 84 BPM | OXYGEN SATURATION: 97 % | RESPIRATION RATE: 18 BRPM

## 2023-12-06 VITALS
RESPIRATION RATE: 18 BRPM | SYSTOLIC BLOOD PRESSURE: 135 MMHG | TEMPERATURE: 97.2 F | OXYGEN SATURATION: 98 % | HEART RATE: 84 BPM | DIASTOLIC BLOOD PRESSURE: 85 MMHG

## 2023-12-06 PROCEDURE — G0151 HHCP-SERV OF PT,EA 15 MIN: HCPCS

## 2023-12-06 NOTE — HOME HEALTH
"Patient reports that weekend went \"ok\". Pain levels have increased since her f/u appointment. She feels it is due to the discontinued use of her brace and increased occasional weightbearing. Therapist has spoken with Dr Torres Office and patient was informed that orders state that she is to be TTWB and she is educated on proper adherence. Verbal understanding given. She has a f/u is in 3 weeks. She is safe in the home and safe exiting the home. She gives good teachback of her HEP and DC notice was signed.     FALLS SINCE LAST VISIT: No    MEDICATION CHANGES: No    PLAN FOR NEXT VISIT:  Review HEP  Objective measures  Review fall risk and safety management strategies  Review/progress home exercise program"

## 2023-12-06 NOTE — Clinical Note
Patient has been discharged from Home Health due to goals achieved and max potential achieved with current restrictions. She may benefit from outpatient PT when wt bearing status is upgraded and she is able to do more. Thanks for the referral!

## 2023-12-06 NOTE — CASE COMMUNICATION
After speaking with Dr Torres office it was confirmed that patient is now TTWBing, ROM as tolerated and he has discontinued the knee brace. The next f/u appointment is in 3 weeks.

## 2023-12-07 NOTE — HOME HEALTH
"\"I'm doing all that I can do given my limitations.\" Patient had a recent follow up with Dr. Torres and she was given the ok for TTWB left LE. Initially, she did not understand the TTWB restrictions and standing and putting more weight on her leg than advised. She developed increased pain and edema, which has since improved. Catrachita Landry PTA confirmed TTWB status with Dr. Torres office and instructed patient yesterday. She is negotiated steps to second floor bedroom occasionally but scoots up and down on her butt. She has a transport wheelchair on the second floor that she uses. Patient is using regularly wheelchair on the first floor but is able to ambulate short distances with walker.     Patient has met with MSW to discussed community resources because she has no local family and only one friend.     No new falls or medication changes but patient noted she is taking 81 mg ASA daily now instead of BID. Updated on med list.    Plan: Discharge with independent HEP due to goals met and unable to progress due to restrictions at this time. Pt is in agreement. She may benefit from outpatient PT when current restrictions lifted if she is no longer homebound.     Follow up with Dr. Torres on 12/19/23"

## 2023-12-18 RX ORDER — ASPIRIN 81 MG/1
81 TABLET ORAL DAILY
Qty: 28 TABLET | Refills: 0 | Status: SHIPPED | OUTPATIENT
Start: 2023-12-18

## 2024-05-15 NOTE — PROGRESS NOTES
"  Physical Therapy Initial Evaluation and Plan of Care    [facility] Physical Therapy [facility]  [address] [place] [address]  [phone] (phone)  [phone] (fax)      Patient: Sasha Knapp   : 1965  Diagnosis/ICD-10 Code:  Impaired functional mobility, balance, gait, and endurance [Z74.09]  Referring practitioner: Thong Torres MD  Date of Initial Visit: 2024  Today's Date: 2024  Patient seen for 1 sessions    Visit Diagnoses:    ICD-10-CM ICD-9-CM   1. Impaired functional mobility, balance, gait, and endurance  Z74.09 V49.89   2. Pain in both lower extremities  M79.604 729.5    M79.605    3. Decreased range of motion of both knees  M25.661 719.56    M25.662    4. Weakness of both lower extremities  R29.898 729.89              Subjective Evaluation    History of Present Illness  Date of surgery: 10/24/2023  Mechanism of injury: Pt presents to physical therapy with c/o BLE weakness and pain (L>R), which began after she fell down the stairs in 2023 (10/23/2024). Pt states that she broke \"all the bones attached to my L knee replacement including femur\". She had surgery on 10/24/2023 and pins placed to stabilizer her knee and a kalina to fix her femur. Pt had rehab PT and  PT but she was NWB \"for months\"--could start weightbearing mid December. She had a follow up with her surgeon 2 weeks ago and she no longer has to wear her bone growth stimulator. Pt also has swelling B--wears compression socks. Pt reports she has to plan all of her movements/excursions from her house, which is frustrating and mentally exhausting.   Pt states she had weakness prior to her TKAs which led to her fall. The pain is located lateral side of her L knee and straight down the middle of her L knee. She also has pain on the lateral side of her R knee. The pain is aggravated with/she has difficulty with ambulation (is able to walk to her mailbox--<5 min), standing, carrying " "something when she walks, STS transfers, bed mobility--has to physically move her legs sometimes. Pt has 2 handrails on either side of her staircase and she primarily uses her UB strength to go up/ She states it takes her a long time to get going/warmed up in the AM. It is eased by \"rub-on cramp medication\" or prescription anti-inflammatory cream, ice, and occasionally Tylenol. She also c/o of a lot of waldo horses in her calves. She reports she has tingling in her feet B.     PLOF: Pt primarily uses a wheelchair for mobility at home. When she does walk, she walks with a 4PC or a rollator.     PMH: 2 TKAs L knee, TKA R knee, DM, HTN, anemia       Patient Occupation: currently not working--used to be [employer]  Quality of life: fair    Pain  Current pain rating: 3  At best pain ratin  At worst pain rating: 10  Quality: sharp, dull ache and knife-like (dull ache on the outside of her leg; hot poker through her knee)  Relieving factors: ice  Aggravating factors: ambulation, stairs and standing  Progression: no change    Social Support  Lives in: apartment (1 flight of stairs. Pt has no family in [place]--closest brother is in [place])  Lives with: alone    Treatments  Previous treatment: physical therapy, medication, immobilization and home therapy  Current treatment: medication and physical therapy  Patient Goals  Patient goals for therapy: decreased pain, improved balance, increased strength, increased motion, independence with ADLs/IADLs and return to sport/leisure activities             Objective          Active Range of Motion   Left Knee   Flexion: 91 degrees     Right Knee   Flexion: 106 degrees     Passive Range of Motion   Left Knee   Extension: 0 degrees     Right Knee   Extension: 0 degrees     Strength/Myotome Testing     Left Hip   Planes of Motion   Flexion: 3    Right Hip   Planes of Motion   Flexion: 4-    Left Knee   Flexion: 4+  Extension: 3+    Right Knee   Flexion: " 4+  Extension: 3+    Left Ankle/Foot   Dorsiflexion: 3    Right Ankle/Foot   Dorsiflexion: 4+    Ambulation     Comments   W/ rollator: Pt demonstrates FFP, increased lateral trunk L to L side, genu varus L side    Functional Assessment     Comments  Pt is able to stand using her hands on her thighs--she states she normally pushes up with her arms on her arm rest    Pt able to stand for 10-15 sec without UE support        See Exercise, Manual, and Modality Logs for complete treatment.       Functional Outcome Score: LEFS: 36% functional ability (64% disability)     Reviewed with pt the importance of arriving early for aquatics appointments and to be dressed and ready by appt time. Instructed pt to bring her wheelchair to get in and out of aquatics appts in order to conserve energy for the therapy session. Also reviewed importance of shoe wear while on pool deck.      Finally, instructed pt to try to challenge herself to push off her thighs for STS transfers instead of armrests.    Assessment & Plan       Assessment  Impairments: abnormal or restricted ROM, activity intolerance, impaired physical strength, lacks appropriate home exercise program and pain with function   Functional limitations: carrying objects, lifting, sleeping, uncomfortable because of pain and standing   Assessment details: Pt is a 59 y.o. female who presents to physical therapy with c/o BLE weakness, functional weakness, and pain in BLEs (L>R).  she presents with a stable clinical presentation. She has comorbidities of DM, HTN, and hx of B TKAs and personal factors of living alone and having no family in [place] that may affect her progress in the plan of care. Signs and symptoms are consistent with physical therapy diagnosis of functional weakness. Pt also demonstrates decreased activity tolerance, balance deficits, gait deviations, B knee ROM limitations, and BLE weakness.  Prognosis: good    Goals  Plan Goals: STGs to be completed in 30  days  1. Pt will be educated on and independent with strategies for sx management/pain reduction.  2. Pt will demonstrate improved gross strength to at least a 4/5 in order to facilitate ease with STS transfers.  3. Pt will be able to walk with a rollator for >5 min in order to facilitate ease with community mobility.  4. Pt will complete the 5xSTS test in order for PT to assess    LTGs to be completed in 90 days  1. Pt will improve score on LEFS by 20% function (56% functional ability) in order to demonstrate ease with ADLs including household chores.  2. Pt will be independently follow an established aquatic routine for lumbopelvic strengthening and ROM requiring no cues for form.   3. Pt will be able to go up and down her stairs with reciprocal or nonreciprocal pattern and LIGHT UE support--no more than min to mod.  4. Pt will complete the 5xSTS test in 18 seconds or less with UE support in order to demonstrate improved functional strength as well as a decreased risk of falls.     Plan  Therapy options: will be seen for skilled therapy services  Planned modality interventions: cryotherapy, TENS, thermotherapy (hydrocollator packs), traction, hydrotherapy and ultrasound  Planned therapy interventions: abdominal trunk stabilization, neuromuscular re-education, postural training, flexibility, functional ROM exercises, strengthening, home exercise program and therapeutic activities  Other planned therapy interventions: Aquatics  Frequency: 2x week  Duration in weeks: 12  Treatment plan discussed with: patient        Timed:         Manual Therapy:    0     mins  30575;     Therapeutic Exercise:    0     mins  22931;     Neuromuscular Peggy:    0    mins  37302;    Therapeutic Activity:     3     mins  74777;     Gait Trainin     mins  87498;     Ultrasound:     0     mins  29803;    Self Care                       0     mins   22080      Un-Timed:  Electrical Stimulation:    0     mins  56184 ( );  Dry  Needling  (1-2 muscles)            0     mins 20560 (Self-pay)  Dry Needling (3-4 muscles) 0     mins 20561 (Self-pay)  Dry Needling Trial    0     mins DRYNDLTRIAL  (No Charge)  Traction     0     mins 21258  Low Eval     0     Mins  13426  Mod Eval     37     Mins  45594  High Eval                       0     Mins  28903    Timed Treatment:   3   mins   Total Treatment:     40   mins    PT SIGNATURE: HENRY Flood License Number: [id number]    Electronically signed by Aspen Jackson PT, 05/15/24, 9:23 AM EDT    DATE TREATMENT INITIATED: 5/16/2024    Initial Certification  Certification Period: 8/14/2024  I certify that the therapy services are furnished while this patient is under my care.  The services outlined above are required by this patient, and will be reviewed every 90 days.     PHYSICIAN: Thong Torres MD   NPI: [id number]                                         DATE:     Please sign and return via fax to [phone] Thank you, [facility] Physical Therapy.

## 2024-05-16 ENCOUNTER — TREATMENT (OUTPATIENT)
Dept: PHYSICAL THERAPY | Facility: CLINIC | Age: 59
End: 2024-05-16
Payer: COMMERCIAL

## 2024-05-16 DIAGNOSIS — Z74.09 IMPAIRED FUNCTIONAL MOBILITY, BALANCE, GAIT, AND ENDURANCE: Primary | ICD-10-CM

## 2024-05-16 DIAGNOSIS — R29.898 WEAKNESS OF BOTH LOWER EXTREMITIES: ICD-10-CM

## 2024-05-16 DIAGNOSIS — M25.662 DECREASED RANGE OF MOTION OF BOTH KNEES: ICD-10-CM

## 2024-05-16 DIAGNOSIS — M79.605 PAIN IN BOTH LOWER EXTREMITIES: ICD-10-CM

## 2024-05-16 DIAGNOSIS — M79.604 PAIN IN BOTH LOWER EXTREMITIES: ICD-10-CM

## 2024-05-16 DIAGNOSIS — M25.661 DECREASED RANGE OF MOTION OF BOTH KNEES: ICD-10-CM

## 2024-05-19 NOTE — PROGRESS NOTES
"Physical Therapy Daily Treatment Note    [facility] Physical Therapy [facility]  [address] [place] [address]  [phone] (phone)  [phone] (fax)    Patient: Sasha Knapp   : 1965  Diagnosis/ICD-10 Code:  Impaired functional mobility, balance, gait, and endurance [Z74.09]  Referring practitioner: Thong Torres MD  Date of Initial Visit: Type: THERAPY  Noted: 2024  Today's Date: 2024  Patient seen for 2 sessions    Visit Diagnoses:    ICD-10-CM ICD-9-CM   1. Impaired functional mobility, balance, gait, and endurance  Z74.09 V49.89   2. Pain in both lower extremities  M79.604 729.5    M79.605    3. Decreased range of motion of both knees  M25.661 719.56    M25.662    4. Weakness of both lower extremities  R29.898 729.89              Subjective   Pt reports that everything feels very stiff and sore today. She states that every morning when she wakes up, it feels like someone hits her knees with a crow bar. She states usually it goes away after a while but it's hanging around today.    Objective   See Exercise, Manual, and Modality Logs for complete treatment.     Pt amb w/ CGA from w/c to pool deck w/ use of SC. Descended stairs laterally w/ CGA. Ascended stairs laterally leading with RLE.    AQUATIC EXERCISES    Water Walk :    forward and sidways 15 ft x2 along rail  March Walk:      Tandem Walk:               Stretch 1:        Knee flexion stretch, foot on bench, 10 sec hold x10 LLE, 10 sec hold x5 RLE  Stretch 2:         Quad stretch 30 sec x2 B  Stretch 3:           Stretch 4:           Abdominals:       Hip Abd/Add:     Hip Ext:             SLR:          Hip Circles:      -  March in Place (Alternating):   x20  Leg Press             Mini Squat :       x10   Toe/Heel Raises:        -  Uni-Clock:       -  Hip Circles        Step Ups  8\" Height:     -  Shoulder Rows (Alternating):    Paddle stirs:    Shoulder Horizontal Ab/Adduction:       Shoulder " "Ab/Adduction:            UE \"L\" presses            -  Seated at bench  Bicycle:    x2 min  Flutter/Scissor:     20/20        Clams:               STS     Assessment/Plan  Today was pt's first treatment session following initial eval. Instructed pt in proper form for all exercises. Plan to focus on functional strength and balance and L knee ROM. Continue PT POC.          Timed:  Aquatic Therapy    28     mins 93960;    Aspen Jackson, PT  Physical Therapist    [place] License: [id number]  "

## 2024-05-21 ENCOUNTER — TREATMENT (OUTPATIENT)
Dept: PHYSICAL THERAPY | Facility: CLINIC | Age: 59
End: 2024-05-21
Payer: COMMERCIAL

## 2024-05-21 DIAGNOSIS — M79.604 PAIN IN BOTH LOWER EXTREMITIES: ICD-10-CM

## 2024-05-21 DIAGNOSIS — M79.605 PAIN IN BOTH LOWER EXTREMITIES: ICD-10-CM

## 2024-05-21 DIAGNOSIS — Z74.09 IMPAIRED FUNCTIONAL MOBILITY, BALANCE, GAIT, AND ENDURANCE: Primary | ICD-10-CM

## 2024-05-21 DIAGNOSIS — R29.898 WEAKNESS OF BOTH LOWER EXTREMITIES: ICD-10-CM

## 2024-05-21 DIAGNOSIS — M25.662 DECREASED RANGE OF MOTION OF BOTH KNEES: ICD-10-CM

## 2024-05-21 DIAGNOSIS — M25.661 DECREASED RANGE OF MOTION OF BOTH KNEES: ICD-10-CM

## 2024-05-21 PROCEDURE — 97113 AQUATIC THERAPY/EXERCISES: CPT

## 2024-05-23 ENCOUNTER — TREATMENT (OUTPATIENT)
Dept: PHYSICAL THERAPY | Facility: CLINIC | Age: 59
End: 2024-05-23
Payer: COMMERCIAL

## 2024-05-23 DIAGNOSIS — M79.604 PAIN IN BOTH LOWER EXTREMITIES: ICD-10-CM

## 2024-05-23 DIAGNOSIS — M25.661 DECREASED RANGE OF MOTION OF BOTH KNEES: ICD-10-CM

## 2024-05-23 DIAGNOSIS — Z74.09 IMPAIRED FUNCTIONAL MOBILITY, BALANCE, GAIT, AND ENDURANCE: Primary | ICD-10-CM

## 2024-05-23 DIAGNOSIS — R29.898 WEAKNESS OF BOTH LOWER EXTREMITIES: ICD-10-CM

## 2024-05-23 DIAGNOSIS — M79.605 PAIN IN BOTH LOWER EXTREMITIES: ICD-10-CM

## 2024-05-23 DIAGNOSIS — M25.662 DECREASED RANGE OF MOTION OF BOTH KNEES: ICD-10-CM

## 2024-05-23 NOTE — PROGRESS NOTES
"Physical Therapy Daily Treatment Note    [facility] Physical Therapy [facility]  [address] [place] [address]  [phone] (phone)  [phone] (fax)    Patient: Sasha Knapp   : 1965  Diagnosis/ICD-10 Code:  Impaired functional mobility, balance, gait, and endurance [Z74.09]  Referring practitioner: Thong Torres MD  Date of Initial Visit: Type: THERAPY  Noted: 2024  Today's Date: 2024  Patient seen for 3 sessions    Visit Diagnoses:    ICD-10-CM ICD-9-CM   1. Impaired functional mobility, balance, gait, and endurance  Z74.09 V49.89   2. Pain in both lower extremities  M79.604 729.5    M79.605    3. Decreased range of motion of both knees  M25.661 719.56    M25.662    4. Weakness of both lower extremities  R29.898 729.89              Subjective   Pt reports that she was pretty tired after last session. She also states that she went to Dr. Torres this morning who gave her a new medication to try to address the pain she is having in the AM.     Objective   See Exercise, Manual, and Modality Logs for complete treatment.     AQUATIC EXERCISES     Water Walk :    forward and sidways 15 ft x2 along rail  March Walk:      Tandem Walk:               Stretch 1:        Knee flexion stretch, foot on bench, 10 sec hold x10 LLE, 10 sec hold x5 RLE  Stretch 2:         Quad stretch 30 sec x2 B  Stretch 3:           Stretch 4:           Abdominals:       Hip Abd/Add:   x10  Hip Ext:             SLR:          Hip Circles:      -  March in Place (Alternating):   x20  Leg Press             Mini Squat :       x10   Toe/Heel Raises:        -  Uni-Clock:       -  Hip Circles        Step Ups  8\" Height:     -  Shoulder Rows (Alternating):    Paddle stirs:    Shoulder Horizontal Ab/Adduction:       Shoulder Ab/Adduction:            UE \"L\" presses            -  Seated at bench  Bicycle:    x2 min  Flutter/Scissor:     20/20        Clams:                          STS "     Assessment/Plan  Pt tolerated session well overall, reporting an appropriate level of muscle fatigue following strength exercises. Added hip abduction this session to strengthen glute med/min. Cued pt to hold knee flexion stretch and quad stretch for a longer period of time. Continue PT POC.          Timed:  Aquatic Therapy    27     mins 67724;    Aspen Jackson, PT  Physical Therapist    [place] License: [id number]

## 2024-05-29 NOTE — PROGRESS NOTES
"Physical Therapy Daily Treatment Note    [facility] Physical Therapy [facility]  [address] [place] [address]  [phone] (phone)  [phone] (fax)    Patient: Sasha Knapp   : 1965  Diagnosis/ICD-10 Code:  Impaired functional mobility, balance, gait, and endurance [Z74.09]  Referring practitioner: Thong Torres MD  Date of Initial Visit: Type: THERAPY  Noted: 2024  Today's Date: 2024  Patient seen for 4 sessions    Visit Diagnoses:    ICD-10-CM ICD-9-CM   1. Impaired functional mobility, balance, gait, and endurance  Z74.09 V49.89   2. Pain in both lower extremities  M79.604 729.5    M79.605    3. Decreased range of motion of both knees  M25.661 719.56    M25.662    4. Weakness of both lower extremities  R29.898 729.89              Subjective   Pt states she was able to go to her [employer] this past weekend, but it wore her out. She had someone help push her back out to the car. She also bruised her leg picking up her w/c to put it in the car.    Objective   See Exercise, Manual, and Modality Logs for complete treatment.     AQUATIC EXERCISES     Water Walk :    forward and sidways 15 ft x2 along rail  March Walk:      Tandem Walk:               Stretch 1:        Knee flexion stretch, foot on bench, 10 sec hold x10 LLE, 10 sec hold x5 RLE *defer  Stretch 2:         Quad stretch 30 sec B  Stretch 3:           Stretch 4:           Abdominals:       Hip Abd/Add:   x10 B  Hip Ext:             SLR:        x10 B  Hip Circles:      -  March in Place (Alternating):   x20  Leg Press             Mini Squat :       x10   Toe/Heel Raises:        x10  Uni-Clock:       -  Hip Circles        Step Ups  8\" Height:     -  Shoulder Rows (Alternating):    Paddle stirs:    Shoulder Horizontal Ab/Adduction:       Shoulder Ab/Adduction:            UE \"L\" presses            -  Seated at bench  Bicycle:    x2 min *defer  Flutter/Scissor:     20/20      *defer  Clams:        "                   STS     Entered and exited pool via stairs, nonreciprocal, w/ CGA from PT    Assessment/Plan  Pt tolerated session well overall, reporting an appropriate level of muscle fatigue following strength exercises. Added heel raises this session to strengthen gastroc/soleus complex. Cued pt to decrease hold time for SLRs and for slow ecc phase during heel raises. Continue PT POC.          Timed:  Aquatic Therapy    20     mins 85513;    Aspen Jackson, PT  Physical Therapist    [place] License: [id number]

## 2024-05-30 ENCOUNTER — TREATMENT (OUTPATIENT)
Dept: PHYSICAL THERAPY | Facility: CLINIC | Age: 59
End: 2024-05-30
Payer: COMMERCIAL

## 2024-05-30 DIAGNOSIS — M79.604 PAIN IN BOTH LOWER EXTREMITIES: ICD-10-CM

## 2024-05-30 DIAGNOSIS — M25.662 DECREASED RANGE OF MOTION OF BOTH KNEES: ICD-10-CM

## 2024-05-30 DIAGNOSIS — R29.898 WEAKNESS OF BOTH LOWER EXTREMITIES: ICD-10-CM

## 2024-05-30 DIAGNOSIS — M79.605 PAIN IN BOTH LOWER EXTREMITIES: ICD-10-CM

## 2024-05-30 DIAGNOSIS — Z74.09 IMPAIRED FUNCTIONAL MOBILITY, BALANCE, GAIT, AND ENDURANCE: Primary | ICD-10-CM

## 2024-05-30 DIAGNOSIS — M25.661 DECREASED RANGE OF MOTION OF BOTH KNEES: ICD-10-CM

## 2024-06-02 NOTE — PROGRESS NOTES
"Physical Therapy Daily Treatment Note    [facility] Physical Therapy [facility]  [address] [place] [address]  [phone] (phone)  [phone] (fax)    Patient: Sasha Knapp   : 1965  Diagnosis/ICD-10 Code:  Impaired functional mobility, balance, gait, and endurance [Z74.09]  Referring practitioner: Thong Torres MD  Date of Initial Visit: Type: THERAPY  Noted: 2024  Today's Date: 2024  Patient seen for 5 sessions    Visit Diagnoses:    ICD-10-CM ICD-9-CM   1. Impaired functional mobility, balance, gait, and endurance  Z74.09 V49.89   2. Pain in both lower extremities  M79.604 729.5    M79.605    3. Decreased range of motion of both knees  M25.661 719.56    M25.662    4. Weakness of both lower extremities  R29.898 729.89              Subjective     Objective   See Exercise, Manual, and Modality Logs for complete treatment.     AQUATIC EXERCISES     Water Walk :    forward and sidways 15 ft x2 along rail  March Walk:      Tandem Walk:               Stretch 1:        Knee flexion stretch, foot on bench, 10 sec hold x10 LLE, 10 sec hold x5 RLE *defer  Stretch 2:         Quad stretch 30 sec B  Stretch 3:           Stretch 4:           Abdominals:       Hip Abd/Add:   x15 B  Hip Ext:             SLR:        2x10 B  Hip Circles:      -  March in Place (Alternating):   x20  Leg Press: x10 med ring       Mini Squat :       x15  Toe/Heel Raises:        x10  Uni-Clock:       -  Hip Circles        Step Ups  8\" Height:     -  Shoulder Rows (Alternating):    Paddle stirs:    Shoulder Horizontal Ab/Adduction:       Shoulder Ab/Adduction:            UE \"L\" presses            -  Seated at bench *defer  Bicycle:    x2 min *defer  Flutter/Scissor:     20/20      *defer  Clams:                          STS      Entered and exited pool via stairs, nonreciprocal, w/ CGA from PT    Assessment/Plan  Pt tolerated session well overall, reporting an appropriate level of muscle fatigue " following strength exercises. Added leg press this session to further strengthen LE musculature, including hamstrings and quads. Required min vcs to not go beyond 90 degrees of hip flexion, which she was able to correct. Also increased reps for squats, hip ABD, and hip flex. Continue PT POC.          Timed:  Aquatic Therapy    28     mins 06581;    Aspen Jackson, HENRY  Physical Therapist    [place] License: [id number]

## 2024-06-04 ENCOUNTER — TREATMENT (OUTPATIENT)
Dept: PHYSICAL THERAPY | Facility: CLINIC | Age: 59
End: 2024-06-04
Payer: COMMERCIAL

## 2024-06-04 DIAGNOSIS — M79.605 PAIN IN BOTH LOWER EXTREMITIES: ICD-10-CM

## 2024-06-04 DIAGNOSIS — M79.604 PAIN IN BOTH LOWER EXTREMITIES: ICD-10-CM

## 2024-06-04 DIAGNOSIS — R29.898 WEAKNESS OF BOTH LOWER EXTREMITIES: ICD-10-CM

## 2024-06-04 DIAGNOSIS — M25.661 DECREASED RANGE OF MOTION OF BOTH KNEES: ICD-10-CM

## 2024-06-04 DIAGNOSIS — M25.662 DECREASED RANGE OF MOTION OF BOTH KNEES: ICD-10-CM

## 2024-06-04 DIAGNOSIS — Z74.09 IMPAIRED FUNCTIONAL MOBILITY, BALANCE, GAIT, AND ENDURANCE: Primary | ICD-10-CM

## 2024-06-04 PROCEDURE — 97113 AQUATIC THERAPY/EXERCISES: CPT

## 2024-06-06 NOTE — PROGRESS NOTES
Physical Therapy Daily Treatment Note    [facility] Physical Therapy [facility]  [address] [place] [address]  [phone] (phone)  [phone] (fax)    Patient: Sasha Knapp   : 1965  Diagnosis/ICD-10 Code:  Impaired functional mobility, balance, gait, and endurance [Z74.09]  Referring practitioner: Thong Torres MD  Today's Date: 2024  Patient seen for 6 sessions    Visit Diagnoses:    ICD-10-CM ICD-9-CM   1. Impaired functional mobility, balance, gait, and endurance  Z74.09 V49.89   2. Pain in both lower extremities  M79.604 729.5    M79.605    3. Decreased range of motion of both knees  M25.661 719.56    M25.662    4. Weakness of both lower extremities  R29.898 729.89              Subjective   Pt states she is tired from getting in here today. She states she accidentally came in  too thinking it was Friday.     Objective   See Exercise, Manual, and Modality Logs for complete treatment.     THEREX/THERACT/NEURO  - NuStep, lvl 7, seat 7 x5 min  - STS--lowered plinth--4x5  - LAQs, x10 B  - Hooklying clamshells RTB 2x15  - HS stretch green strap 30 sec x2 B    Assessment/Plan  Today was pt's first treatment session on land following several aquatic appointments. Instructed pt in proper form for all exercises. Modified clamshells to perform with less knee flexion due to pt tolerance. Continue PT POC.          Timed:    Manual Therapy:    0     mins  35276;  Therapeutic Exercise:    25     mins  47129;     Neuromuscular Peggy:    18    mins  64029;    Therapeutic Activity:     0     mins  44853;     Gait Trainin     mins  81723;     Ultrasound:     0     mins  08245;    Aquatic Therapy    0     mins 88623;  Self Care                       0     mins   69294        Untimed:  Electrical Stimulation:    0     mins  01745 ( );  Traction:    0     mins  99884;   Dry Needling  (1-2 muscles)            0     mins 71102 (Self-pay)  Dry Needling (3-4  muscles) 0     05575 (Self-pay)  Dry Needling Trial    0     DRYNDLTRIAL  (No Charge)    Timed Treatment:   43   mins   Total Treatment:     43   mins    Aspen Jackson PT  Physical Therapist    [place] License:[id number]

## 2024-06-07 ENCOUNTER — TREATMENT (OUTPATIENT)
Dept: PHYSICAL THERAPY | Facility: CLINIC | Age: 59
End: 2024-06-07
Payer: COMMERCIAL

## 2024-06-07 DIAGNOSIS — Z74.09 IMPAIRED FUNCTIONAL MOBILITY, BALANCE, GAIT, AND ENDURANCE: Primary | ICD-10-CM

## 2024-06-07 DIAGNOSIS — R29.898 WEAKNESS OF BOTH LOWER EXTREMITIES: ICD-10-CM

## 2024-06-07 DIAGNOSIS — M79.604 PAIN IN BOTH LOWER EXTREMITIES: ICD-10-CM

## 2024-06-07 DIAGNOSIS — M25.662 DECREASED RANGE OF MOTION OF BOTH KNEES: ICD-10-CM

## 2024-06-07 DIAGNOSIS — M79.605 PAIN IN BOTH LOWER EXTREMITIES: ICD-10-CM

## 2024-06-07 DIAGNOSIS — M25.661 DECREASED RANGE OF MOTION OF BOTH KNEES: ICD-10-CM

## 2024-06-10 NOTE — PROGRESS NOTES
"Physical Therapy Daily Treatment Note    [facility] Physical Therapy [facility]  [address] [place] [address]  [phone] (phone)  [phone] (fax)    Patient: Sasha Knapp   : 1965  Diagnosis/ICD-10 Code:  Impaired functional mobility, balance, gait, and endurance [Z74.09]  Referring practitioner: Thong Torres MD  Date of Initial Visit: Type: THERAPY  Noted: 2024  Today's Date: 2024  Patient seen for 7 sessions    Visit Diagnoses:    ICD-10-CM ICD-9-CM   1. Impaired functional mobility, balance, gait, and endurance  Z74.09 V49.89   2. Pain in both lower extremities  M79.604 729.5    M79.605    3. Decreased range of motion of both knees  M25.661 719.56    M25.662    4. Weakness of both lower extremities  R29.898 729.89              Subjective   Pt states that she was pretty tired following her last land therapy session.     Objective   See Exercise, Manual, and Modality Logs for complete treatment.     AQUATIC EXERCISES     Water Walk :    forward and sidways 15 ft x2 along rail  March Walk:      Tandem Walk:               Stretch 1:        Knee flexion stretch, foot on bench, 10 sec hold x10 LLE, 10 sec hold x5 RLE *defer  Stretch 2:         Quad stretch 30 sec B *defer  Stretch 3:           Stretch 4:           Abdominals:       Hip Abd/Add:   x15 B  Hip Ext:             SLR:        2x10 B *defer  Hip Circles:      -  March in Place (Alternating):   x20 *defer  Leg Press: x15 med ring       Mini Squat :       x15  Toe/Heel Raises:        x10  Uni-Clock:       -  Hip Circles        Step Ups  8\" Height:     -  Shoulder Rows (Alternating):    Paddle stirs:    Shoulder Horizontal Ab/Adduction:       Shoulder Ab/Adduction:            UE \"L\" presses            -  Seated at bench *defer  Bicycle:    x2 min *defer  Flutter/Scissor:     20/20      *defer  Clams:                          STS      Entered and exited pool via stairs, nonreciprocal, w/ CGA from PT "     Assessment/Plan  Pt tolerated session well overall, reporting an appropriate level of muscle fatigue following strength exercises. Cued pt to avoid lateral trunk lean during hip abd and leg press to engage hip abductors and lateral trunk musculature. Continue PT POC.          Timed:  Aquatic Therapy    29     mins 23853;    Aspen Jackson, PT  Physical Therapist    [place] License: [id number]

## 2024-06-11 ENCOUNTER — TREATMENT (OUTPATIENT)
Dept: PHYSICAL THERAPY | Facility: CLINIC | Age: 59
End: 2024-06-11
Payer: COMMERCIAL

## 2024-06-11 DIAGNOSIS — R29.898 WEAKNESS OF BOTH LOWER EXTREMITIES: ICD-10-CM

## 2024-06-11 DIAGNOSIS — M79.604 PAIN IN BOTH LOWER EXTREMITIES: ICD-10-CM

## 2024-06-11 DIAGNOSIS — Z74.09 IMPAIRED FUNCTIONAL MOBILITY, BALANCE, GAIT, AND ENDURANCE: Primary | ICD-10-CM

## 2024-06-11 DIAGNOSIS — M25.661 DECREASED RANGE OF MOTION OF BOTH KNEES: ICD-10-CM

## 2024-06-11 DIAGNOSIS — M25.662 DECREASED RANGE OF MOTION OF BOTH KNEES: ICD-10-CM

## 2024-06-11 DIAGNOSIS — M79.605 PAIN IN BOTH LOWER EXTREMITIES: ICD-10-CM

## 2024-06-13 NOTE — PROGRESS NOTES
"Physical Therapy Daily Treatment Note    [facility]  [address] [place] [address]  [phone] (phone)  [phone] (fax)    Patient: Sasha Knapp   : 1965  Diagnosis/ICD-10 Code:  Impaired functional mobility, balance, gait, and endurance [Z74.09]  Referring practitioner: Thong Torres MD  Today's Date: 2024  Patient seen for 8 sessions    Visit Diagnoses:    ICD-10-CM ICD-9-CM   1. Impaired functional mobility, balance, gait, and endurance  Z74.09 V49.89   2. Pain in both lower extremities  M79.604 729.5    M79.605    3. Decreased range of motion of both knees  M25.661 719.56    M25.662    4. Weakness of both lower extremities  R29.898 729.89              Subjective   Pt reports that she was fatigued after her session in the water.     Objective   See Exercise, Manual, and Modality Logs for complete treatment.     THEREX/THERACT/NEURO  - NuStep, lvl 7, seat 7 x5 min  - STS--lowered plinth--4x5  - LAQs, x10 B w/ black weight sitting in rollator/\"purple rain\"  - Hooklying clamshells RTB 2x15  - HS stretch green strap 30 sec x2 B    Assessment/Plan  Pt tolerated session well overall, reporting an appropriate level of muscle fatigue following strength exercises. Pt was given a printout of HEP this session to include STS, LAQ, and clamshells. Increased resistance on LAQs by adding black ankle weight. Also discussed ways to make STS harder eventually, including \"tapping\" butt on surface instead of sitting, lowering the surface, or adding weight. Continue PT POC.        Timed:    Manual Therapy:    0     mins  56212;  Therapeutic Exercise:    25     mins  29612;     Neuromuscular Peggy:    0    mins  70638;    Therapeutic Activity:     18     mins  15430;     Gait Trainin     mins  99824;     Ultrasound:     0     mins  15955;    Aquatic Therapy    0     mins 38695;  Self Care                       0     mins   " [id number]        Untimed:  Electrical Stimulation:    0     mins  11592 ( );  Traction:    0     mins  80295;   Dry Needling  (1-2 muscles)            0     mins 20560 (Self-pay)  Dry Needling (3-4 muscles) 0     20561 (Self-pay)  Dry Needling Trial    0     DRYNDLTRIAL  (No Charge)    Timed Treatment:   43   mins   Total Treatment:     43   mins    Aspen Jackson PT  Physical Therapist    [place] License:[id number]

## 2024-06-14 ENCOUNTER — TREATMENT (OUTPATIENT)
Dept: PHYSICAL THERAPY | Facility: CLINIC | Age: 59
End: 2024-06-14
Payer: COMMERCIAL

## 2024-06-14 DIAGNOSIS — R29.898 WEAKNESS OF BOTH LOWER EXTREMITIES: ICD-10-CM

## 2024-06-14 DIAGNOSIS — M25.661 DECREASED RANGE OF MOTION OF BOTH KNEES: ICD-10-CM

## 2024-06-14 DIAGNOSIS — M25.662 DECREASED RANGE OF MOTION OF BOTH KNEES: ICD-10-CM

## 2024-06-14 DIAGNOSIS — M79.605 PAIN IN BOTH LOWER EXTREMITIES: ICD-10-CM

## 2024-06-14 DIAGNOSIS — Z74.09 IMPAIRED FUNCTIONAL MOBILITY, BALANCE, GAIT, AND ENDURANCE: Primary | ICD-10-CM

## 2024-06-14 DIAGNOSIS — M79.604 PAIN IN BOTH LOWER EXTREMITIES: ICD-10-CM

## 2024-07-01 ENCOUNTER — TELEPHONE (OUTPATIENT)
Dept: PHYSICAL THERAPY | Facility: OTHER | Age: 59
End: 2024-07-01
Payer: COMMERCIAL

## 2024-07-01 NOTE — TELEPHONE ENCOUNTER
Caller: Sasha Knapp    Relationship: Self    What was the call regarding: PATIENT CANCELLED ALL APPTS DUE TO HER NO LONGER HAVING INSURANCE

## 2024-07-17 ENCOUNTER — DOCUMENTATION (OUTPATIENT)
Dept: PHYSICAL THERAPY | Facility: CLINIC | Age: 59
End: 2024-07-17
Payer: COMMERCIAL

## 2024-07-17 DIAGNOSIS — Z74.09 IMPAIRED FUNCTIONAL MOBILITY, BALANCE, GAIT, AND ENDURANCE: Primary | ICD-10-CM

## 2025-02-27 NOTE — SIGNIFICANT NOTE
10/25/23 0844   OTHER   Discipline physical therapist   Rehab Time/Intention   Session Not Performed other (see comments)  (Noted plans for L IM nail vs ORIF today. PT will f/u tomorrow for post-op eval.)   Recommendation   PT - Next Appointment 10/26/23        Assessment & Plan  Type 2 diabetes mellitus without complication, without long-term current use of insulin (Multi)    Orders:    POCT glycosylated hemoglobin (Hb A1C) manually resulted    tirzepatide (Mounjaro) 2.5 mg/0.5 mL pen injector; Inject 2.5 mg under the skin 1 (one) time per week.    metFORMIN XR (Glucophage-XR) 500 mg 24 hr tablet; Take 2 tablets (1,000 mg) by mouth once daily in the evening. Take with meals AND 1 tablet (500 mg) once daily in the morning. Do not crush, chew, or split..    FreeStyle Darshan 3 Sensor device; Apply to back of arm once every 15 days for blood glucose management    Referral to Diabetes Education; Future   Diabetes Type 2  A1C is now 8.7 from 6.9   Continue current medication. Begin mounjaro once weekly,  Increase metformin 500mg in am 1000mg with dinner.   Continue work on diet - recommend lots of fruits and vegetables, lean protein like chicken, turkey, fish, beans and Greek yogurt. Try to choose healthier carbohydrate options like oatmeal, wheat bread and pasta, sweet potatoes. Limit sugary treats.  Check a fasting sugar first thing in the AM twice daily and keep a log of the results to bring to your next office visit.  Please contact office if your sugars are consistently >140.  Reevaluate in 3 months.   Benign essential hypertension    Orders:    hydroCHLOROthiazide (HYDRODiuril) 25 mg tablet; Take 1 tablet (25 mg) by mouth once daily.  HYPERTENSION:   Decrease intake of processed foods, fast foods, lunch meat, canned soups, canned veggies.  Increase intake of fresh fruits, veggies, and lean meats. Monitor blood pressure at home, keep a log and bring this with you to your next appointment. Call the office if your blood pressure runs 150/90 or higher consistently.  Blood Pressure Technique:  Sit quietly in a chair for 5 minutes with back supported and feet on the floor  Then place left arm on a table or armrest so bicep area is at the same level of heart or left breast  Check  three times in a row, disregard the highest reading and average the other two    Mixed hyperlipidemia      HYPERLIPIDEMIA:  Your cholesterol level is elevated. Decrease intake of saturated fats, fast food, sweets.  Increase intake of fresh fruit fresh vegetables and lean meats.  Increase healthy fats seeds, nuts, olive oil instead of butter.  walk 150 minutes/week for heart health.   Aim for 25-30 grams of fiber in your diet daily.  May consider adding Fish Oil supplement 1,200 mg per day or Omega 3 Supplement daily.    Please follow up in 6 months and we will recheck.  Please reach out with any questions.

## 2025-06-24 NOTE — CASE MANAGEMENT/SOCIAL WORK
"Physicians Statement of Medical Necessity for  Ambulance Transportation    GENERAL INFORMATION     Name: Sasha Knapp  YOB: 1965    Medicare #: N/A  [facility]  [id number]   Transport Date:   (Valid for round trips this date, or for scheduled repetitive trips for 60 days from the date signed below.)  Origin: [facility]    Destination: [address], [address]  Is the Patient's stay covered under Medicare Part A (PPS/DRG?)No   Closest appropriate facility? Yes  If this a hosp-hosp transfer? No  Is this a hospice patient? No    MEDICAL NECESSITY QUESTIONAIRE    Ambulance Transportation is medically necessary only if other means of transportation are contraindicated or would be potentially harmful to the patient.  To meet this requirement, the patient must be either \"bed confined\" or suffer from a condition such that transport by means other than an ambulance is contraindicated by the patient's condition.  The following questions must be answered by the healthcare professional signing below for this form to be valid:     1) Describe the MEDICAL CONDITION (physical and/or mental) of this patient AT THE TIME OF AMBULANCE TRANSPORT that requires the patient to be transported in an ambulance, and why transport by other means is contraindicated by the patient's condition:     Acute kidney injury   Left femur fracture from mechanical fall  History of bilateral knee replacements    Past Medical History:   Diagnosis Date    Diabetes mellitus     Hyperlipidemia     Hypertension     Sleep apnea       Past Surgical History:   Procedure Laterality Date    CARPAL TUNNEL RELEASE Left     HYSTERECTOMY  2000    REPLACEMENT TOTAL KNEE BILATERAL Bilateral       2) Is this patient \"bed confined\" as defined below?No    To be \"bed confined\" the patient must satisfy all three of the following criteria:  (1) unable to get up from bed without assistance; AND (2) unable to " ambulate;  AND (3) unable to sit in a chair or wheelchair.  3) Can this patient safely be transported by car or wheelchair van (I.e., may safely sit during transport, without an attendant or monitoring?)No   4. In addition to completing questions 1-3 above, please check any of the following conditions that apply*:          *Note: supporting documentation for any boxes checked must be maintained in the patient's medical records Requires oxygen - unable to self administer and Unable to tolerate seated position for time needed to transport      SIGNATURE OF PHYSICIAN OR OTHER AUTHORIZED HEALTHCARE PROFESSIONAL    I certify that the above information is true and correct based on my evaluation of this patient, and represent that the patient requires transport by ambulance and that other forms of transport are contraindicated.  I understand that this information will be used by the Centers for Medicare and Medicaid Services (CMS) to support the determiniation of medical necessity for ambulance services, and I represent that I have personal knowledge of the patient's condition at the time of transport.       If this box is checked, I also certify that the patient is physically or mentally incapable of signing the ambulance service's claim form and that the institution with which I am affiliated has furnished care, services or assistance to the patient.  My signature below is made on behalf of the patient pursuant to 42 .36(b)(4). In accordance with 42 .37, the specific reason(s) that the patient is physically or mentally incapable of signing the claim for is as follows:     Signature of Physician or Healthcare Professional  Date/Time:        (For Scheduled repetitive transport, this form is not valid for transports performed more than 60 days after this date).                                                                                                                                             --------------------------------------------------------------------------------------------  Printed Name and Credentials of Physician or Authorized Healthcare Professional     *Form must be signed by patient's attending physician for scheduled, repetitive transports,.  For non-repetitive ambulance transports, if unable to obtain the signature of the attending physician, any of the following may sign (please select below):     Physician  Clinical Nurse Specialist  Registered Nurse     Physician Assistant  Discharge Planner  Licensed Practical Nurse     Nurse Practitioner           now nonverbal, LKW 3am. code stroke called per ems prenotification